# Patient Record
Sex: MALE | Race: WHITE | Employment: OTHER | URBAN - METROPOLITAN AREA
[De-identification: names, ages, dates, MRNs, and addresses within clinical notes are randomized per-mention and may not be internally consistent; named-entity substitution may affect disease eponyms.]

---

## 2022-06-25 ENCOUNTER — HOSPITAL ENCOUNTER (EMERGENCY)
Age: 77
Discharge: HOME | End: 2022-06-25
Attending: SPECIALIST
Payer: COMMERCIAL

## 2022-06-25 VITALS
RESPIRATION RATE: 18 BRPM | DIASTOLIC BLOOD PRESSURE: 66 MMHG | WEIGHT: 160 LBS | BODY MASS INDEX: 23.7 KG/M2 | OXYGEN SATURATION: 97 % | SYSTOLIC BLOOD PRESSURE: 151 MMHG | HEIGHT: 69 IN | HEART RATE: 61 BPM | TEMPERATURE: 98.1 F

## 2022-06-25 DIAGNOSIS — H92.01 RIGHT EAR PAIN: ICD-10-CM

## 2022-06-25 DIAGNOSIS — K08.89 DENTALGIA: Primary | ICD-10-CM

## 2022-06-25 PROCEDURE — 99283 EMERGENCY DEPT VISIT LOW MDM: CPT

## 2022-06-25 RX ORDER — IBUPROFEN 600 MG/1
600 TABLET ORAL EVERY 6 HOURS PRN
Qty: 20 TABLET | Refills: 0 | Status: ON HOLD | OUTPATIENT
Start: 2022-06-25 | End: 2022-07-30 | Stop reason: HOSPADM

## 2022-06-25 RX ORDER — OMEPRAZOLE 20 MG/1
40 CAPSULE, DELAYED RELEASE ORAL DAILY
COMMUNITY

## 2022-06-25 RX ORDER — CHLORHEXIDINE GLUCONATE 4 G/100ML
SOLUTION TOPICAL DAILY PRN
Status: ON HOLD | COMMUNITY
End: 2022-08-19 | Stop reason: HOSPADM

## 2022-06-25 RX ORDER — METOPROLOL SUCCINATE 25 MG/1
25 TABLET, EXTENDED RELEASE ORAL DAILY
Status: ON HOLD | COMMUNITY
End: 2022-07-22 | Stop reason: HOSPADM

## 2022-06-25 RX ORDER — FUROSEMIDE 40 MG/1
40 TABLET ORAL 2 TIMES DAILY
Status: ON HOLD | COMMUNITY
End: 2022-07-22 | Stop reason: HOSPADM

## 2022-06-25 RX ORDER — AMOXICILLIN 500 MG/1
500 CAPSULE ORAL 3 TIMES DAILY
Qty: 30 CAPSULE | Refills: 0 | Status: SHIPPED | OUTPATIENT
Start: 2022-06-25 | End: 2022-07-05

## 2022-06-25 RX ORDER — ALBUTEROL SULFATE 90 UG/1
2 AEROSOL, METERED RESPIRATORY (INHALATION) EVERY 6 HOURS PRN
COMMUNITY

## 2022-06-25 RX ORDER — ACETAMINOPHEN 325 MG/1
650 TABLET ORAL EVERY 6 HOURS PRN
COMMUNITY
End: 2022-09-22

## 2022-06-25 RX ORDER — NITROGLYCERIN 0.4 MG/1
0.4 TABLET SUBLINGUAL EVERY 5 MIN PRN
COMMUNITY

## 2022-06-25 ASSESSMENT — PAIN - FUNCTIONAL ASSESSMENT: PAIN_FUNCTIONAL_ASSESSMENT: 0-10

## 2022-06-25 ASSESSMENT — PAIN DESCRIPTION - LOCATION
LOCATION: FACE
LOCATION: FACE

## 2022-06-25 ASSESSMENT — PAIN DESCRIPTION - ORIENTATION
ORIENTATION: RIGHT
ORIENTATION: RIGHT

## 2022-06-25 ASSESSMENT — PAIN SCALES - GENERAL: PAINLEVEL_OUTOF10: 6

## 2022-06-25 NOTE — ED PROVIDER NOTES
Giuseppe Irizarry 1778 ENCOUNTER      Pt Name: David Phillip  MRN: [id number]  [place] 1945  Date of evaluation: 6/25/22      CHIEF COMPLAINT       Chief Complaint   Patient presents with    Facial Pain     started two days ago, right side          HISTORY OF PRESENT ILLNESS    David Phillip is a 68 y.o. male who presents to the emergency department brought in via EMS for evaluation of right facial pain off and on for last 2 days. The pain is localized mostly around the right ear and radiates towards the right side of the neck. Patient has been taking Tylenol only twice a day 650 mg each time with some relief. The last dose of Tylenol was yesterday morning. Patient has had right upper jaw root canal performed more than 2 weeks ago and was prescribed Keflex which she took for 4 to 5 days. He had leftover amoxicillin and took 1 dose 1 hour prior to arrival.  Pain is worse with chewing. He denies any fever or chills and denies noticing any swelling or discharge from the right upper jaw or gum area. He denies any headache, visual disturbances, neck pain, neck stiffness, chest pain, shortness of breath, palpitations or diaphoresis. He also denies any tingling, numbness or weakness in any of the extremities. REVIEW OF SYSTEMS       Review of Systems    All systems reviewed and negative unless noted in HPI. The patient denies fever or constitutional symptoms. Denies vision change. Denies any sore throat or rhinorrhea. Complains of right sided facial pain radiating from right ear towards the right side of the neck  Denies chest pain or shortness of breath. No nausea,  vomiting or diarrhea. Denies any dysuria. Denies urinary frequency or hematuria. Denies musculoskeletal injury or pain. Denies any weakness, numbness or focal neurologic deficit. Denies any skin rash or edema. No easy bruising or bleeding.    Denies any polyuria, polydypsia       PAST MEDICAL HISTORY    has a past medical history of Hyperlipidemia, Hypertension, MI (myocardial infarction) ([facility] [address] and MS (multiple sclerosis) ([facility] [address] SURGICAL HISTORY      has a past surgical history that includes pacemaker placement and Coronary angioplasty with stent. CURRENT MEDICATIONS       Discharge Medication List as of 6/25/2022  3:23 AM      CONTINUE these medications which have NOT CHANGED    Details   metoprolol succinate (TOPROL XL) 25 MG extended release tablet Take 25 mg by mouth daily 1/2 tabHistorical Med      furosemide (LASIX) 40 MG tablet Take 40 mg by mouth 2 times dailyHistorical Med      apixaban (ELIQUIS) 5 MG TABS tablet Take by mouth 2 times dailyHistorical Med      albuterol sulfate HFA (VENTOLIN HFA) 108 (90 Base) MCG/ACT inhaler Inhale 2 puffs into the lungs every 6 hours as needed for WheezingHistorical Med      Rosuvastatin Calcium 20 MG CPSP Take by mouth DailyHistorical Med      nitroGLYCERIN (NITROSTAT) 0.4 MG SL tablet Place 0.4 mg under the tongue every 5 minutes as needed for Chest pain up to max of 3 total doses. If no relief after 1 dose, call 911. Historical Med      chlorhexidine (HIBICLENS) 4 % external liquid Apply topically daily as needed Apply topically daily as needed., Topical, DAILY PRN, Historical Med      acetaminophen (TYLENOL) 325 MG tablet Take 650 mg by mouth every 6 hours as needed for PainHistorical Med      omeprazole (PRILOSEC) 20 MG delayed release capsule Take 20 mg by mouth DailyHistorical Med      LISINOPRIL PO Take 5 mg by mouth Daily Historical Med             ALLERGIES     is allergic to codeine, doxycycline, erythromycin, gammagard [immune globulin], and sulfa antibiotics. FAMILY HISTORY     has no family status information on file. family history is not on file. SOCIAL HISTORY      reports that he has quit smoking. He has never used smokeless tobacco. He reports that he does not drink alcohol and does not use drugs.     PHYSICAL EXAM INITIAL VITALS:  height is 5' 9\" (1.753 m) and weight is 72.6 kg (160 lb). His oral temperature is 98.1 °F (36.7 °C). His blood pressure is 151/66 (abnormal) and his pulse is 61. His respiration is 18 and oxygen saturation is 97%. Physical Exam  Vitals and nursing note reviewed. Constitutional:       Appearance: He is well-developed. HENT:      Head: Normocephalic and atraumatic. Jaw: No trismus. Right Ear: Tympanic membrane and ear canal normal.      Left Ear: Tympanic membrane and ear canal normal.      Nose: Nose normal.      Mouth/Throat:      Dentition: Dental tenderness present. No gingival swelling or dental abscesses. Eyes:      Extraocular Movements: Extraocular movements intact. Pupils: Pupils are equal, round, and reactive to light. Cardiovascular:      Rate and Rhythm: Normal rate and regular rhythm. Heart sounds: Normal heart sounds. No murmur heard. Pulmonary:      Effort: Pulmonary effort is normal. No respiratory distress. Breath sounds: Normal breath sounds. Abdominal:      General: Bowel sounds are normal. There is no distension. Palpations: Abdomen is soft. Tenderness: There is no abdominal tenderness. Musculoskeletal:      Cervical back: Normal range of motion and neck supple. Skin:     General: Skin is warm and dry. Neurological:      General: No focal deficit present. Mental Status: He is alert and oriented to person, place, and time. DIFFERENTIAL DIAGNOSIS/ MDM:     Right otalgia, tooth ache with no evidence of tooth abscess. DIAGNOSTIC RESULTS     EKG: All EKG's are interpreted by the Emergency Department Physician who either signs or Co-signs this chart in the absence of a cardiologist.    None obtained    RADIOLOGY:   Interpretation per the Radiologist below, if available at the time of this note:    No results found. LABS:  No results found for this visit on 06/25/22.       EMERGENCY DEPARTMENT COURSE: Vitals:    Vitals:    06/25/22 0251   BP: (!) 151/66   Pulse: 61   Resp: 18   Temp: 98.1 °F (36.7 °C)   TempSrc: Oral   SpO2: 97%   Weight: 72.6 kg (160 lb)   Height: 5' 9\" (1.753 m)     -------------------------  BP: (!) 151/66, Temp: 98.1 °F (36.7 °C), Heart Rate: 61, Resp: 18    Orders Placed This Encounter   Medications    amoxicillin (AMOXIL) 500 MG capsule     Sig: Take 1 capsule by mouth 3 times daily for 10 days     Dispense:  30 capsule     Refill:  0    ibuprofen (IBU) 600 MG tablet     Sig: Take 1 tablet by mouth every 6 hours as needed for Pain     Dispense:  20 tablet     Refill:  0    neomycin-polymyxin-hydrocortisone (CORTISPORIN) 3.5-34191-0 otic solution     Sig: Place 4 drops into the right ear 3 times daily for 10 days Instill into Right Ear     Dispense:  10 mL     Refill:  0         During the emergency department course, patient is resting comfortably and has remained pain-free. He is concerned about infection at the site of the root canal.  He was prescribed amoxicillin for 10 days course and advised to continue Tylenol as needed. He was also prescribed ibuprofen to take in addition to Tylenol if needed and prescribed Cortisporin otic solution for the right ear pain. He is advised to drink plenty of oral fluids, follow-up with his PCP and dentist, return if worse. The patient  understands that at this time there is no evidence for a more malignant underlying process, but also understands that early in the process of an illness or injury, an emergency department workup can be falsely reassuring. Routine discharge counseling was given, and it is understood that worsening, changing or persistent symptoms should prompt an immediate call or follow up with their primary physician or return to the emergency department. The importance of appropriate follow up was also discussed. I have reviewed the disposition diagnosis. I have answered the questions and given discharge instructions. There was voiced understanding of these instructions and no further questions or complaints. CONSULTS:  None    PROCEDURES:  None    FINAL IMPRESSION      1. Dentalgia    2. Right ear pain          DISPOSITION/PLAN       PATIENT REFERRED TO:  Lalo Chan MD  [address] [address] [facility] [address]  [address]  [phone]    Call in 3 days  For reevaluation of current symptoms    [facility] ED  [address] [facility] [address] [phone]    If symptoms worsen      DISCHARGE MEDICATIONS:  Discharge Medication List as of 6/25/2022  3:23 AM      START taking these medications    Details   amoxicillin (AMOXIL) 500 MG capsule Take 1 capsule by mouth 3 times daily for 10 days, Disp-30 capsule, R-0Normal      ibuprofen (IBU) 600 MG tablet Take 1 tablet by mouth every 6 hours as needed for Pain, Disp-20 tablet, R-0Normal      neomycin-polymyxin-hydrocortisone (CORTISPORIN) 3.5-11456-7 otic solution Place 4 drops into the right ear 3 times daily for 10 days Instill into Right Ear, Disp-10 mL, R-0Normal             (Please note that portions of this note were completed with a voice recognition program.  Efforts were made to edit the dictations but occasionally words are mis-transcribed.)    Sarah Bryant MD,, MD, F.A.C.E.P.   Attending Emergency Medicine Physician     Sarah Bryant MD  06/25/22 7397

## 2022-06-25 NOTE — ED NOTES
Pt to ER per EMS, transferred to bed, full assist. Pt reports right side face pain, that starts in ear and radiates to jaw area. Pt reports pain started two days ago, reports pain currently 6/10, denies analgesics PTA, but does report taking an extra amoxicillin dose. Pt also reports root canal about two weeks ago, unsure if related.  Pt calm, cooperative, respers even non labored, skin warm pink, no distress, here for eval.      Julio Huynh RN  06/25/22 0453

## 2022-06-25 NOTE — ED NOTES
Pt reports son is on his way to provide transport for pt to home.       Silvestre Sin RN  06/25/22 4933

## 2022-07-21 ENCOUNTER — APPOINTMENT (OUTPATIENT)
Dept: GENERAL RADIOLOGY | Age: 77
End: 2022-07-21
Payer: COMMERCIAL

## 2022-07-21 ENCOUNTER — APPOINTMENT (OUTPATIENT)
Dept: CT IMAGING | Age: 77
End: 2022-07-21
Payer: COMMERCIAL

## 2022-07-21 ENCOUNTER — HOSPITAL ENCOUNTER (OUTPATIENT)
Age: 77
Setting detail: OBSERVATION
Discharge: HOME | End: 2022-07-22
Attending: EMERGENCY MEDICINE | Admitting: HOSPITALIST
Payer: COMMERCIAL

## 2022-07-21 DIAGNOSIS — R07.9 CHEST PAIN, UNSPECIFIED TYPE: Primary | ICD-10-CM

## 2022-07-21 PROBLEM — N18.31 STAGE 3A CHRONIC KIDNEY DISEASE (HCC): Status: ACTIVE | Noted: 2022-07-21

## 2022-07-21 PROBLEM — I25.5 ISCHEMIC CARDIOMYOPATHY: Status: ACTIVE | Noted: 2022-07-21

## 2022-07-21 PROBLEM — I95.9 HYPOTENSION: Status: ACTIVE | Noted: 2022-07-21

## 2022-07-21 PROBLEM — I25.10 CORONARY ARTERY DISEASE INVOLVING NATIVE CORONARY ARTERY OF NATIVE HEART: Status: ACTIVE | Noted: 2022-07-21

## 2022-07-21 PROBLEM — I50.40 COMBINED CONGESTIVE SYSTOLIC AND DIASTOLIC HEART FAILURE (HCC): Status: ACTIVE | Noted: 2022-07-21

## 2022-07-21 PROBLEM — I48.20 CHRONIC ATRIAL FIBRILLATION (HCC): Status: ACTIVE | Noted: 2022-07-21

## 2022-07-21 LAB
ABSOLUTE EOS #: 0.1 K/UL (ref 0–0.4)
ABSOLUTE LYMPH #: 1.8 K/UL (ref 1–4.8)
ABSOLUTE MONO #: 1.4 K/UL (ref 0.1–1.2)
ALBUMIN SERPL-MCNC: 3.8 G/DL (ref 3.5–5.2)
ALBUMIN/GLOBULIN RATIO: 1.4 (ref 1–2.5)
ALP BLD-CCNC: 67 U/L (ref 40–129)
ALT SERPL-CCNC: 12 U/L (ref 5–41)
ANION GAP SERPL CALCULATED.3IONS-SCNC: 12 MMOL/L (ref 9–17)
AST SERPL-CCNC: 16 U/L
BASOPHILS # BLD: 0 % (ref 0–2)
BASOPHILS ABSOLUTE: 0 K/UL (ref 0–0.2)
BILIRUB SERPL-MCNC: 0.38 MG/DL (ref 0.3–1.2)
BUN BLDV-MCNC: 34 MG/DL (ref 8–23)
CALCIUM SERPL-MCNC: 8.8 MG/DL (ref 8.6–10.4)
CHLORIDE BLD-SCNC: 105 MMOL/L (ref 98–107)
CO2: 22 MMOL/L (ref 20–31)
CREAT SERPL-MCNC: 1.35 MG/DL (ref 0.7–1.2)
EOSINOPHILS RELATIVE PERCENT: 1 % (ref 1–4)
GFR AFRICAN AMERICAN: >60 ML/MIN
GFR NON-AFRICAN AMERICAN: 51 ML/MIN
GFR SERPL CREATININE-BSD FRML MDRD: ABNORMAL ML/MIN/{1.73_M2}
GLUCOSE BLD-MCNC: 131 MG/DL (ref 70–99)
HCT VFR BLD CALC: 38.1 % (ref 41–53)
HEMOGLOBIN: 12.3 G/DL (ref 13.5–17.5)
INR BLD: 1.2
LYMPHOCYTES # BLD: 16 % (ref 24–44)
MCH RBC QN AUTO: 28.3 PG (ref 26–34)
MCHC RBC AUTO-ENTMCNC: 32.4 G/DL (ref 31–37)
MCV RBC AUTO: 87.4 FL (ref 80–100)
MONOCYTES # BLD: 12 % (ref 2–11)
PARTIAL THROMBOPLASTIN TIME: 24.8 SEC (ref 21.3–31.3)
PDW BLD-RTO: 14.1 % (ref 12.5–15.4)
PLATELET # BLD: 243 K/UL (ref 140–450)
PMV BLD AUTO: 7.7 FL (ref 6–12)
POTASSIUM SERPL-SCNC: 4.3 MMOL/L (ref 3.7–5.3)
PRO-BNP: 1330 PG/ML
PROTHROMBIN TIME: 12.7 SEC (ref 9.4–12.6)
RBC # BLD: 4.36 M/UL (ref 4.5–5.9)
REASON FOR REJECTION: NORMAL
SEG NEUTROPHILS: 71 % (ref 36–66)
SEGMENTED NEUTROPHILS ABSOLUTE COUNT: 7.9 K/UL (ref 1.8–7.7)
SODIUM BLD-SCNC: 139 MMOL/L (ref 135–144)
TOTAL PROTEIN: 6.5 G/DL (ref 6.4–8.3)
TROPONIN, HIGH SENSITIVITY: 34 NG/L (ref 0–22)
TROPONIN, HIGH SENSITIVITY: 37 NG/L (ref 0–22)
WBC # BLD: 11.1 K/UL (ref 3.5–11)
ZZ NTE CLEAN UP: ORDERED TEST: NORMAL
ZZ NTE WITH NAME CLEAN UP: SPECIMEN SOURCE: NORMAL

## 2022-07-21 PROCEDURE — 80053 COMPREHEN METABOLIC PANEL: CPT

## 2022-07-21 PROCEDURE — 83880 ASSAY OF NATRIURETIC PEPTIDE: CPT

## 2022-07-21 PROCEDURE — 74175 CTA ABDOMEN W/CONTRAST: CPT

## 2022-07-21 PROCEDURE — 85610 PROTHROMBIN TIME: CPT

## 2022-07-21 PROCEDURE — G0378 HOSPITAL OBSERVATION PER HR: HCPCS

## 2022-07-21 PROCEDURE — 99285 EMERGENCY DEPT VISIT HI MDM: CPT

## 2022-07-21 PROCEDURE — 74174 CTA ABD&PLVS W/CONTRAST: CPT

## 2022-07-21 PROCEDURE — 99220 PR INITIAL OBSERVATION CARE/DAY 70 MINUTES: CPT | Performed by: HOSPITALIST

## 2022-07-21 PROCEDURE — 6360000002 HC RX W HCPCS: Performed by: EMERGENCY MEDICINE

## 2022-07-21 PROCEDURE — 2580000003 HC RX 258: Performed by: HOSPITALIST

## 2022-07-21 PROCEDURE — 96374 THER/PROPH/DIAG INJ IV PUSH: CPT

## 2022-07-21 PROCEDURE — 96361 HYDRATE IV INFUSION ADD-ON: CPT

## 2022-07-21 PROCEDURE — 96376 TX/PRO/DX INJ SAME DRUG ADON: CPT

## 2022-07-21 PROCEDURE — 71045 X-RAY EXAM CHEST 1 VIEW: CPT

## 2022-07-21 PROCEDURE — 84484 ASSAY OF TROPONIN QUANT: CPT

## 2022-07-21 PROCEDURE — 85025 COMPLETE CBC W/AUTO DIFF WBC: CPT

## 2022-07-21 PROCEDURE — 96375 TX/PRO/DX INJ NEW DRUG ADDON: CPT

## 2022-07-21 PROCEDURE — 93005 ELECTROCARDIOGRAM TRACING: CPT | Performed by: HOSPITALIST

## 2022-07-21 PROCEDURE — 85730 THROMBOPLASTIN TIME PARTIAL: CPT

## 2022-07-21 PROCEDURE — 36415 COLL VENOUS BLD VENIPUNCTURE: CPT

## 2022-07-21 PROCEDURE — 6370000000 HC RX 637 (ALT 250 FOR IP): Performed by: HOSPITALIST

## 2022-07-21 PROCEDURE — 2580000003 HC RX 258: Performed by: EMERGENCY MEDICINE

## 2022-07-21 PROCEDURE — 6360000004 HC RX CONTRAST MEDICATION: Performed by: EMERGENCY MEDICINE

## 2022-07-21 RX ORDER — ACETAMINOPHEN 325 MG/1
650 TABLET ORAL EVERY 6 HOURS PRN
Status: DISCONTINUED | OUTPATIENT
Start: 2022-07-21 | End: 2022-07-21 | Stop reason: SDUPTHER

## 2022-07-21 RX ORDER — SODIUM CHLORIDE 0.9 % (FLUSH) 0.9 %
5-40 SYRINGE (ML) INJECTION EVERY 12 HOURS SCHEDULED
Status: DISCONTINUED | OUTPATIENT
Start: 2022-07-21 | End: 2022-07-22 | Stop reason: HOSPADM

## 2022-07-21 RX ORDER — POLYETHYLENE GLYCOL 3350 17 G/17G
17 POWDER, FOR SOLUTION ORAL DAILY PRN
Status: DISCONTINUED | OUTPATIENT
Start: 2022-07-21 | End: 2022-07-22 | Stop reason: HOSPADM

## 2022-07-21 RX ORDER — ONDANSETRON 2 MG/ML
4 INJECTION INTRAMUSCULAR; INTRAVENOUS ONCE
Status: COMPLETED | OUTPATIENT
Start: 2022-07-21 | End: 2022-07-21

## 2022-07-21 RX ORDER — MORPHINE SULFATE 2 MG/ML
2 INJECTION, SOLUTION INTRAMUSCULAR; INTRAVENOUS ONCE
Status: COMPLETED | OUTPATIENT
Start: 2022-07-21 | End: 2022-07-21

## 2022-07-21 RX ORDER — 0.9 % SODIUM CHLORIDE 0.9 %
1000 INTRAVENOUS SOLUTION INTRAVENOUS ONCE
Status: COMPLETED | OUTPATIENT
Start: 2022-07-21 | End: 2022-07-21

## 2022-07-21 RX ORDER — NITROGLYCERIN 0.4 MG/1
0.4 TABLET SUBLINGUAL EVERY 5 MIN PRN
Status: DISCONTINUED | OUTPATIENT
Start: 2022-07-21 | End: 2022-07-22 | Stop reason: HOSPADM

## 2022-07-21 RX ORDER — ACETAMINOPHEN 650 MG/1
650 SUPPOSITORY RECTAL EVERY 6 HOURS PRN
Status: DISCONTINUED | OUTPATIENT
Start: 2022-07-21 | End: 2022-07-22 | Stop reason: HOSPADM

## 2022-07-21 RX ORDER — SODIUM CHLORIDE 9 MG/ML
INJECTION, SOLUTION INTRAVENOUS PRN
Status: DISCONTINUED | OUTPATIENT
Start: 2022-07-21 | End: 2022-07-22 | Stop reason: HOSPADM

## 2022-07-21 RX ORDER — ACETAMINOPHEN 325 MG/1
650 TABLET ORAL EVERY 6 HOURS PRN
Status: DISCONTINUED | OUTPATIENT
Start: 2022-07-21 | End: 2022-07-22 | Stop reason: HOSPADM

## 2022-07-21 RX ORDER — 0.9 % SODIUM CHLORIDE 0.9 %
80 INTRAVENOUS SOLUTION INTRAVENOUS ONCE
Status: DISCONTINUED | OUTPATIENT
Start: 2022-07-21 | End: 2022-07-22

## 2022-07-21 RX ORDER — POTASSIUM CHLORIDE 20 MEQ/1
40 TABLET, EXTENDED RELEASE ORAL PRN
Status: DISCONTINUED | OUTPATIENT
Start: 2022-07-21 | End: 2022-07-22 | Stop reason: HOSPADM

## 2022-07-21 RX ORDER — ALBUTEROL SULFATE 90 UG/1
2 AEROSOL, METERED RESPIRATORY (INHALATION) EVERY 6 HOURS PRN
Status: DISCONTINUED | OUTPATIENT
Start: 2022-07-21 | End: 2022-07-22 | Stop reason: HOSPADM

## 2022-07-21 RX ORDER — SODIUM CHLORIDE 0.9 % (FLUSH) 0.9 %
10 SYRINGE (ML) INJECTION PRN
Status: DISCONTINUED | OUTPATIENT
Start: 2022-07-21 | End: 2022-07-22 | Stop reason: HOSPADM

## 2022-07-21 RX ORDER — PANTOPRAZOLE SODIUM 40 MG/1
40 TABLET, DELAYED RELEASE ORAL
Status: DISCONTINUED | OUTPATIENT
Start: 2022-07-22 | End: 2022-07-22 | Stop reason: HOSPADM

## 2022-07-21 RX ORDER — MAGNESIUM SULFATE 1 G/100ML
1000 INJECTION INTRAVENOUS PRN
Status: DISCONTINUED | OUTPATIENT
Start: 2022-07-21 | End: 2022-07-22 | Stop reason: HOSPADM

## 2022-07-21 RX ORDER — ROSUVASTATIN CALCIUM 20 MG/1
20 TABLET, COATED ORAL DAILY
Status: DISCONTINUED | OUTPATIENT
Start: 2022-07-21 | End: 2022-07-22 | Stop reason: HOSPADM

## 2022-07-21 RX ORDER — POTASSIUM CHLORIDE 7.45 MG/ML
10 INJECTION INTRAVENOUS PRN
Status: DISCONTINUED | OUTPATIENT
Start: 2022-07-21 | End: 2022-07-22 | Stop reason: HOSPADM

## 2022-07-21 RX ORDER — ONDANSETRON 4 MG/1
4 TABLET, ORALLY DISINTEGRATING ORAL EVERY 8 HOURS PRN
Status: DISCONTINUED | OUTPATIENT
Start: 2022-07-21 | End: 2022-07-22 | Stop reason: HOSPADM

## 2022-07-21 RX ORDER — SODIUM CHLORIDE 9 MG/ML
INJECTION, SOLUTION INTRAVENOUS CONTINUOUS
Status: DISCONTINUED | OUTPATIENT
Start: 2022-07-21 | End: 2022-07-22

## 2022-07-21 RX ORDER — ONDANSETRON 2 MG/ML
4 INJECTION INTRAMUSCULAR; INTRAVENOUS EVERY 6 HOURS PRN
Status: DISCONTINUED | OUTPATIENT
Start: 2022-07-21 | End: 2022-07-22 | Stop reason: HOSPADM

## 2022-07-21 RX ADMIN — MORPHINE SULFATE 2 MG: 2 INJECTION, SOLUTION INTRAMUSCULAR; INTRAVENOUS at 13:09

## 2022-07-21 RX ADMIN — ONDANSETRON 4 MG: 2 INJECTION INTRAMUSCULAR; INTRAVENOUS at 13:09

## 2022-07-21 RX ADMIN — SODIUM CHLORIDE 1000 ML: 9 INJECTION, SOLUTION INTRAVENOUS at 12:29

## 2022-07-21 RX ADMIN — SODIUM CHLORIDE, PRESERVATIVE FREE 10 ML: 5 INJECTION INTRAVENOUS at 21:18

## 2022-07-21 RX ADMIN — IOPAMIDOL 100 ML: 755 INJECTION, SOLUTION INTRAVENOUS at 13:48

## 2022-07-21 RX ADMIN — SODIUM CHLORIDE 1000 ML: 9 INJECTION, SOLUTION INTRAVENOUS at 13:17

## 2022-07-21 RX ADMIN — SODIUM CHLORIDE: 9 INJECTION, SOLUTION INTRAVENOUS at 18:50

## 2022-07-21 RX ADMIN — ACETAMINOPHEN 650 MG: 325 TABLET ORAL at 23:29

## 2022-07-21 RX ADMIN — APIXABAN 5 MG: 5 TABLET, FILM COATED ORAL at 21:12

## 2022-07-21 RX ADMIN — MORPHINE SULFATE 2 MG: 2 INJECTION, SOLUTION INTRAMUSCULAR; INTRAVENOUS at 12:50

## 2022-07-21 RX ADMIN — ONDANSETRON 4 MG: 2 INJECTION INTRAMUSCULAR; INTRAVENOUS at 12:50

## 2022-07-21 RX ADMIN — Medication 80 ML: at 13:49

## 2022-07-21 RX ADMIN — ROSUVASTATIN 20 MG: 20 TABLET, FILM COATED ORAL at 19:39

## 2022-07-21 RX ADMIN — SODIUM CHLORIDE, PRESERVATIVE FREE 10 ML: 5 INJECTION INTRAVENOUS at 13:49

## 2022-07-21 ASSESSMENT — PAIN SCALES - GENERAL
PAINLEVEL_OUTOF10: 4
PAINLEVEL_OUTOF10: 10
PAINLEVEL_OUTOF10: 10
PAINLEVEL_OUTOF10: 3
PAINLEVEL_OUTOF10: 10

## 2022-07-21 ASSESSMENT — PAIN - FUNCTIONAL ASSESSMENT: PAIN_FUNCTIONAL_ASSESSMENT: 0-10

## 2022-07-21 ASSESSMENT — PAIN DESCRIPTION - LOCATION
LOCATION: CHEST
LOCATION: CHEST

## 2022-07-21 ASSESSMENT — PAIN DESCRIPTION - PAIN TYPE: TYPE: ACUTE PAIN

## 2022-07-21 ASSESSMENT — PAIN DESCRIPTION - DESCRIPTORS: DESCRIPTORS: SORE

## 2022-07-21 ASSESSMENT — HEART SCORE: ECG: 1

## 2022-07-21 NOTE — ED NOTES
EMS call stating they were in route to [place] with a 68year old male having chest pain. One spray of Nitro and four baby aspirin were given. EMS stated after the nitro spray, patient went unresponsive and they had to begin bagging patient. EMS stated they need help outside to transport patient into the hospital. Upon arrival to hospital, RN went out to help, patient started to become more responsive. Patient was hypotensive and diaphoretic upon arrival into ED room.      Abdi Smith RN  07/21/22 0348

## 2022-07-21 NOTE — PLAN OF CARE
Problem: Discharge Planning  Goal: Discharge to home or other facility with appropriate resources  Outcome: Progressing  Flowsheets (Taken 7/21/2022 1831)  Discharge to home or other facility with appropriate resources:   Identify barriers to discharge with patient and caregiver   Arrange for needed discharge resources and transportation as appropriate   Identify discharge learning needs (meds, wound care, etc)     Problem: Pain  Goal: Verbalizes/displays adequate comfort level or baseline comfort level  Outcome: Progressing  Flowsheets (Taken 7/21/2022 1833)  Verbalizes/displays adequate comfort level or baseline comfort level:   Encourage patient to monitor pain and request assistance   Assess pain using appropriate pain scale   Administer analgesics based on type and severity of pain and evaluate response   Implement non-pharmacological measures as appropriate and evaluate response   Consider cultural and social influences on pain and pain management     Problem: ABCDS Injury Assessment  Goal: Absence of physical injury  Outcome: Progressing     Problem: Safety - Adult  Goal: Free from fall injury  Outcome: Progressing

## 2022-07-21 NOTE — ED NOTES
Dr. Memo Andrews gave verbal order for blood pressure in both arms. Right arm: 74/45  Left arm: 81/41  RN gave readings to Dr. Memo Andrews, no orders received.      Thalia Bills RN  07/21/22 7433

## 2022-07-21 NOTE — ED PROVIDER NOTES
[facility] ED  EMERGENCY DEPARTMENT ENCOUNTER      Pt Name: Rosa Deleon  MRN: [id number]  [place] 1945  Date of evaluation: 7/21/2022  Provider: Chuck Najera MD    CHIEF COMPLAINT     Chief Complaint   Patient presents with    Chest Pain         HISTORY OF PRESENT ILLNESS   (Location/Symptom, Timing/Onset, Context/Setting,Quality, Duration, Modifying Factors, Severity)  Note limiting factors. Rosa Deleon is a 68 y.o. male who presents to the emergency department by EMS with a history of chest pain that he has been experiencing since last night. Patient does have a significant heart history of previous myocardial infarction with PCI and stent placement done at the [facility] in [place]. When paramedics showed up he was in his garage. They started an IV and give him chewable aspirin as well as 1 sublingual nitroglycerin tablet. His blood-pressure started dropping and he became briefly unresponsive. He did not require CPR and did not require to be bagged and on the way here he started waking up and becoming more responsive. Patient continues to complain of chest pain. He is profoundly hypotensive upon arrival but is responding to IV fluids. The history is provided by the patient, medical records and the EMS personnel. Nursing Notes werereviewed. REVIEW OF SYSTEMS    (2-9 systems for level 4, 10 or more for level 5)     Review of Systems   Unable to perform ROS: Acuity of condition     Except as noted above the remainder of the review of systems was reviewed and negative.        PAST MEDICAL HISTORY     Past Medical History:   Diagnosis Date    Hyperlipidemia     Hypertension     MI (myocardial infarction) ([facility] [address]     MS (multiple sclerosis) ([facility] [address]          SURGICALHISTORY       Past Surgical History:   Procedure Laterality Date    CORONARY ANGIOPLASTY WITH STENT PLACEMENT      PACEMAKER PLACEMENT           CURRENT MEDICATIONS       Previous Medications    ACETAMINOPHEN (TYLENOL) 325 MG TABLET    Take 650 mg by mouth every 6 hours as needed for Pain    ALBUTEROL SULFATE HFA (VENTOLIN HFA) 108 (90 BASE) MCG/ACT INHALER    Inhale 2 puffs into the lungs every 6 hours as needed for Wheezing    APIXABAN (ELIQUIS) 5 MG TABS TABLET    Take by mouth 2 times daily    CHLORHEXIDINE (HIBICLENS) 4 % EXTERNAL LIQUID    Apply topically daily as needed Apply topically daily as needed. FUROSEMIDE (LASIX) 40 MG TABLET    Take 40 mg by mouth 2 times daily    IBUPROFEN (IBU) 600 MG TABLET    Take 1 tablet by mouth every 6 hours as needed for Pain    LISINOPRIL PO    Take 5 mg by mouth Daily     METOPROLOL SUCCINATE (TOPROL XL) 25 MG EXTENDED RELEASE TABLET    Take 25 mg by mouth daily 1/2 tab    NITROGLYCERIN (NITROSTAT) 0.4 MG SL TABLET    Place 0.4 mg under the tongue every 5 minutes as needed for Chest pain up to max of 3 total doses. If no relief after 1 dose, call 911. OMEPRAZOLE (PRILOSEC) 20 MG DELAYED RELEASE CAPSULE    Take 20 mg by mouth Daily    ROSUVASTATIN CALCIUM 20 MG CPSP    Take by mouth Daily       ALLERGIES     Codeine, Doxycycline, Erythromycin, Gammagard [immune globulin], and Sulfa antibiotics    FAMILY HISTORY     History reviewed. No pertinent family history. SOCIAL HISTORY       Social History     Socioeconomic History    Marital status:      Spouse name: None    Number of children: None    Years of education: None    Highest education level: None   Tobacco Use    Smoking status: Former    Smokeless tobacco: Never   Substance and Sexual Activity    Alcohol use: No    Drug use: No       SCREENINGS    [place] Coma Scale  Eye Opening: To speech  Best Verbal Response: Oriented  Best Motor Response: Obeys commands  [place] Coma Scale Score: 14 Heart Score for chest pain patients  History:  Moderately Suspicious  ECG: Non-Specifc repolarization disturbance/LBTB/PM  Patient Age: > 65 years  *Risk factors for Atherosclerotic disease: Obesity, Coronary Artery Disease  Risk Factors: > 3 Risk factors or history of atherosclerotic disease*  Troponin: > 1 and < 3X normal limit  Heart Score Total: 7      PHYSICAL EXAM    (up to 7 for level 4, 8 or more for level 5)     ED Triage Vitals   BP Temp Temp src Heart Rate Resp SpO2 Height Weight   07/21/22 1226 -- -- 07/21/22 1230 07/21/22 1230 07/21/22 1230 07/21/22 1230 07/21/22 1230   (!) 72/50   67 18 95 % 5' 9\" (1.753 m) 160 lb (72.6 kg)       Physical Exam  Vitals reviewed. Constitutional:       General: He is in acute distress. Appearance: He is ill-appearing and diaphoretic. HENT:      Head: Normocephalic. Right Ear: External ear normal.      Left Ear: External ear normal.      Nose: Nose normal.      Mouth/Throat:      Mouth: Mucous membranes are moist.   Eyes:      Extraocular Movements: Extraocular movements intact. Pupils: Pupils are equal, round, and reactive to light. Cardiovascular:      Rate and Rhythm: Normal rate and regular rhythm. Heart sounds: Normal heart sounds. Pulmonary:      Effort: Pulmonary effort is normal. No respiratory distress. Breath sounds: Normal breath sounds. Abdominal:      Palpations: Abdomen is soft. Tenderness: There is no abdominal tenderness. Musculoskeletal:         General: No tenderness. Cervical back: Neck supple. Right lower leg: Edema present. Left lower leg: Edema present. Skin:     General: Skin is warm. Coloration: Skin is pale. Skin is not jaundiced. Findings: No rash. Neurological:      Comments: Poorly responsible but responds to voice and tactile stimuli. Opens eyes on command. Speaks in short sentences. Respirations are spontaneous. DIAGNOSTIC RESULTS     EKG: All EKG's are interpreted by the Emergency Department Physician who either signs orCo-signs this chart in the absence of a cardiologist.    Atrial sensed ventricular paced rhythm with rate of 60 beats a minute.     An old EKG from January 2016 shows sinus rhythm, old inferior wall infarct and right bundle branch block pattern. RADIOLOGY:     Interpretation per the Radiologist below, ifavailable at the time of this note:    CTA CHEST ABDOMEN W CONTRAST   Final Result   No evidence of aortic dissection or thoracic aortic aneurysm. Infrarenal   abdominal aortic aneurysm, measuring 3-cm. No acute aortic abnormality. No   central pulmonary embolism. Chronically occluded right external iliac artery. The visualized bilateral   superficial arteries are occluded as well, likely on a chronic basis. Minimal bibasilar atelectasis. Otherwise, clear lungs. Cardiomegaly. 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 The upper left renal pole is viable, perfused separately VA smaller   accessory branch. Sigmoid diverticulosis. Enlarged prostate gland. No acute findings within   the abdomen or pelvis. RECOMMENDATIONS:   3 cm infrarenal abdominal aortic aneurysm. Recommend follow-up every 3 years. Reference: J Am John Radiol 7568;91:148-975. XR CHEST PORTABLE   Final Result   1. Patchy left lower lobe airspace opacities. This could represent   pneumonia in the appropriate clinical setting.       2.  Mild cardiomegaly with left-sided ICD               ED BEDSIDE ULTRASOUND:   Performed by ED Physician - none    LABS:  Labs Reviewed   CBC WITH AUTO DIFFERENTIAL - Abnormal; Notable for the following components:       Result Value    WBC 11.1 (*)     RBC 4.36 (*)     Hemoglobin 12.3 (*)     Hematocrit 38.1 (*)     Seg Neutrophils 71 (*)     Lymphocytes 16 (*)     Monocytes 12 (*)     Segs Absolute 7.90 (*)     Absolute Mono # 1.40 (*)     All other components within normal limits   COMPREHENSIVE METABOLIC PANEL - Abnormal; Notable for the following components:    Glucose 131 (*)     BUN 34 (*)     Creatinine 1.35 (*)     GFR Non- 51 (*)     All other components within normal limits   TROPONIN - Abnormal; Notable for the following components:    Troponin, High Sensitivity 37 (*)     All other components within normal limits   BRAIN NATRIURETIC PEPTIDE - Abnormal; Notable for the following components:    Pro-BNP 1,330 (*)     All other components within normal limits   PROTIME-INR - Abnormal; Notable for the following components:    Protime 12.7 (*)     All other components within normal limits   TROPONIN - Abnormal; Notable for the following components:    Troponin, High Sensitivity 34 (*)     All other components within normal limits   SPECIMEN REJECTION   APTT   PREVIOUS SPECIMEN       All other labs were within normal range ornot returned as of this dictation. EMERGENCY DEPARTMENT COURSE and DIFFERENTIAL DIAGNOSIS/MDM:   Vitals:    Vitals:    07/21/22 1502 07/21/22 1517 07/21/22 1532 07/21/22 1547   BP: (!) 102/53 104/60 (!) 98/51 (!) 103/53   Pulse: 75 66 71 67   Resp: 15 17 23 21   SpO2: 94% 96% 94% 94%   Weight:       Height:           ED Course as of 07/21/22 1554   Thu Jul 21, 2022   1552 Troponin levels are 37 with a second 1 being 34. CT angiogram of the chest did not show infiltrate and there was no evidence of aortic dissection. Patient has had some relief with IV morphine. His blood-pressure has also started to come up with a administration of 2 L of saline. I feel his hypotension was related to nitroglycerin use. Troponin levels are elevated but not trending upward and the elevated levels could also be secondary to his chronic kidney disease. Findings are discussed with Dr. Alexandra Doyle who is admitting him for further management. [SH]      ED Course User Index  [SH] Yong Hilton MD     [facility]      CRITICAL CARE TIME   Total Critical Caretime was 45 minutes, excluding separately reportable procedures. There was a high probability of clinically significant/life threatening deterioration in the patient's condition which required my urgent intervention. CONSULTS:  None    PROCEDURES:  Unlessotherwise noted below, none     Procedures    FINAL IMPRESSION      1. Chest pain, unspecified type          DISPOSITION/PLAN   DISPOSITION Admitted 07/21/2022 03:53:53 PM      PATIENT REFERRED TO:  No follow-up provider specified. DISCHARGE MEDICATIONS:         Problem List:  Patient Active Problem List   Diagnosis Code    Hypotension I95.9           Summation      Patient Course: Admitted    ED Medicationsadministered this visit:    Medications   0.9 % sodium chloride bolus (80 mLs IntraVENous Bolus from Bag 7/21/22 1349)   sodium chloride flush 0.9 % injection 10 mL (10 mLs IntraVENous Given 7/21/22 1349)   0.9 % sodium chloride bolus (0 mLs IntraVENous Stopped 7/21/22 1318)   ondansetron (ZOFRAN) injection 4 mg (4 mg IntraVENous Given 7/21/22 1250)   morphine (PF) injection 2 mg (2 mg IntraVENous Given 7/21/22 1250)   morphine (PF) injection 2 mg (2 mg IntraVENous Given 7/21/22 1309)   ondansetron (ZOFRAN) injection 4 mg (4 mg IntraVENous Given 7/21/22 1309)   0.9 % sodium chloride bolus (0 mLs IntraVENous Stopped 7/21/22 1403)   iopamidol (ISOVUE-370) 76 % injection 100 mL (100 mLs IntraVENous Given 7/21/22 1348)       New Prescriptions from this visit:    New Prescriptions    No medications on file       Follow-up:  No follow-up provider specified. Final Impression:   1.  Chest pain, unspecified type               (Please note that portions of this note were completed with a voice recognitionprogram.  Efforts were made to edit the dictations but occasionally words are mis-transcribed.)    Maris Merrill MD (electronically signed)  Attending Emergency Physician            Maris Merrill MD  07/21/22 4422

## 2022-07-21 NOTE — H&P
[facility]  Office: [address], DO, Lornader Din, DO, Dhiraj Acron, DO, Ant Hudson Blood, DO, Mikayla Vo MD, Rafael Harrington MD, Mylene Rivers MD, Shekhar Jiang MD,  David Rivas MD, Hortensia Davila MD, Gavino Blanco, DO, Cammy Le MD,  Alla Cuba MD, Hill Perales MD, Stacey Epps, DO, Nenita Buckner MD, Yun Madrid MD, Guerline Santana MD, Thierry Hassan, DO, Nithin Hurley MD, Skyler Proctor MD, Jesusita Ruffin, CNP,  Adelia Alpers, CNP, Viky Boyce, CNP, Megan Posadas, CNP, Master Grant PA-C, Vandana Gee, DNP, Marshal Diaz, CNP, Ave Hall, CNP, Maria D Handy, CNP, Miranda Herring, CNP, Nicole Thomas, CNS, Red Younger, [facility], Omar Howard, CNP, Lexi Deleon, CNP, Price Franz, CNP, Farideh Garcia, CNP           [facility]    HISTORY AND PHYSICAL EXAMINATION            Date:   7/21/2022  Patient name:  Samantha Melo  Date of admission:  7/21/2022 12:23 PM  MRN:   [id number]  Account:  [de-identified]  YOB: 1945  PCP:    No primary care provider on file. Room:   2TRAUMA/2TRAUMA  Code Status:    No Order    Chief Complaint:     Chief Complaint   Patient presents with    Chest Pain     Patient presents to the hospital with chest pain and hypotension, patient states \"I feel better after fluids\"    History Obtained From:     patient, electronic medical record    History of Present Illness:     Samantha Melo is a 68 y.o. Non- / non  male who presents with Chest Pain   and is admitted to the hospital for the management of Hypotension. This very pleasant 80-year-old male with a relatively extensive cardiac history called EMS with chest pain. Started developing chest pain yesterday evening. He was given a dose of nitroglycerin by EMS and subsequently developed acute hypotension and unresponsiveness.   The patient did not require CPR and did ultimately become responsive on his own. The patient was quite hypotensive on arrival to the emergency room and he is responded well to IV fluids. He does have a known history of systolic dysfunction. In the past he is followed with [facility] cardiology however receives all of his care through the [address] at this point in time. He also has a known history of atrial fibrillation. In the past he has been on amiodarone but is presently maintained with Toprol and Eliquis. He does have an AICD in place and is electronically paced. At the time of my examination he is still having some chest pain that is worse with inspiration. He has been taking his Eliquis diligently and the risk for PE is quite low. He may have an element of pericarditis and I am going to check a echocardiogram.  Troponins are relatively unremarkable. Regarding his hypotension further history taking reveals that he has not been drinking fluids as he normally does. He does state that in hindsight he has been drinking less fluids than he typically does. He has been taking his Lasix diligently and I suspect between decreased oral intake and continued diuretic use this is what led to his hypotensive state. I do have a suspicion that the patient's chest pain is secondary to his hypotension. We will continue with gentle fluids with has known systolic dysfunction and reassess accordingly. Providing his echocardiogram does not show any significant abnormalities beyond his known systolic dysfunction anticipate he may be able to be discharged home tomorrow if he feels better. Lasix dosing may need to be titrated if his decreased oral intake continues to be a trend. The patient voices understanding and denies any questions or concerns at this point in time.     Past Medical History:     Past Medical History:   Diagnosis Date    Hyperlipidemia     Hypertension     MI (myocardial infarction) ([facility] [address]     MS (multiple sclerosis) ([facility] [address] Past Surgical History:     Past Surgical History:   Procedure Laterality Date    CORONARY ANGIOPLASTY WITH STENT PLACEMENT      PACEMAKER PLACEMENT          Medications Prior to Admission:     Prior to Admission medications    Medication Sig Start Date End Date Taking? Authorizing Provider   metoprolol succinate (TOPROL XL) 25 MG extended release tablet Take 25 mg by mouth daily 1/2 tab    Historical Provider, MD   furosemide (LASIX) 40 MG tablet Take 40 mg by mouth 2 times daily    Historical Provider, MD   apixaban (ELIQUIS) 5 MG TABS tablet Take by mouth 2 times daily    Historical Provider, MD   albuterol sulfate HFA (VENTOLIN HFA) 108 (90 Base) MCG/ACT inhaler Inhale 2 puffs into the lungs every 6 hours as needed for Wheezing    Historical Provider, MD   Rosuvastatin Calcium 20 MG CPSP Take by mouth Daily    Historical Provider, MD   nitroGLYCERIN (NITROSTAT) 0.4 MG SL tablet Place 0.4 mg under the tongue every 5 minutes as needed for Chest pain up to max of 3 total doses. If no relief after 1 dose, call 911. Historical Provider, MD   chlorhexidine (HIBICLENS) 4 % external liquid Apply topically daily as needed Apply topically daily as needed. Historical Provider, MD   acetaminophen (TYLENOL) 325 MG tablet Take 650 mg by mouth every 6 hours as needed for Pain    Historical Provider, MD   omeprazole (PRILOSEC) 20 MG delayed release capsule Take 20 mg by mouth Daily    Historical Provider, MD   ibuprofen (IBU) 600 MG tablet Take 1 tablet by mouth every 6 hours as needed for Pain 6/25/22 7/2/22  Shannan Ritchie MD   LISINOPRIL PO Take 5 mg by mouth Daily     Historical Provider, MD        Allergies:     Codeine, Doxycycline, Erythromycin, Gammagard [immune globulin], and Sulfa antibiotics    Social History:     Tobacco:    reports that he has quit smoking. He has never used smokeless tobacco.  Alcohol:      reports no history of alcohol use. Drug Use:  reports no history of drug use.     Family History: History reviewed. No pertinent family history. Review of Systems:     Positive and Negative as described in HPI. CONSTITUTIONAL: Positive for generalized weakness  HEENT:  negative for vision, hearing changes, runny nose, throat pain  RESPIRATORY:  negative for shortness of breath, cough, congestion, wheezing  CARDIOVASCULAR:   Positive for chest pain worse with deep inspiration  GASTROINfor chest painTESTINAL:  negative for nausea, vomiting, diarrhea, constipation, change in bowel habits, abdominal pain   GENITOURINARY:  negative for difficulty of urination, burning with urination, frequency   INTEGUMENT:  negative for rash, skin lesions, easy bruising   HEMATOLOGIC/LYMPHATIC:  negative for swelling/edema   ALLERGIC/IMMUNOLOGIC:  negative for urticaria , itching  ENDOCRINE:  negative increase in drinking, increase in urination, hot or cold intolerance  MUSCULOSKELETAL:  negative joint pains, muscle aches, swelling of joints  NEUROLOGICAL:  negative for headaches, dizziness, lightheadedness, numbness, pain, tingling extremities  BEHAVIOR/PSYCH:  negative for depression, anxiety    Physical Exam:   BP (!) 103/53   Pulse 67   Resp 21   Ht 5' 9\" (1.753 m)   Wt 160 lb (72.6 kg)   SpO2 94%   BMI 23.63 kg/m²   No data recorded. No results for input(s): POCGLU in the last 72 hours.   No intake or output data in the 24 hours ending 07/21/22 1607    General Appearance:  alert, well appearing, and in no acute distress  Mental status: oriented to person, place, and time  Head:  normocephalic, atraumatic  Eye: no icterus, redness, pupils equal and reactive, extraocular eye movements intact, conjunctiva clear  Ear: normal external ear, no discharge, hearing intact  Nose:  no drainage noted  Mouth: mucous membranes moist  Neck: supple, no carotid bruits, thyroid not palpable  Lungs: Bilateral equal air entry, clear to ausculation, no wheezing, rales or rhonchi, normal effort  Cardiovascular: normal rate, regular rhythm, no murmur, gallop, rub  Abdomen: Soft, nontender, nondistended, normal bowel sounds, no hepatomegaly or splenomegaly  Neurologic: There are no new focal motor or sensory deficits, normal muscle tone and bulk, no abnormal sensation, normal speech, cranial nerves II through XII grossly intact  Skin: No gross lesions, rashes, bruising or bleeding on exposed skin area  Extremities:  peripheral pulses palpable, no pedal edema or calf pain with palpation  Psych: normal affect     Investigations:      Laboratory Testing:  Recent Results (from the past 24 hour(s))   CBC with Auto Differential    Collection Time: 07/21/22 12:30 PM   Result Value Ref Range    WBC 11.1 (H) 3.5 - 11.0 k/uL    RBC 4.36 (L) 4.5 - 5.9 m/uL    Hemoglobin 12.3 (L) 13.5 - 17.5 g/dL    Hematocrit 38.1 (L) 41 - 53 %    MCV 87.4 80 - 100 fL    MCH 28.3 26 - 34 pg    MCHC 32.4 31 - 37 g/dL    RDW 14.1 12.5 - 15.4 %    Platelets 644 764 - 129 k/uL    MPV 7.7 6.0 - 12.0 fL    Seg Neutrophils 71 (H) 36 - 66 %    Lymphocytes 16 (L) 24 - 44 %    Monocytes 12 (H) 2 - 11 %    Eosinophils % 1 1 - 4 %    Basophils 0 0 - 2 %    Segs Absolute 7.90 (H) 1.8 - 7.7 k/uL    Absolute Lymph # 1.80 1.0 - 4.8 k/uL    Absolute Mono # 1.40 (H) 0.1 - 1.2 k/uL    Absolute Eos # 0.10 0.0 - 0.4 k/uL    Basophils Absolute 0.00 0.0 - 0.2 k/uL   CMP    Collection Time: 07/21/22 12:30 PM   Result Value Ref Range    Glucose 131 (H) 70 - 99 mg/dL    BUN 34 (H) 8 - 23 mg/dL    Creatinine 1.35 (H) 0.70 - 1.20 mg/dL    Calcium 8.8 8.6 - 10.4 mg/dL    Sodium 139 135 - 144 mmol/L    Potassium 4.3 3.7 - 5.3 mmol/L    Chloride 105 98 - 107 mmol/L    CO2 22 20 - 31 mmol/L    Anion Gap 12 9 - 17 mmol/L    Alkaline Phosphatase 67 40 - 129 U/L    ALT 12 5 - 41 U/L    AST 16 <40 U/L    Total Bilirubin 0.38 0.3 - 1.2 mg/dL    Total Protein 6.5 6.4 - 8.3 g/dL    Albumin 3.8 3.5 - 5.2 g/dL    Albumin/Globulin Ratio 1.4 1.0 - 2.5    GFR Non- 51 (L) >60 mL/min    GFR  American >60 >60 mL/min    GFR Comment         Troponin    Collection Time: 07/21/22 12:30 PM   Result Value Ref Range    Troponin, High Sensitivity 37 (H) 0 - 22 ng/L   Brain Natriuretic Peptide    Collection Time: 07/21/22 12:30 PM   Result Value Ref Range    Pro-BNP 1,330 (H) <300 pg/mL   SPECIMEN REJECTION    Collection Time: 07/21/22 12:30 PM   Result Value Ref Range    Specimen Source . BLOOD     Ordered Test PT,PTT     Reason for Rejection Unable to perform testing: No specimen received. Protime-INR    Collection Time: 07/21/22  1:30 PM   Result Value Ref Range    Protime 12.7 (H) 9.4 - 12.6 sec    INR 1.2    APTT    Collection Time: 07/21/22  1:30 PM   Result Value Ref Range    PTT 24.8 21.3 - 31.3 sec   Troponin    Collection Time: 07/21/22  3:10 PM   Result Value Ref Range    Troponin, High Sensitivity 34 (H) 0 - 22 ng/L       Imaging/Diagnostics:  XR CHEST PORTABLE    Result Date: 7/21/2022  1. Patchy left lower lobe airspace opacities. This could represent pneumonia in the appropriate clinical setting. 2.  Mild cardiomegaly with left-sided ICD     CTA CHEST ABDOMEN W CONTRAST    Result Date: 7/21/2022  No evidence of aortic dissection or thoracic aortic aneurysm. Infrarenal abdominal aortic aneurysm, measuring 3-cm. No acute aortic abnormality. No central pulmonary embolism. Chronically occluded right external iliac artery. The visualized bilateral superficial arteries are occluded as well, likely on a chronic basis. Minimal bibasilar atelectasis. Otherwise, clear lungs. Cardiomegaly. 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 The upper left renal pole is viable, perfused separately VA smaller accessory branch. Sigmoid diverticulosis. Enlarged prostate gland. No acute findings within the abdomen or pelvis. RECOMMENDATIONS: 3 cm infrarenal abdominal aortic aneurysm. Recommend follow-up every 3 years. Reference: J Am John Radiol 7312;30:[id number]. 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 ([facility] [address] 7/21/2022 Yes    Coronary artery disease involving native coronary artery of native heart 7/21/2022 Yes    Ischemic cardiomyopathy 7/21/2022 Yes    Stage 3a chronic kidney disease ([facility] [address] 7/21/2022 Yes    Chronic atrial fibrillation ([facility] [address] 7/21/2022 Yes       Plan:     Patient status observation in the Med/Surge    Chest pain/hypotension  Suspect patient is intravascularly depleted due to decreased oral intake  Continue gentle fluids and hold diuretics  Hold Toprol for now  Check echocardiogram  Chronic combined systolic/diastolic CHF  Patient does not have any evidence of fluid overload at this point in time  Anticipate we can resume Lasix tomorrow  Coronary artery disease  Continue home medications  Ischemic cardiomyopathy  AICD in place  Outpatient follow-up with primary cardiologist  Elevated troponin  Patient's troponin elevation is not significant and not consistent with acute coronary syndrome  Suspect secondary to renal dysfunction complicated by hypotension  Check echocardiogram  Chronic atrial fibrillation  Electronically paced  Continue Eliquis  Stage IIIa chronic kidney disease  Creatinine essentially near baseline, BUN slightly elevated from baseline  Suspect slight dehydration, continue with gentle fluids    Consultations:   None    Patient is admitted as observation status    Benjie Perez DO  7/21/2022  4:07 PM    Copy sent to Dr. Jesusita Staton primary care provider on file.

## 2022-07-22 VITALS
WEIGHT: 172.18 LBS | HEART RATE: 69 BPM | BODY MASS INDEX: 25.5 KG/M2 | SYSTOLIC BLOOD PRESSURE: 135 MMHG | TEMPERATURE: 98.4 F | OXYGEN SATURATION: 97 % | DIASTOLIC BLOOD PRESSURE: 52 MMHG | RESPIRATION RATE: 18 BRPM | HEIGHT: 69 IN

## 2022-07-22 LAB
ANION GAP SERPL CALCULATED.3IONS-SCNC: 11 MMOL/L (ref 9–17)
BUN BLDV-MCNC: 27 MG/DL (ref 8–23)
CALCIUM SERPL-MCNC: 8.5 MG/DL (ref 8.6–10.4)
CHLORIDE BLD-SCNC: 105 MMOL/L (ref 98–107)
CO2: 20 MMOL/L (ref 20–31)
CREAT SERPL-MCNC: 1.32 MG/DL (ref 0.7–1.2)
EKG ATRIAL RATE: 60 BPM
EKG P AXIS: 74 DEGREES
EKG P-R INTERVAL: 142 MS
EKG Q-T INTERVAL: 492 MS
EKG QRS DURATION: 168 MS
EKG QTC CALCULATION (BAZETT): 492 MS
EKG R AXIS: -114 DEGREES
EKG T AXIS: -7 DEGREES
EKG VENTRICULAR RATE: 60 BPM
GFR AFRICAN AMERICAN: >60 ML/MIN
GFR NON-AFRICAN AMERICAN: 53 ML/MIN
GFR SERPL CREATININE-BSD FRML MDRD: ABNORMAL ML/MIN/{1.73_M2}
GLUCOSE BLD-MCNC: 94 MG/DL (ref 70–99)
LV EF: 28 %
LVEF MODALITY: NORMAL
POTASSIUM SERPL-SCNC: 4.5 MMOL/L (ref 3.7–5.3)
SODIUM BLD-SCNC: 136 MMOL/L (ref 135–144)

## 2022-07-22 PROCEDURE — 93306 TTE W/DOPPLER COMPLETE: CPT

## 2022-07-22 PROCEDURE — 96361 HYDRATE IV INFUSION ADD-ON: CPT

## 2022-07-22 PROCEDURE — 94760 N-INVAS EAR/PLS OXIMETRY 1: CPT

## 2022-07-22 PROCEDURE — 97166 OT EVAL MOD COMPLEX 45 MIN: CPT

## 2022-07-22 PROCEDURE — G0378 HOSPITAL OBSERVATION PER HR: HCPCS

## 2022-07-22 PROCEDURE — 6370000000 HC RX 637 (ALT 250 FOR IP): Performed by: HOSPITALIST

## 2022-07-22 PROCEDURE — 97535 SELF CARE MNGMENT TRAINING: CPT

## 2022-07-22 PROCEDURE — 99225 PR SBSQ OBSERVATION CARE/DAY 25 MINUTES: CPT | Performed by: HOSPITALIST

## 2022-07-22 PROCEDURE — 97162 PT EVAL MOD COMPLEX 30 MIN: CPT

## 2022-07-22 PROCEDURE — 94664 DEMO&/EVAL PT USE INHALER: CPT

## 2022-07-22 PROCEDURE — 94640 AIRWAY INHALATION TREATMENT: CPT

## 2022-07-22 PROCEDURE — 80048 BASIC METABOLIC PNL TOTAL CA: CPT

## 2022-07-22 PROCEDURE — 97530 THERAPEUTIC ACTIVITIES: CPT

## 2022-07-22 PROCEDURE — 36415 COLL VENOUS BLD VENIPUNCTURE: CPT

## 2022-07-22 PROCEDURE — 2580000003 HC RX 258: Performed by: HOSPITALIST

## 2022-07-22 RX ORDER — METOPROLOL SUCCINATE 25 MG/1
12.5 TABLET, EXTENDED RELEASE ORAL DAILY
Status: DISCONTINUED | OUTPATIENT
Start: 2022-07-22 | End: 2022-07-22 | Stop reason: HOSPADM

## 2022-07-22 RX ORDER — ALBUTEROL SULFATE 2.5 MG/3ML
2.5 SOLUTION RESPIRATORY (INHALATION) EVERY 4 HOURS PRN
Status: DISCONTINUED | OUTPATIENT
Start: 2022-07-22 | End: 2022-07-22

## 2022-07-22 RX ORDER — FUROSEMIDE 40 MG/1
40 TABLET ORAL DAILY
Qty: 30 TABLET | Refills: 0 | Status: ON HOLD
Start: 2022-07-23 | End: 2022-08-19 | Stop reason: SDUPTHER

## 2022-07-22 RX ORDER — METOPROLOL SUCCINATE 25 MG/1
12.5 TABLET, EXTENDED RELEASE ORAL DAILY
Qty: 30 TABLET | Refills: 3 | Status: ON HOLD | OUTPATIENT
Start: 2022-07-23 | End: 2022-08-19 | Stop reason: HOSPADM

## 2022-07-22 RX ORDER — LISINOPRIL 2.5 MG/1
2.5 TABLET ORAL DAILY
Status: DISCONTINUED | OUTPATIENT
Start: 2022-07-22 | End: 2022-07-22 | Stop reason: HOSPADM

## 2022-07-22 RX ORDER — FUROSEMIDE 20 MG/1
40 TABLET ORAL DAILY
Status: DISCONTINUED | OUTPATIENT
Start: 2022-07-22 | End: 2022-07-22 | Stop reason: HOSPADM

## 2022-07-22 RX ORDER — LISINOPRIL 2.5 MG/1
2.5 TABLET ORAL DAILY
Qty: 30 TABLET | Refills: 3 | Status: ON HOLD | OUTPATIENT
Start: 2022-07-23 | End: 2022-09-20 | Stop reason: DRUGHIGH

## 2022-07-22 RX ADMIN — ROSUVASTATIN 20 MG: 20 TABLET, FILM COATED ORAL at 06:46

## 2022-07-22 RX ADMIN — ALBUTEROL SULFATE 2 PUFF: 90 AEROSOL, METERED RESPIRATORY (INHALATION) at 08:10

## 2022-07-22 RX ADMIN — PANTOPRAZOLE SODIUM 40 MG: 40 TABLET, DELAYED RELEASE ORAL at 06:46

## 2022-07-22 RX ADMIN — APIXABAN 5 MG: 5 TABLET, FILM COATED ORAL at 09:22

## 2022-07-22 RX ADMIN — SODIUM CHLORIDE, PRESERVATIVE FREE 10 ML: 5 INJECTION INTRAVENOUS at 09:26

## 2022-07-22 RX ADMIN — METOPROLOL SUCCINATE 12.5 MG: 25 TABLET, EXTENDED RELEASE ORAL at 09:22

## 2022-07-22 NOTE — DISCHARGE SUMMARY
[facility]  Office: [address], DO, Fiona Marty, DO, [place] Mail, DO, Huong Garrido Blood, DO, Remy Montoya MD, Rowan Mina MD, Jonathan Arredondo MD, Rea Mcmullen MD,  Kesha Kimble MD, Nelia Huffman MD, Orlin Hernandez, DO, Leonel Samayoa MD,  Rajesh Machado MD, Juan Stewart MD, Concha Garcia, DO, Amber Cain MD, Cesia Mayo MD, Feliberto Sanches MD, Wisam Benitez, DO, Conor Henry MD, Douglas Leija MD, Gely Mom, CNP,  Soto Raw, CNP, rTacy Guerrero, CNP, Nyla Booty, CNP, Elaine Glover PA-C, Ewelina Guzman, DNP, Wero Tubbs, CNP, Janee Beat, CNP, Mode Class, CNP, Ameena Simental, CNP, Lisandro Fischer, CNS, Silvina Mendoza, [facility], Kaila Roy, CNP, Akil Croft, CNP, Astrid Parr, CNP, Sin Malone, CNP           [facility]    Discharge Summary     Patient ID: Kely Barron  :     MRN: [id number]     ACCOUNT:  [de-identified]   Patient's PCP: No primary care provider on file. 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 *      Admission Condition:  fair     Discharged Condition: good    Hospital Stay:     Hospital Course:   Kely Barron is a 68 y.o. male who was admitted for the management of  Hypotension , presented to ER with Chest Pain    This very pleasant 42-year-old male who originally called EMS due to chest pain. He was given nitroglycerin and subsequently developed a unresponsive episode. He was markedly hypotensive. He was brought to the hospital and did regain responsiveness without any direct intervention. He was treated with fluid resuscitation. He received 2 L of fluid in the emergency room and subsequently received gentle fluids overnight. He has a known history of systolic dysfunction has been AICD in place. Troponins were only minimally elevated consistent with demand ischemia due to hypotension. He underwent echocardiogram which showed an ejection fraction of 25 to 30%. His chest pain resolved with fluid resuscitation. With history taking it became apparent that the patient had not been drinking much fluid as of late and subsequently clinically became dehydrated. His chest pain was felt to be secondary to dehydration and intravascular depletion. His chest pain resolved with fluids. Patient has been encouraged to maintain adequate oral intake. That Lasix has been decreased to once daily contrary to reported twice daily. That said the patient did report that he is only been taking it once daily. As the patient's symptoms resolved and he returned to baseline he is discharged home in stable condition.     Significant therapeutic interventions: As above    Significant Diagnostic Studies:   Labs / Micro:  CBC:   Lab Results   Component Value Date/Time    WBC 11.1 07/21/2022 12:30 PM    RBC 4.36 07/21/2022 12:30 PM    HGB 12.3 07/21/2022 12:30 PM    HCT 38.1 07/21/2022 12:30 PM    MCV 87.4 07/21/2022 12:30 PM    MCH 28.3 07/21/2022 12:30 PM    MCHC 32.4 07/21/2022 12:30 PM    RDW 14.1 07/21/2022 12:30 PM     07/21/2022 12:30 PM     BMP:    Lab Results   Component Value Date/Time    GLUCOSE 94 07/22/2022 06:39 AM     07/22/2022 06:39 AM    K 4.5 07/22/2022 06:39 AM     07/22/2022 06:39 AM    CO2 20 07/22/2022 06:39 AM    ANIONGAP 11 07/22/2022 06:39 AM    BUN 27 07/22/2022 06:39 AM    CREATININE 1.32 07/22/2022 06:39 AM    BUNCRER NOT REPORTED 01/29/2016 06:38 PM    CALCIUM 8.5 07/22/2022 06:39 AM    LABGLOM 53 07/22/2022 06:39 AM    GFRAA >60 07/22/2022 06:39 AM    GFR      07/22/2022 06:39 AM        Radiology:  XR CHEST PORTABLE    Result Date: 7/21/2022  1. Patchy left lower lobe airspace opacities. This could represent pneumonia in the appropriate clinical setting. 2.  Mild cardiomegaly with left-sided ICD     CTA CHEST ABDOMEN PELVIS W CONTRAST    Result Date: 7/21/2022  No evidence of aortic dissection or thoracic aortic aneurysm. Infrarenal abdominal aortic aneurysm, measuring 3-cm. No acute aortic abnormality. No central pulmonary embolism. Chronically occluded right external iliac artery. The visualized bilateral superficial arteries are occluded as well, likely on a chronic basis. Minimal bibasilar atelectasis. Otherwise, clear lungs. Cardiomegaly. 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 The upper left renal pole is viable, perfused separately VA smaller accessory branch. Sigmoid diverticulosis. Enlarged prostate gland. No acute findings within the abdomen or pelvis. RECOMMENDATIONS: 3 cm infrarenal abdominal aortic aneurysm. Recommend follow-up every 3 years. Reference: J Am John Radiol 1256;13:680-999. CTA CHEST ABDOMEN W CONTRAST    Result Date: 7/21/2022  No evidence of aortic dissection or thoracic aortic aneurysm. Infrarenal abdominal aortic aneurysm, measuring 3-cm. No acute aortic abnormality. No central pulmonary embolism. Chronically occluded right external iliac artery. The visualized bilateral superficial arteries are occluded as well, likely on a chronic basis. Minimal bibasilar atelectasis. Otherwise, clear lungs. Cardiomegaly. 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 The upper left renal pole is viable, perfused separately VA smaller accessory branch.  Sigmoid diverticulosis. Enlarged prostate gland. No acute findings within the abdomen or pelvis. RECOMMENDATIONS: 3 cm infrarenal abdominal aortic aneurysm. Recommend follow-up every 3 years. Reference: J Am John Radiol 3017;51:696-792. Consultations:    Consults:     Final Specialist Recommendations/Findings:   None      The patient was seen and examined on day of discharge and this discharge summary is in conjunction with any daily progress note from day of discharge. Discharge plan:     Disposition: Home    Physician Follow Up: Follow-up with [facility] for care    Requiring Further Evaluation/Follow Up POST HOSPITALIZATION/Incidental Findings: None    Diet: regular diet    Activity: As tolerated    Instructions to Patient: Patient instructed to drink at least 3 bottles of water daily    Discharge Medications:      Medication List        CHANGE how you take these medications      furosemide 40 MG tablet  Commonly known as: LASIX  Take 1 tablet by mouth in the morning. Start taking on: July 23, 2022  What changed: when to take this     lisinopril 2.5 MG tablet  Commonly known as: PRINIVIL;ZESTRIL  Take 1 tablet by mouth in the morning. Start taking on: July 23, 2022  What changed:   medication strength  how much to take     metoprolol succinate 25 MG extended release tablet  Commonly known as: TOPROL XL  Take 0.5 tablets by mouth in the morning.   Start taking on: July 23, 2022  What changed:   how much to take  additional instructions            CONTINUE taking these medications      acetaminophen 325 MG tablet  Commonly known as: TYLENOL     apixaban 5 MG Tabs tablet  Commonly known as: ELIQUIS     chlorhexidine 4 % external liquid  Commonly known as: HIBICLENS     ibuprofen 600 MG tablet  Commonly known as: IBU  Take 1 tablet by mouth every 6 hours as needed for Pain     nitroGLYCERIN 0.4 MG SL tablet  Commonly known as: NITROSTAT     omeprazole 20 MG delayed release capsule  Commonly known as: PRILOSEC     Rosuvastatin Calcium 20 MG Cpsp     Ventolin  (90 Base) MCG/ACT inhaler  Generic drug: albuterol sulfate HFA               Where to Get Your Medications        These medications were sent to [address] [place] [address] Drive, [address] [place], [place] [address]      Phone: [phone]   lisinopril 2.5 MG tablet  metoprolol succinate 25 MG extended release tablet       Information about where to get these medications is not yet available    Ask your nurse or doctor about these medications  furosemide 40 MG tablet         No discharge procedures on file. Time Spent on discharge is  20 mins in patient examination, evaluation, counseling as well as medication reconciliation, prescriptions for required medications, discharge plan and follow up. Electronically signed by   Lavelle Robb DO  7/22/2022  2:54 PM      Thank you Dr. Ochoa Situ primary care provider on file. for the opportunity to be involved in this patient's care.

## 2022-07-22 NOTE — PROGRESS NOTES
Occupational Therapy  Facility/Department: [facility]  Occupational Therapy Initial Assessment    Name: Chanel Mcduffie  : 1945  MRN: [id number]  Date of Service: 2022    Discharge Recommendations:  Patient would benefit from continued therapy after discharge       Patient Diagnosis(es): The encounter diagnosis was Chest pain, unspecified type. Past Medical History:  has a past medical history of Hyperlipidemia, Hypertension, MI (myocardial infarction) ([facility] and [facility] Past Surgical History:  has a past surgical history that includes pacemaker placement and Coronary angioplasty with stent. Assessment   Performance deficits / Impairments: Decreased functional mobility ; Decreased ADL status; Decreased balance;Decreased safe awareness;Decreased endurance;Decreased strength;Decreased fine motor control;Decreased coordination;Decreased high-level IADLs  Assessment: Pt with baseline deficits to BUE/BLE ROM/strength/coordination, balance, and activity tolerance secondary to MS diagnosis. Pt at this time is further limited due to decreased activity tolerance as pt with significant work of breathing following minimal exertion. Additionally, pt is limited due to an unfamiliar environment without pt's home equipment as pt reports utilizing equipment/adaptive techniques in his home environment to allow for increased independence with ADL performance. Based on functional ability this date, pt would be unsafe to return to prior living arrangements without 24hr assistance and continued skilled therapy intervention.    Prognosis: Good  Decision Making: Medium Complexity  REQUIRES OT FOLLOW-UP: Yes  Activity Tolerance  Activity Tolerance: Patient limited by fatigue      Education Given To: Patient  Education Provided: Role of Therapy;Plan of Care (Activity Promotion, Bed Mobility Techniques, Safety with Transfers, Pursed Lip Breathing Techniques; fair return)  Education Method: Demonstration;Verbal  Education Outcome: Verbalized understanding;Continued education needed  Safety Devices  Type of Devices: Call light within reach; Left in chair;Chair alarm in place;Nurse notified;Gait belt  Restraints  Restraints Initially in Place: No    Plan   Plan  Times per Week: 5-6x/wk  Times per Day: Daily  Current Treatment Recommendations: Strengthening, Balance training, Functional mobility training, Endurance training, Patient/Caregiver education & training, Equipment evaluation, education, & procurement, Self-Care / ADL, Home management training, Safety education & training     Restrictions  Restrictions/Precautions  Restrictions/Precautions: Fall Risk  Required Braces or Orthoses?: No  Position Activity Restriction  Other position/activity restrictions: Up with assistance    Subjective   General  Patient assessed for rehabilitation services?: Yes  Family / Caregiver Present: No  General Comment  Comments: RN ok'd for therapy this AM. Pt agreeable to participate in session and pleasant/cooperative throughout. Pt reports improvement to SOB and chest pain since admission stating 2/10 chest pain; SpO2 monitored and maintained above 91% however pt noted with significant work of breathing during activity.     Social/Functional History  Social/Functional History  Lives With: Alone  Type of Home: House  Home Layout: One level  Home Access: Ramped entrance  Bathroom Shower/Tub: Tub/Shower unit, Walk-in shower (Pt reports use of tub/shower)  Bathroom Toilet: Handicap height  Bathroom Equipment: Tub transfer bench, Grab bars in shower, Grab bars around toilet  Bathroom Accessibility: Wheelchair accessible  Home Equipment: Electric scooter  Has the patient had two or more falls in the past year or any fall with injury in the past year?: No  Receives Help From: Friend(s) Yari Viera assists pt with IADLs and driving, etc as needed)  ADL Assistance: [address] [place] [address]: Needs assistance  Homemaking Responsibilities: Yes  Meal Prep Responsibility: Secondary (Mostly does door dash or other delivery of meals)  Laundry Responsibility: Primary  Cleaning Responsibility: No (Hired)  Shopping Responsibility: Secondary  Ambulation Assistance:  (Pt reports he doesn't typically ambulate- use of electric scooter. He states occasionally he gets up and \"furniture\" walks- but hasn't done this in the past month. States with his MS he hasn't been as ambulatory.)  Transfer Assistance: Independent  Active : No  Patient's  Info: Antoni ArmentaMiguel  Mode of Transportation: Friends, Pixie Lav  Occupation: Retired  Type of Occupation:  and [address]: Enjoys bidding on horse racing    Objective   Vision  Vision: Impaired  Vision Exceptions: Wears glasses for reading  Hearing  Hearing: Exceptions to [facility]  Hearing Exceptions: Hard of hearing/hearing concerns      Balance  Sitting: Stand by assistance (unsupported seated EOB and seated at edge of recliner chair ~6 minutes total)  Standing: Moderate assistance (min A to mod A required throughout short bouts of standing ~10 seconds 2x; significant forward trunk flexion with high reliance of BUE on bed rails/arm rests)       AROM:  (full AROM to bilateral shoulders and elbows in all planes; full AROM to L hand at all joints in all planes, slightly decreased AROM to R hand during extension at all joints of hand/digits; no AROM bilateral wrists)  Strength:  (BUE grossly 4+/5 at shoulder/elbow, 3+/5 R hand grasp, 4-/5 L hand grasp; wrist not functional)  Coordination: Grossly decreased, non-functional (BUE FMC/BUE GMC; decreased speed, decreased accurcay)  Sensation: Impaired (Pt reports numbness/tingling to bilateral hands/feet at baseline secondary to MS)    ADL  Feeding: Increased time to complete  Grooming: Increased time to complete;Minimal assistance  UE Bathing: Increased time to complete;Minimal assistance  LE Bathing: Increased time to complete; Moderate assistance  UE Dressing: Increased time to complete;Minimal assistance  LE Dressing: Increased time to complete;Minimal assistance (supine in bed bringing knees to chest to thread BLE into pants, seated EOB with lateral weight shifting to pull up pants over bottoms/hips)  Toileting: Increased time to complete; Moderate assistance       Bed mobility  Supine to Sit: Minimal assistance (significant use of hand rail)  Sit to Supine:  (retired to chair at end of session)  Scooting: Contact guard assistance    Transfers  Stand Pivot Transfers: Moderate assistance (squat pivot transfer from bed > chair; significant reliance of BUE on bed rail/arm rests throughout)  Sit to stand: Moderate assistance (performed 2x- 1x from EOB, 1x from chair)  Stand to sit: Minimal assistance  Transfer Comments: Min VCs for sequencing/safety awareness as pt with noted impulsivity and quick pace. Cognition  Overall Cognitive Status: Exceptions  Following Commands:  Follows all commands without difficulty  Safety Judgement: Decreased awareness of need for assistance;Decreased awareness of need for safety  Problem Solving: Assistance required to identify errors made;Assistance required to generate solutions  Insights: Decreased awareness of deficits  Initiation: Does not require cues  Sequencing: Requires cues for some  Orientation  Overall Orientation Status: Within Functional Limits         AM-PAC Score   AM-[facility] Inpatient Daily Activity Raw Score: 16 (07/22/22 1339)  AM-PAC Inpatient ADL T-Scale Score : 35.96 (07/22/22 1339)  ADL Inpatient CMS 0-100% Score: 53.32 (07/22/22 1339)  ADL Inpatient CMS G-Code Modifier : CK (07/22/22 1339)    Goals  Short Term Goals  Time Frame for Short term goals: 14 visits  Short Term Goal 1: Pt will perform ADL tasks with mod IND using AE/DME PRN  Short Term Goal 2: Pt will perform functional transfers with mod IND using least restrictive AD  Short Term Goal 3: Pt will independently demo good safety awareness during engagement in all ADLs and functional transfers       Therapy Time   Individual Concurrent Group Co-treatment   Time In 0908         Time Out 0936         Minutes 28         Timed Code Treatment Minutes: 280[facility]       ESTEVAN Huerta/ANSHUL

## 2022-07-22 NOTE — PROGRESS NOTES
Patient states he has home cpap unit at home but he doesn't wear it. Patient declined use of hospital cpap unit. No resp distress noted.

## 2022-07-22 NOTE — PROGRESS NOTES
[facility]  Office: [address], DO, Rivergabriel Garcia, DO, Marilu Duane, DO, Herbert Kahn Blood, DO, Nicolasa Maloney MD, Holger Chavarria MD, Miguel Tran MD, Kaylie Danielle MD,  Emanuel Todd MD, Billy Dougherty MD, Bill Cunningham, DO, Bianca Bentley MD,  João Ruiz MD, Kam King MD, Diana Will, DO, Shantal Reyes MD, Dana Tyler MD, Eugenia Lima MD, Pam Holloway DO, Akiko Santana MD, Boby Voss MD, Usha Albert, CNP,  Rayne Zuñiga, CNP, Yoli Malik, CNP, Delbert Aase, CNP, Kyleigh Boss PA-C, Maci Huntley, [facility], Aubree Wyatt, CNP, Aruna Macias, CNP, Nir Burns, [facility], [facility] Reinaldo, CNP, Tanda Lesches, CNS, Srinivas Shah, [facility], Carlos Knight, CNP, Veronica Rincon, CNP, Gorman Oppenheim, CNP, Tabitha Villatoro, [facility]           [facility]    Progress Note    7/22/2022    1:29 PM    Name:   Arsen Almanza  MRN:     [id number]     Acct:      [de-identified]   Room:   [address] [place] [address]   Day:  0  Admit Date:  7/21/2022 12:23 PM    PCP:   No primary care provider on file. Code Status:  Full Code    Subjective:     C/C:   Chief Complaint   Patient presents with    Chest Pain     Patient seen in follow-up for chest pain secondary to suspected intravascular depletion/hypotension. Patient states \"I am sore but I feel better\"    Interval History Status: improved. Patient is doing reasonably well. He still has some mild chest pain but it appears to be noncardiac in nature. It is slightly worse with breathing but overall has improved dramatically since admission. Echocardiogram has been completed with results pending. IV fluids have been discontinued at this point in time and hemodynamically he is stable. I am going to resume his home medications and monitor accordingly.   Ultimately if his echocardiogram does not show any significant abnormalities beyond the known systolic dysfunction he already has he could be discharged home with outpatient follow-up. Brief History: This very pleasant 70-year-old male presented to the hospital with chest pain secondary to profound hypotension. He acknowledges he has not been drinking fluids as he normally does and has been maintaining his diuretics. Clinically he appears to be intravascularly depleted leading to his hypotension and chest pain. He responded well to IV fluids. He is presently at his baseline function. Review of Systems:     Constitutional:  negative for chills, fevers, sweats  Respiratory:  negative for cough, dyspnea on exertion, shortness of breath, wheezing  Cardiovascular:  negative for chest pain, chest pressure/discomfort, lower extremity edema, palpitations  Gastrointestinal:  negative for abdominal pain, constipation, diarrhea, nausea, vomiting  Neurological:  negative for dizziness, headache    Medications: Allergies: Allergies   Allergen Reactions    Codeine     Doxycycline     Erythromycin     Gammagard [Immune Globulin]     Sulfa Antibiotics        Current Meds:   Scheduled Meds:    furosemide  40 mg Oral Daily    lisinopril  2.5 mg Oral Daily    metoprolol succinate  12.5 mg Oral Daily    apixaban  5 mg Oral BID    pantoprazole  40 mg Oral QAM AC    rosuvastatin  20 mg Oral Daily    sodium chloride flush  5-40 mL IntraVENous 2 times per day     Continuous Infusions:    sodium chloride       PRN Meds: sodium chloride flush, albuterol sulfate HFA, nitroGLYCERIN, sodium chloride flush, sodium chloride, potassium chloride **OR** potassium alternative oral replacement **OR** potassium chloride, magnesium sulfate, ondansetron **OR** ondansetron, polyethylene glycol, acetaminophen **OR** acetaminophen    Data:     Past Medical History:   has a past medical history of Hyperlipidemia, Hypertension, MI (myocardial infarction) ([facility] and MS (multiple sclerosis) ([facility] Social History:   reports that he has quit smoking.  He has never used smokeless tobacco. He reports that he does not drink alcohol and does not use drugs. Family History: History reviewed. No pertinent family history. Vitals:  BP (!) 135/52   Pulse 69   Temp 98.4 °F (36.9 °C) (Oral)   Resp 18   Ht 5' 9\" (1.753 m)   Wt 172 lb 2.9 oz (78.1 kg)   SpO2 97%   BMI 25.43 kg/m²   Temp (24hrs), Av.1 °F (36.7 °C), Min:97.9 °F (36.6 °C), Max:98.4 °F (36.9 °C)    No results for input(s): POCGLU in the last 72 hours. I/O (24Hr): No intake or output data in the 24 hours ending 22 1329    Labs:  Hematology:  Recent Labs     22  1230 22  1330   WBC 11.1*  --    RBC 4.36*  --    HGB 12.3*  --    HCT 38.1*  --    MCV 87.4  --    MCH 28.3  --    MCHC 32.4  --    RDW 14.1  --      --    MPV 7.7  --    INR  --  1.2     Chemistry:  Recent Labs     22  1230 22  1510 22  0639     --  136   K 4.3  --  4.5     --  105   CO2 22  --  20   GLUCOSE 131*  --  94   BUN 34*  --  27*   CREATININE 1.35*  --  1.32*   ANIONGAP 12  --  11   LABGLOM 51*  --  53*   GFRAA >60  --  >60   CALCIUM 8.8  --  8.5*   PROBNP 1,330*  --   --    TROPHS 37* 34*  --      Recent Labs     22  1230   PROT 6.5   LABALBU 3.8   AST 16   ALT 12   ALKPHOS 67   BILITOT 0.38     ABG:No results found for: POCPH, PHART, PH, POCPCO2, DYD0NMN, PCO2, POCPO2, PO2ART, PO2, POCHCO3, PGD8SMS, HCO3, NBEA, PBEA, BEART, BE, THGBART, THB, KOQ4VHH, GLTF1CKS, N0CDYNJE, O2SAT, FIO2  No results found for: SPECIAL  No results found for: CULTURE    Radiology:  XR CHEST PORTABLE    Result Date: 2022  1. Patchy left lower lobe airspace opacities. This could represent pneumonia in the appropriate clinical setting. 2.  Mild cardiomegaly with left-sided ICD     CTA CHEST ABDOMEN PELVIS W CONTRAST    Result Date: 2022  No evidence of aortic dissection or thoracic aortic aneurysm. Infrarenal abdominal aortic aneurysm, measuring 3-cm. No acute aortic abnormality. 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 ([facility] 7/21/2022 Yes    Coronary artery disease involving native coronary artery of native heart 7/21/2022 Yes    Ischemic cardiomyopathy 7/21/2022 Yes    Stage 3a chronic kidney disease ([facility] 7/21/2022 Yes    Chronic atrial fibrillation ([facility] 7/21/2022 Yes       Plan:     Chest pain  Await echocardiogram  Discharge home if no significant abnormalities beyond baseline function  Hypotension  Resolved with fluid resuscitation  Suspect secondary to decreased oral intake  Stage IIIa chronic kidney disease  At baseline  Chronic atrial fibrillation  Electronically paced  Continue anticoagulation    Awaiting echocardiogram, anticipate discharge home later today    Abner Connell DO  7/22/2022  1:29 PM Xolair Counseling:  Patient informed of potential adverse effects including but not limited to fever, muscle aches, rash and allergic reactions.  The patient verbalized understanding of the proper use and possible adverse effects of Xolair.  All of the patient's questions and concerns were addressed.

## 2022-07-22 NOTE — PROGRESS NOTES
Physical Therapy  Facility/Department: [facility] SURG ICU  Physical Therapy Initial Assessment    Name: Rajiv Bryson  : 1945  MRN: [id number]  Date of Service: 2022  Chief Complaint   Patient presents with    Chest Pain       Discharge Recommendations:  Patient would benefit from continued therapy after discharge   PT Equipment Recommendations  Equipment Needed: No  Other: Pt owns an electric scooter with a w/c accessible home- writer recommends continued pivot to scooter for mobility. Patient Diagnosis(es): The encounter diagnosis was Chest pain, unspecified type. Past Medical History:  has a past medical history of Hyperlipidemia, Hypertension, MI (myocardial infarction) ([facility] and MS (multiple sclerosis) ([facility] Past Surgical History:  has a past surgical history that includes pacemaker placement and Coronary angioplasty with stent. Assessment   Body Structures, Functions, Activity Limitations Requiring Skilled Therapeutic Intervention: Decreased functional mobility ; Decreased endurance;Decreased strength;Decreased safe awareness;Decreased sensation;Decreased balance  Assessment: Pt with noted mobility deficits requiring min to mod A for every aspect of mobility today. Pt with noted diagnosis of MS in [address] [place] [address] with pt report of baseline mobility deficits. Based on today's mobility, pt would be unsafe to return to prior living arrangements without / assistance secondary to physical assistance required with each aspect of mobility and will also benefit from further therapy at discharge.   Therapy Prognosis: Good  Decision Making: Medium Complexity  Requires PT Follow-Up: Yes  Activity Tolerance  Activity Tolerance: Patient limited by fatigue;Patient limited by endurance  Activity Tolerance Comments: Increased work of breathing but oxygen saturation remains 91% or above with all mobility     Plan   Plan  Plan:  (5-6x)  Current Treatment Recommendations: Strengthening, Balance training, Functional mobility training, Transfer training, Endurance training, Gait training, Wheelchair mobility training, Neuromuscular re-education, Safety education & training, Home exercise program, Patient/Caregiver education & training, Positioning, Therapeutic activities  Safety Devices  Type of Devices: Call light within reach, Left in chair, Chair alarm in place, Nurse notified, Gait belt  Restraints  Restraints Initially in Place: No     Restrictions  Restrictions/Precautions  Restrictions/Precautions: Fall Risk  Required Braces or Orthoses?: No  Position Activity Restriction  Other position/activity restrictions: Up with assistance     Subjective   General  Patient assessed for rehabilitation services?: Yes  Response To Previous Treatment: Not applicable  Family / Caregiver Present: No  Follows Commands: Within Functional Limits  Subjective  Subjective: Pt supine in bed and agreeable to therapy. Pt reports he was experiencing chest pains upon arrival to hospital but they have since improved.          Social/Functional History  Social/Functional History  Lives With: Alone  Type of Home: House  Home Layout: One level  Home Access: Ramped entrance  Bathroom Shower/Tub: Tub/Shower unit, Walk-in shower (Pt reports use of tub/shower)  Bathroom Toilet: Handicap height  Bathroom Equipment: Tub transfer bench, Grab bars in shower, Grab bars around toilet  Bathroom Accessibility: Wheelchair accessible  Home Equipment: Electric scooter  Has the patient had two or more falls in the past year or any fall with injury in the past year?: No  Receives Help From: Friend(s) Angelo Aurora assists pt with IADLs and driving, etc as needed)  ADL Assistance: Independent  Homemaking Assistance: Needs assistance  Homemaking Responsibilities: Yes  Meal Prep Responsibility: Secondary (Mostly does door dash or other delivery of meals)  Laundry Responsibility: Primary  Cleaning Responsibility: No (Hired)  Shopping Responsibility: Secondary  Ambulation Assistance:  (Pt reports he doesn't typically ambulate- use of electric scooter. He states occasionally he gets up and \"furniture\" walks- but hasn't done this in the past month. States with his MS he hasn't been as ambulatory.)  Transfer Assistance: Independent  Active : No  Patient's  Info: Kimmie Mathur  Mode of Transportation: Friends, Fidencio Hockey  Occupation: Retired  Type of Occupation:  and [address]: Enjoys bidding on horse racing  Vision/Hearing  Vision  Vision: Impaired  Vision Exceptions: Wears glasses for reading  Hearing  Hearing: Exceptions to [facility]  Hearing Exceptions: Hard of hearing/hearing concerns    Cognition   Orientation  Overall Orientation Status: Within Functional Limits  Cognition  Overall Cognitive Status: Exceptions  Following Commands:  Follows all commands without difficulty  Safety Judgement: Decreased awareness of need for assistance;Decreased awareness of need for safety  Problem Solving: Assistance required to identify errors made;Assistance required to generate solutions  Insights: Decreased awareness of deficits  Initiation: Does not require cues  Sequencing: Requires cues for some     Objective     Gross Assessment  Sensation: Impaired (Pt reports chronic numbness and tingling to bilateral hands secondary to MS)     AROM RLE (degrees)  RLE AROM: WFL  RLE General AROM: AAROM  AROM LLE (degrees)  LLE AROM : WFL  LLE General AROM: AAROM  Strength RLE  Strength RLE: Exception  R Hip Flexion: 4/5  R Knee Flexion: 4-/5  R Knee Extension: 3/5  R Ankle Dorsiflexion: 0/5  R Ankle Plantar flexion: 0/5  Strength LLE  Strength LLE: Exception  L Hip Flexion: 4/5  L Knee Flexion: 4-/5  L Knee Extension: 3+/5  L Ankle Dorsiflexion: 0/5  L Ankle Plantar Flexion: 0/5  Strength RUE  Comment: Co evaluation with OT-see OT evaluation for full UE assessment  Strength LUE  Comment: Co evaluation with OT-see OT evaluation for full UE assessment Bed mobility  Supine to Sit: Minimal assistance (significant use of hand rail)  Sit to Supine:  (retired to chair at end of session)  Scooting: Contact guard assistance  Transfers  Sit to Stand:  Moderate Assistance  Stand to sit: Minimal Assistance  Squat Pivot Transfers: Minimal Assistance (verbal cues for sequencing)  Ambulation  More Ambulation?: No  Stairs/Curb  Stairs?: No     Balance  Posture: Fair  Sitting - Static: Fair  Sitting - Dynamic: Fair;-  Standing - Static: Fair;-  Standing - Dynamic: Poor;+  Comments: standing balance assessed without device           AM-PAC Score     AM-PAC Inpatient Mobility without Stair Climbing Raw Score : 10 (07/22/22 1333)  AM-PAC Inpatient without Stair Climbing T-Scale Score : 34.07 (07/22/22 1333)  Mobility Inpatient CMS 0-100% Score: 71.66 (07/22/22 1333)  Mobility Inpatient without Stair CMS G-Code Modifier : CL (07/22/22 1333)           Goals  Short Term Goals  Time Frame for Short term goals: 14 visits  Short term goal 1: Pt to demonstrate independent bed mobility  Short term goal 2: Pt to sit<>stand transfer modified independently with UE support  Short term goal 3: Pt to stand pivot bed<>w/c modified independently with UE support  Short term goal 4: Pt to demonstrate good seated and fair + standing balance to decrease risk of falls       Education  Patient Education  Education Given To: Patient  Education Provided: Role of Therapy;Plan of Care;Transfer Training  Education Method: Verbal  Barriers to Learning: None  Education Outcome: Verbalized understanding      Therapy Time   Individual Concurrent Group Co-treatment   Time In 0908         Time Out 0936         Minutes 28         Timed Code Treatment Minutes: 0636 [facility] Radha Corona PT

## 2022-07-22 NOTE — PLAN OF CARE
Problem: Safety - Adult  Goal: Free from fall injury  7/22/2022 0614 by Ronn Null RN  Outcome: Progressing     Problem: Skin/Tissue Integrity  Goal: Absence of new skin breakdown  Description: 1. Monitor for areas of redness and/or skin breakdown  2. Assess vascular access sites hourly  3. Every 4-6 hours minimum:  Change oxygen saturation probe site  4. Every 4-6 hours:  If on nasal continuous positive airway pressure, respiratory therapy assess nares and determine need for appliance change or resting period.   Outcome: Progressing     Problem: ABCDS Injury Assessment  Goal: Absence of physical injury  7/22/2022 5686 by Ronn Null RN  Outcome: Progressing     Problem: Pain  Goal: Verbalizes/displays adequate comfort level or baseline comfort level  7/22/2022 0614 by Ronn Null RN  Outcome: Progressing     Problem: Discharge Planning  Goal: Discharge to home or other facility with appropriate resources  7/22/2022 0614 by Ronn Null RN  Outcome: Progressing  Flowsheets (Taken 7/21/2022 2130)  Discharge to home or other facility with appropriate resources: Identify barriers to discharge with patient and caregiver  7/21/2022 1909 by Maria C Marte RN  Outcome: Progressing  Flowsheets (Taken 7/21/2022 1831)  Discharge to home or other facility with appropriate resources:   Identify barriers to discharge with patient and caregiver   Arrange for needed discharge resources and transportation as appropriate   Identify discharge learning needs (meds, wound care, etc)

## 2022-07-22 NOTE — PLAN OF CARE
Respiratory in to assess patient. He is sitting in bed and denies sob currently. Room air SpO2 96%. States he did get sob while walking to bathroom. States he quit smoking 30 years ago. States he did have seasonal asthma allergies years ago. States he has a home CPAP unit but does not wear it. He was offered a hospital unit last night but stated it was \" too late\" to put in on. He continues to have an  upper airway expiratory \"grunting\", unchanged post albuterol MDI administration. Will continue plan of care.

## 2022-07-22 NOTE — PLAN OF CARE
Problem: Discharge Planning  Goal: Discharge to home or other facility with appropriate resources  7/22/2022 1541 by Lino Haley RN  Outcome: Completed  7/22/2022 0734 by Sylvia River RN  Outcome: Progressing  7/22/2022 0614 by Sylvia River RN  Outcome: Progressing  Flowsheets (Taken 7/21/2022 2130)  Discharge to home or other facility with appropriate resources: Identify barriers to discharge with patient and caregiver   Pt discharged to home

## 2022-07-22 NOTE — CARE COORDINATION
07/22/22 0839   Service Assessment   Patient Orientation Alert and Oriented   Cognition Alert   History Provided By Patient   Primary [address]   PCP Verified by CM Yes  ([facility])   Prior Functional Level Independent in ADLs/IADLs   Current Functional Level Independent in ADLs/IADLs   Can patient return to prior living arrangement Yes   Ability to make needs known: Good   Family able to assist with home care needs: Yes   Social/Functional History   Lives With Alone   Type of [address] One [address]   Home Access Ramped entrance   Bathroom Shower/Tub Tub/Shower unit   [facility] Independent   Transfer Assistance Independent   Active  No   Discharge Planning   Type of [address] [place] [address] Prior To Admission None   Potential Assistance Needed N/A   Type of [address] None   Patient expects to be discharged to: [address] One story       D/c home, trying to secure transportation home, [address] [facility] patient

## 2022-07-27 ENCOUNTER — APPOINTMENT (OUTPATIENT)
Dept: GENERAL RADIOLOGY | Age: 77
DRG: 280 | End: 2022-07-27
Payer: COMMERCIAL

## 2022-07-27 ENCOUNTER — HOSPITAL ENCOUNTER (INPATIENT)
Age: 77
LOS: 3 days | Discharge: NURSING, REHAB OR LONG-TERM CARE | DRG: 280 | End: 2022-07-30
Attending: EMERGENCY MEDICINE | Admitting: STUDENT IN AN ORGANIZED HEALTH CARE EDUCATION/TRAINING PROGRAM
Payer: COMMERCIAL

## 2022-07-27 DIAGNOSIS — I21.4 NSTEMI (NON-ST ELEVATED MYOCARDIAL INFARCTION) (HCC): Primary | ICD-10-CM

## 2022-07-27 LAB
-: ABNORMAL
ABSOLUTE EOS #: 0.1 K/UL (ref 0–0.4)
ABSOLUTE LYMPH #: 1 K/UL (ref 1–4.8)
ABSOLUTE MONO #: 1.3 K/UL (ref 0.1–1.2)
AMORPHOUS: ABNORMAL
ANION GAP SERPL CALCULATED.3IONS-SCNC: 12 MMOL/L (ref 9–17)
BACTERIA: ABNORMAL
BASOPHILS # BLD: 1 % (ref 0–2)
BASOPHILS ABSOLUTE: 0.1 K/UL (ref 0–0.2)
BILIRUBIN URINE: NEGATIVE
BUN BLDV-MCNC: 22 MG/DL (ref 8–23)
CALCIUM SERPL-MCNC: 9.1 MG/DL (ref 8.6–10.4)
CASTS UA: ABNORMAL /LPF
CHLORIDE BLD-SCNC: 99 MMOL/L (ref 98–107)
CO2: 25 MMOL/L (ref 20–31)
COLOR: YELLOW
CREAT SERPL-MCNC: 1.4 MG/DL (ref 0.7–1.2)
EOSINOPHILS RELATIVE PERCENT: 1 % (ref 1–4)
EPITHELIAL CELLS UA: ABNORMAL /HPF (ref 0–5)
GFR AFRICAN AMERICAN: 60 ML/MIN
GFR NON-AFRICAN AMERICAN: 49 ML/MIN
GFR SERPL CREATININE-BSD FRML MDRD: ABNORMAL ML/MIN/{1.73_M2}
GLUCOSE BLD-MCNC: 117 MG/DL (ref 70–99)
GLUCOSE URINE: NEGATIVE
HCT VFR BLD CALC: 34.6 % (ref 41–53)
HCT VFR BLD CALC: 36.6 % (ref 41–53)
HEMOGLOBIN: 11.5 G/DL (ref 13.5–17.5)
HEMOGLOBIN: 12 G/DL (ref 13.5–17.5)
KETONES, URINE: NEGATIVE
LACTIC ACID, SEPSIS: 1.4 MMOL/L (ref 0.5–1.9)
LEUKOCYTE ESTERASE, URINE: NEGATIVE
LYMPHOCYTES # BLD: 12 % (ref 24–44)
MCH RBC QN AUTO: 28.3 PG (ref 26–34)
MCH RBC QN AUTO: 28.7 PG (ref 26–34)
MCHC RBC AUTO-ENTMCNC: 32.8 G/DL (ref 31–37)
MCHC RBC AUTO-ENTMCNC: 33.2 G/DL (ref 31–37)
MCV RBC AUTO: 86.2 FL (ref 80–100)
MCV RBC AUTO: 86.7 FL (ref 80–100)
MONOCYTES # BLD: 16 % (ref 2–11)
MUCUS: ABNORMAL
NITRITE, URINE: NEGATIVE
OTHER OBSERVATIONS UA: ABNORMAL
PARTIAL THROMBOPLASTIN TIME: 26.8 SEC (ref 21.3–31.3)
PDW BLD-RTO: 14.4 % (ref 12.5–15.4)
PDW BLD-RTO: 14.6 % (ref 12.5–15.4)
PH UA: 5.5 (ref 5–8)
PLATELET # BLD: 276 K/UL (ref 140–450)
PLATELET # BLD: 281 K/UL (ref 140–450)
PMV BLD AUTO: 8 FL (ref 6–12)
PMV BLD AUTO: 8.2 FL (ref 6–12)
POTASSIUM SERPL-SCNC: 3.4 MMOL/L (ref 3.7–5.3)
PRO-BNP: 5024 PG/ML
PROTEIN UA: ABNORMAL
RBC # BLD: 3.99 M/UL (ref 4.5–5.9)
RBC # BLD: 4.25 M/UL (ref 4.5–5.9)
RBC UA: ABNORMAL /HPF (ref 0–2)
SARS-COV-2, RAPID: NOT DETECTED
SEG NEUTROPHILS: 70 % (ref 36–66)
SEGMENTED NEUTROPHILS ABSOLUTE COUNT: 5.7 K/UL (ref 1.8–7.7)
SODIUM BLD-SCNC: 136 MMOL/L (ref 135–144)
SPECIFIC GRAVITY UA: 1.02 (ref 1–1.03)
SPECIMEN DESCRIPTION: NORMAL
TROPONIN, HIGH SENSITIVITY: 108 NG/L (ref 0–22)
TROPONIN, HIGH SENSITIVITY: 127 NG/L (ref 0–22)
TROPONIN, HIGH SENSITIVITY: 86 NG/L (ref 0–22)
TURBIDITY: CLEAR
URINE HGB: ABNORMAL
UROBILINOGEN, URINE: NORMAL
WBC # BLD: 7.8 K/UL (ref 3.5–11)
WBC # BLD: 8.1 K/UL (ref 3.5–11)
WBC UA: ABNORMAL /HPF (ref 0–5)

## 2022-07-27 PROCEDURE — 85730 THROMBOPLASTIN TIME PARTIAL: CPT

## 2022-07-27 PROCEDURE — 83605 ASSAY OF LACTIC ACID: CPT

## 2022-07-27 PROCEDURE — 83880 ASSAY OF NATRIURETIC PEPTIDE: CPT

## 2022-07-27 PROCEDURE — 71045 X-RAY EXAM CHEST 1 VIEW: CPT

## 2022-07-27 PROCEDURE — 93005 ELECTROCARDIOGRAM TRACING: CPT | Performed by: EMERGENCY MEDICINE

## 2022-07-27 PROCEDURE — 6360000002 HC RX W HCPCS: Performed by: NURSE PRACTITIONER

## 2022-07-27 PROCEDURE — 80048 BASIC METABOLIC PNL TOTAL CA: CPT

## 2022-07-27 PROCEDURE — 84484 ASSAY OF TROPONIN QUANT: CPT

## 2022-07-27 PROCEDURE — 87635 SARS-COV-2 COVID-19 AMP PRB: CPT

## 2022-07-27 PROCEDURE — 2580000003 HC RX 258: Performed by: NURSE PRACTITIONER

## 2022-07-27 PROCEDURE — 36415 COLL VENOUS BLD VENIPUNCTURE: CPT

## 2022-07-27 PROCEDURE — 6370000000 HC RX 637 (ALT 250 FOR IP): Performed by: EMERGENCY MEDICINE

## 2022-07-27 PROCEDURE — 85025 COMPLETE CBC W/AUTO DIFF WBC: CPT

## 2022-07-27 PROCEDURE — 81001 URINALYSIS AUTO W/SCOPE: CPT

## 2022-07-27 PROCEDURE — 85027 COMPLETE CBC AUTOMATED: CPT

## 2022-07-27 PROCEDURE — 6370000000 HC RX 637 (ALT 250 FOR IP): Performed by: NURSE PRACTITIONER

## 2022-07-27 PROCEDURE — 99285 EMERGENCY DEPT VISIT HI MDM: CPT

## 2022-07-27 PROCEDURE — 2060000000 HC ICU INTERMEDIATE R&B

## 2022-07-27 RX ORDER — HEPARIN SODIUM 10000 [USP'U]/100ML
5-30 INJECTION, SOLUTION INTRAVENOUS CONTINUOUS
Status: DISCONTINUED | OUTPATIENT
Start: 2022-07-27 | End: 2022-07-28

## 2022-07-27 RX ORDER — HEPARIN SODIUM 1000 [USP'U]/ML
2000 INJECTION, SOLUTION INTRAVENOUS; SUBCUTANEOUS PRN
Status: DISCONTINUED | OUTPATIENT
Start: 2022-07-27 | End: 2022-07-28 | Stop reason: ALTCHOICE

## 2022-07-27 RX ORDER — SODIUM CHLORIDE 0.9 % (FLUSH) 0.9 %
5-40 SYRINGE (ML) INJECTION EVERY 12 HOURS SCHEDULED
Status: DISCONTINUED | OUTPATIENT
Start: 2022-07-27 | End: 2022-07-30 | Stop reason: HOSPADM

## 2022-07-27 RX ORDER — LISINOPRIL 5 MG/1
5 TABLET ORAL DAILY
Status: DISCONTINUED | OUTPATIENT
Start: 2022-07-28 | End: 2022-07-30 | Stop reason: HOSPADM

## 2022-07-27 RX ORDER — HEPARIN SODIUM 1000 [USP'U]/ML
4000 INJECTION, SOLUTION INTRAVENOUS; SUBCUTANEOUS ONCE
Status: COMPLETED | OUTPATIENT
Start: 2022-07-27 | End: 2022-07-27

## 2022-07-27 RX ORDER — ONDANSETRON 4 MG/1
4 TABLET, ORALLY DISINTEGRATING ORAL EVERY 8 HOURS PRN
Status: DISCONTINUED | OUTPATIENT
Start: 2022-07-27 | End: 2022-07-30 | Stop reason: HOSPADM

## 2022-07-27 RX ORDER — POTASSIUM CHLORIDE 20 MEQ/1
40 TABLET, EXTENDED RELEASE ORAL PRN
Status: DISCONTINUED | OUTPATIENT
Start: 2022-07-27 | End: 2022-07-30 | Stop reason: HOSPADM

## 2022-07-27 RX ORDER — ACETAMINOPHEN 325 MG/1
650 TABLET ORAL EVERY 6 HOURS PRN
Status: DISCONTINUED | OUTPATIENT
Start: 2022-07-27 | End: 2022-07-30 | Stop reason: HOSPADM

## 2022-07-27 RX ORDER — ASPIRIN 81 MG/1
81 TABLET, CHEWABLE ORAL DAILY
Status: DISCONTINUED | OUTPATIENT
Start: 2022-07-28 | End: 2022-07-30 | Stop reason: HOSPADM

## 2022-07-27 RX ORDER — SODIUM CHLORIDE 9 MG/ML
INJECTION, SOLUTION INTRAVENOUS PRN
Status: DISCONTINUED | OUTPATIENT
Start: 2022-07-27 | End: 2022-07-30 | Stop reason: HOSPADM

## 2022-07-27 RX ORDER — POTASSIUM CHLORIDE 7.45 MG/ML
10 INJECTION INTRAVENOUS PRN
Status: DISCONTINUED | OUTPATIENT
Start: 2022-07-27 | End: 2022-07-30 | Stop reason: HOSPADM

## 2022-07-27 RX ORDER — METOPROLOL SUCCINATE 25 MG/1
25 TABLET, EXTENDED RELEASE ORAL DAILY
Status: DISCONTINUED | OUTPATIENT
Start: 2022-07-28 | End: 2022-07-30 | Stop reason: HOSPADM

## 2022-07-27 RX ORDER — ATORVASTATIN CALCIUM 40 MG/1
80 TABLET, FILM COATED ORAL NIGHTLY
Status: DISCONTINUED | OUTPATIENT
Start: 2022-07-27 | End: 2022-07-30 | Stop reason: HOSPADM

## 2022-07-27 RX ORDER — SODIUM CHLORIDE 0.9 % (FLUSH) 0.9 %
10 SYRINGE (ML) INJECTION PRN
Status: DISCONTINUED | OUTPATIENT
Start: 2022-07-27 | End: 2022-07-30 | Stop reason: HOSPADM

## 2022-07-27 RX ORDER — ONDANSETRON 2 MG/ML
4 INJECTION INTRAMUSCULAR; INTRAVENOUS EVERY 6 HOURS PRN
Status: DISCONTINUED | OUTPATIENT
Start: 2022-07-27 | End: 2022-07-30 | Stop reason: HOSPADM

## 2022-07-27 RX ORDER — MAGNESIUM SULFATE 1 G/100ML
1000 INJECTION INTRAVENOUS PRN
Status: DISCONTINUED | OUTPATIENT
Start: 2022-07-27 | End: 2022-07-30 | Stop reason: HOSPADM

## 2022-07-27 RX ORDER — ASPIRIN 81 MG/1
324 TABLET, CHEWABLE ORAL ONCE
Status: COMPLETED | OUTPATIENT
Start: 2022-07-27 | End: 2022-07-27

## 2022-07-27 RX ORDER — ACETAMINOPHEN 650 MG/1
650 SUPPOSITORY RECTAL EVERY 6 HOURS PRN
Status: DISCONTINUED | OUTPATIENT
Start: 2022-07-27 | End: 2022-07-30 | Stop reason: HOSPADM

## 2022-07-27 RX ORDER — HEPARIN SODIUM 1000 [USP'U]/ML
4000 INJECTION, SOLUTION INTRAVENOUS; SUBCUTANEOUS PRN
Status: DISCONTINUED | OUTPATIENT
Start: 2022-07-27 | End: 2022-07-28 | Stop reason: ALTCHOICE

## 2022-07-27 RX ORDER — NITROGLYCERIN 0.4 MG/1
0.4 TABLET SUBLINGUAL EVERY 5 MIN PRN
Status: DISCONTINUED | OUTPATIENT
Start: 2022-07-27 | End: 2022-07-30 | Stop reason: HOSPADM

## 2022-07-27 RX ADMIN — HEPARIN SODIUM AND DEXTROSE 12 UNITS/KG/HR: 10000; 5 INJECTION INTRAVENOUS at 20:56

## 2022-07-27 RX ADMIN — ATORVASTATIN CALCIUM 80 MG: 40 TABLET, FILM COATED ORAL at 22:58

## 2022-07-27 RX ADMIN — ASPIRIN 81 MG CHEWABLE TABLET 324 MG: 81 TABLET CHEWABLE at 12:18

## 2022-07-27 RX ADMIN — HEPARIN SODIUM 4000 UNITS: 1000 INJECTION INTRAVENOUS; SUBCUTANEOUS at 20:54

## 2022-07-27 RX ADMIN — SODIUM CHLORIDE, PRESERVATIVE FREE 10 ML: 5 INJECTION INTRAVENOUS at 22:58

## 2022-07-27 ASSESSMENT — PAIN DESCRIPTION - PAIN TYPE: TYPE: ACUTE PAIN

## 2022-07-27 ASSESSMENT — PAIN SCALES - GENERAL: PAINLEVEL_OUTOF10: 1

## 2022-07-27 ASSESSMENT — PAIN DESCRIPTION - LOCATION: LOCATION: CHEST

## 2022-07-27 ASSESSMENT — PAIN DESCRIPTION - DESCRIPTORS: DESCRIPTORS: ACHING;DISCOMFORT

## 2022-07-27 NOTE — ED NOTES
Patients son, Morelia Rhodes, at bedside.  Asked if we could update him with status of his father at his phone 3 listed     Marie Ness RN  07/27/22 9989

## 2022-07-27 NOTE — ED NOTES
[facility] called for change of admission to [facility].  Need hospitalist.     Shagufta Johnson RN  07/27/22 4235

## 2022-07-27 NOTE — ED PROVIDER NOTES
[id number] [facility] ED  [address] [facility] [address]  Phone: [phone]  Fax: Jaylene Browne 1908      Pt Name: You Hernandez  MRN: [id number]  [place] 1945  Date of evaluation: 7/27/2022    CHIEF COMPLAINT       Chief Complaint   Patient presents with    Fatigue     Discharged from here last week. Has been weak since. Shortness of Breath       HISTORY OF PRESENT ILLNESS    You Hernandez is a 68 y.o. male who presents to the emergency department planing of generalized fatigue/weakness. Patient admitted to the hospital last week for dehydration. He presents by ambulance lives alone. Today he said he did not have the strength/energy to get out of bed. He has not been eating as much as he should be. He denies any chest pain he does admit to chronic shortness of breath. No fevers or chills. Chronic leg swelling. No other symptoms. REVIEW OF SYSTEMS       Constitutional: No fevers or chills positive generalized fatigue  HEENT: No sore throat, rhinorrhea, or earache   Eyes: No blurry vision or double vision no drainage   Cardiovascular: No chest pain or tachycardia   Respiratory: No wheezing chronic shortness of breath no cough  Gastrointestinal: No nausea, vomiting, diarrhea, constipation, or abdominal pain   : No hematuria or dysuria   Musculoskeletal: Chronic lower extremity edema no pain  Skin: No rash   Neurological: No focal neurologic complaints, paresthesias, weakness, or headache     PAST MEDICAL HISTORY    has a past medical history of Hyperlipidemia, Hypertension, MI (myocardial infarction) ([address] MS (multiple sclerosis) ([facility] and Pacemaker, artificial.    SURGICAL HISTORY      has a past surgical history that includes pacemaker placement and Coronary angioplasty with stent.     CURRENT MEDICATIONS       Previous Medications    ACETAMINOPHEN (TYLENOL) 325 MG TABLET    Take 650 mg by mouth every 6 hours as needed for Pain    ALBUTEROL SULFATE HFA (VENTOLIN HFA) 108 (90 BASE) MCG/ACT INHALER    Inhale 2 puffs into the lungs every 6 hours as needed for Wheezing    APIXABAN (ELIQUIS) 5 MG TABS TABLET    Take by mouth 2 times daily    CHLORHEXIDINE (HIBICLENS) 4 % EXTERNAL LIQUID    Apply topically daily as needed Apply topically daily as needed. FUROSEMIDE (LASIX) 40 MG TABLET    Take 1 tablet by mouth in the morning. IBUPROFEN (IBU) 600 MG TABLET    Take 1 tablet by mouth every 6 hours as needed for Pain    LISINOPRIL (PRINIVIL;ZESTRIL) 2.5 MG TABLET    Take 1 tablet by mouth in the morning. METOPROLOL SUCCINATE (TOPROL XL) 25 MG EXTENDED RELEASE TABLET    Take 0.5 tablets by mouth in the morning. NITROGLYCERIN (NITROSTAT) 0.4 MG SL TABLET    Place 0.4 mg under the tongue every 5 minutes as needed for Chest pain up to max of 3 total doses. If no relief after 1 dose, call 911. OMEPRAZOLE (PRILOSEC) 20 MG DELAYED RELEASE CAPSULE    Take 20 mg by mouth Daily    ROSUVASTATIN CALCIUM 20 MG CPSP    Take by mouth Daily       ALLERGIES     is allergic to codeine, doxycycline, erythromycin, gammagard [immune globulin], and sulfa antibiotics. FAMILY HISTORY     has no family status information on file. family history is not on file. SOCIAL HISTORY      reports that he has quit smoking. He has never used smokeless tobacco. He reports that he does not drink alcohol and does not use drugs.     PHYSICAL EXAM       ED Triage Vitals [07/27/22 1052]   BP Temp Temp Source Heart Rate Resp SpO2 Height Weight   (!) 151/72 98.6 °F (37 °C) Oral 72 18 95 % 5' 9\" (1.753 m) 172 lb (78 kg)       Constitutional: Alert, oriented x3, nontoxic, answering questions appropriately, acting properly for age, in no acute distress   HEENT: Extraocular muscles intact  Neck: Trachea midline   Cardiovascular: Regular rhythm and rate no murmurs   Respiratory: Diminished to auscultation bilaterally no wheezes, rhonchi, rales, no respiratory distress no tachypnea no retractions no hypoxia  Gastrointestinal: Soft, nontender, nondistended, positive bowel sounds. No rebound, rigidity, or guarding. No abdominal bruit no pulsatile mass  Musculoskeletal: Bilateral lower extremity edema no calf tenderness no asymmetry  Neurologic: Moving all 4 extremities  Skin: Warm and dry       DIFFERENTIAL DIAGNOSIS/ MDM:     IV labs. EKG. May require nursing home placement temporarily. DIAGNOSTIC RESULTS     EKG: All EKG's are interpreted by the Emergency Department Physician who either signs or Co-signs this chart in the absence of a cardiologist.    1110 paced rhythm rate 81 ID undetectable     No change compared to 7/21/2022    1259 paced rhythm rate 78 ID undetectable  QT 46 no ST elevations. Not indicated unless otherwise documented above    LABS:  Results for orders placed or performed during the hospital encounter of 07/27/22   COVID-19, Rapid    Specimen: Nasopharyngeal Swab   Result Value Ref Range    Specimen Description . NASOPHARYNGEAL SWAB     SARS-CoV-2, Rapid Not Detected Not Detected   CBC with Auto Differential   Result Value Ref Range    WBC 8.1 3.5 - 11.0 k/uL    RBC 4.25 (L) 4.5 - 5.9 m/uL    Hemoglobin 12.0 (L) 13.5 - 17.5 g/dL    Hematocrit 36.6 (L) 41 - 53 %    MCV 86.2 80 - 100 fL    MCH 28.3 26 - 34 pg    MCHC 32.8 31 - 37 g/dL    RDW 14.6 12.5 - 15.4 %    Platelets 258 419 - 237 k/uL    MPV 8.0 6.0 - 12.0 fL    Seg Neutrophils 70 (H) 36 - 66 %    Lymphocytes 12 (L) 24 - 44 %    Monocytes 16 (H) 2 - 11 %    Eosinophils % 1 1 - 4 %    Basophils 1 0 - 2 %    Segs Absolute 5.70 1.8 - 7.7 k/uL    Absolute Lymph # 1.00 1.0 - 4.8 k/uL    Absolute Mono # 1.30 (H) 0.1 - 1.2 k/uL    Absolute Eos # 0.10 0.0 - 0.4 k/uL    Basophils Absolute 0.10 0.0 - 0.2 k/uL   Basic Metabolic Panel   Result Value Ref Range    Glucose 117 (H) 70 - 99 mg/dL    BUN 22 8 - 23 mg/dL    Creatinine 1.40 (H) 0.70 - 1.20 mg/dL    Calcium 9.1 8.6 - 10.4 mg/dL    Sodium 136 135 - 144 mmol/L    Potassium 3.4 (L) 3.7 - 5.3 mmol/L    Chloride 99 98 - 107 mmol/L    CO2 25 20 - 31 mmol/L    Anion Gap 12 9 - 17 mmol/L    GFR Non-African American 49 (L) >60 mL/min    GFR African American 60 (L) >60 mL/min    GFR Comment         Troponin   Result Value Ref Range    Troponin, High Sensitivity 127 (HH) 0 - 22 ng/L   Brain Natriuretic Peptide   Result Value Ref Range    Pro-BNP 5,024 (H) <300 pg/mL   Lactate, Sepsis   Result Value Ref Range    Lactic Acid, Sepsis 1.4 0.5 - 1.9 mmol/L   Troponin   Result Value Ref Range    Troponin, High Sensitivity 108 (HH) 0 - 22 ng/L   EKG 12 Lead   Result Value Ref Range    Ventricular Rate 81 BPM    Atrial Rate 300 BPM    QRS Duration 174 ms    Q-T Interval 468 ms    QTc Calculation (Bazett) 543 ms    P Axis 73 degrees    R Axis -99 degrees    T Axis 30 degrees   EKG 12 Lead   Result Value Ref Range    Ventricular Rate 78 BPM    Atrial Rate 312 BPM    QRS Duration 180 ms    Q-T Interval 486 ms    QTc Calculation (Bazett) 554 ms    R Axis -99 degrees    T Axis 14 degrees       Not indicated unless otherwise documented above    RADIOLOGY:   I reviewed the radiologist interpretations:    XR CHEST PORTABLE   Final Result   No acute process. Stable cardiomegaly             Not indicated unless otherwise documented above    EMERGENCY DEPARTMENT COURSE:     The patient was given the following medications:  Orders Placed This Encounter   Medications    aspirin chewable tablet 324 mg        Vitals:   -------------------------  /73   Pulse 82   Temp 98.6 °F (37 °C) (Oral)   Resp 18   Ht 5' 9\" (1.753 m)   Wt 78 kg (172 lb)   SpO2 96%   BMI 25.40 kg/m²       11:55 AM patient resting comfortably no acute distress denies chest pain. Elevated troponin of 127 and BNP also elevated at 5000 both higher than what they were when he was seen here last week. Chest x-ray no infiltrate.   Patient will require admission/transfer would like you to the [facility].  Currently anticoagulated on Eliquis. 2:50 PM no beds available at the [place]. Patient agreeable to [facility] transfer. Discussed with Avelina Cameron agreeable to admit under the hospitalist.  Repeat troponin improved. Awaiting bed assignment and transport. CRITICAL CARE:    None    CONSULTS:    None    PROCEDURES:    None      OARRS Report if indicated             FINAL IMPRESSION      1. NSTEMI (non-ST elevated myocardial infarction) [facility])          DISPOSITION/PLAN   DISPOSITION Decision To Transfer 07/27/2022 11:59:08 AM        CONDITION ON DISPOSITION: STABLE       PATIENT REFERRED TO:  No follow-up provider specified.     DISCHARGE MEDICATIONS:  New Prescriptions    No medications on file       (Please note that portions of this note were completed with a voice recognition program.  Efforts were made to edit the dictations but occasionally words are mis-transcribed.)    Coleman Marshall DO   Attending Emergency Physician       Coleman Marshall DO  07/27/22 5244

## 2022-07-27 NOTE — ED PROVIDER NOTES
[facility]  [address] [address]  Phone: [phone]        ADDENDUM:      Care of this patient was assumed from Dr. Wilfrido Vaughn. The patient was seen for Fatigue (Discharged from here last week. Has been weak since.) and Shortness of Breath  . The patient's initial evaluation and plan have been discussed with the prior provider who initially evaluated the patient. Nursing Notes, Past Medical Hx, Past Surgical Hx, Allergies, were all reviewed. PAST MEDICAL HISTORY    has a past medical history of [facility] fl[facility] Hyperlipidemia, Hypertension, MI (myocardial infarction) ([facility] MS (multiple sclerosis) ([facility] and Pacemaker, artificial.    SURGICAL HISTORY      has a past surgical history that includes pacemaker placement and Coronary angioplasty with stent. CURRENT MEDICATIONS       Current Discharge Medication List        CONTINUE these medications which have NOT CHANGED    Details   furosemide (LASIX) 40 MG tablet Take 1 tablet by mouth in the morning. Qty: 30 tablet, Refills: 0      lisinopril (PRINIVIL;ZESTRIL) 2.5 MG tablet Take 1 tablet by mouth in the morning. Qty: 30 tablet, Refills: 3      metoprolol succinate (TOPROL XL) 25 MG extended release tablet Take 0.5 tablets by mouth in the morning. Qty: 30 tablet, Refills: 3      apixaban (ELIQUIS) 5 MG TABS tablet Take by mouth 2 times daily      albuterol sulfate HFA (PROVENTIL;VENTOLIN;PROAIR) 108 (90 Base) MCG/ACT inhaler Inhale 2 puffs into the lungs every 6 hours as needed for Wheezing      Rosuvastatin Calcium 20 MG CPSP Take by mouth Daily      nitroGLYCERIN (NITROSTAT) 0.4 MG SL tablet Place 0.4 mg under the tongue every 5 minutes as needed for Chest pain up to max of 3 total doses. If no relief after 1 dose, call 911. chlorhexidine (HIBICLENS) 4 % external liquid Apply topically daily as needed Apply topically daily as needed.       acetaminophen (TYLENOL) 325 MG tablet Take 650 mg by mouth every 6 hours as needed for Pain      omeprazole (PRILOSEC) 20 MG delayed release capsule Take 20 mg by mouth Daily      ibuprofen (IBU) 600 MG tablet Take 1 tablet by mouth every 6 hours as needed for Pain  Qty: 20 tablet, Refills: 0             ALLERGIES     is allergic to codeine, doxycycline, erythromycin, gammagard [immune globulin], and sulfa antibiotics. Diagnostic Results     LABS:   Results for orders placed or performed during the hospital encounter of 07/27/22   COVID-19, Rapid    Specimen: Nasopharyngeal Swab   Result Value Ref Range    Specimen Description . NASOPHARYNGEAL SWAB     SARS-CoV-2, Rapid Not Detected Not Detected   CBC with Auto Differential   Result Value Ref Range    WBC 8.1 3.5 - 11.0 k/uL    RBC 4.25 (L) 4.5 - 5.9 m/uL    Hemoglobin 12.0 (L) 13.5 - 17.5 g/dL    Hematocrit 36.6 (L) 41 - 53 %    MCV 86.2 80 - 100 fL    MCH 28.3 26 - 34 pg    MCHC 32.8 31 - 37 g/dL    RDW 14.6 12.5 - 15.4 %    Platelets 063 901 - 269 k/uL    MPV 8.0 6.0 - 12.0 fL    Seg Neutrophils 70 (H) 36 - 66 %    Lymphocytes 12 (L) 24 - 44 %    Monocytes 16 (H) 2 - 11 %    Eosinophils % 1 1 - 4 %    Basophils 1 0 - 2 %    Segs Absolute 5.70 1.8 - 7.7 k/uL    Absolute Lymph # 1.00 1.0 - 4.8 k/uL    Absolute Mono # 1.30 (H) 0.1 - 1.2 k/uL    Absolute Eos # 0.10 0.0 - 0.4 k/uL    Basophils Absolute 0.10 0.0 - 0.2 k/uL   Basic Metabolic Panel   Result Value Ref Range    Glucose 117 (H) 70 - 99 mg/dL    BUN 22 8 - 23 mg/dL    Creatinine 1.40 (H) 0.70 - 1.20 mg/dL    Calcium 9.1 8.6 - 10.4 mg/dL    Sodium 136 135 - 144 mmol/L    Potassium 3.4 (L) 3.7 - 5.3 mmol/L    Chloride 99 98 - 107 mmol/L    CO2 25 20 - 31 mmol/L    Anion Gap 12 9 - 17 mmol/L    GFR Non-African American 49 (L) >60 mL/min    GFR African American 60 (L) >60 mL/min    GFR Comment         Troponin   Result Value Ref Range    Troponin, High Sensitivity 127 (HH) 0 - 22 ng/L   Brain Natriuretic Peptide   Result Value Ref Range    Pro-BNP 5,024 (H) <300 pg/mL   Lactate, Sepsis   Result Value Ref Range    Lactic Acid, Sepsis 1.4 0.5 - 1.9 mmol/L   Troponin   Result Value Ref Range    Troponin, High Sensitivity 108 (HH) 0 - 22 ng/L   Urinalysis with Reflex to Culture    Specimen: Urine, clean catch   Result Value Ref Range    Color, UA Yellow Yellow    Turbidity UA Clear Clear    Glucose, Ur NEGATIVE NEGATIVE    Bilirubin Urine NEGATIVE NEGATIVE    Ketones, Urine NEGATIVE NEGATIVE    Specific Gravity, UA 1.025 1.005 - 1.030    Urine Hgb TRACE (A) NEGATIVE    pH, UA 5.5 5.0 - 8.0    Protein, UA 1+ (A) NEGATIVE    Urobilinogen, Urine Normal Normal    Nitrite, Urine NEGATIVE NEGATIVE    Leukocyte Esterase, Urine NEGATIVE NEGATIVE   Microscopic Urinalysis   Result Value Ref Range    -          WBC, UA 2 TO 5 0 - 5 /HPF    RBC, UA 2 TO 5 0 - 2 /HPF    Casts UA 10 TO 20 /LPF    Casts UA FINE GRANULAR /LPF    Casts UA 2 TO 5 /LPF    Casts UA HYALINE /LPF    Epithelial Cells UA 2 TO 5 0 - 5 /HPF    Bacteria, UA FEW (A) None    Mucus, UA 1+ (A) None    Amorphous, UA 1+ (A) None    Other Observations UA (A) NOT REQ. Utilizing a urinalysis as the only screening method to exclude a potential uropathogen can be unreliable in many patient populations. Rapid screening tests are less sensitive than culture and if UTI is a clinical possibility, culture should be considered despite a negative urinalysis.      APTT   Result Value Ref Range    PTT 26.8 21.3 - 31.3 sec   Troponin   Result Value Ref Range    Troponin, High Sensitivity 86 (HH) 0 - 22 ng/L   CBC   Result Value Ref Range    WBC 7.8 3.5 - 11.0 k/uL    RBC 3.99 (L) 4.5 - 5.9 m/uL    Hemoglobin 11.5 (L) 13.5 - 17.5 g/dL    Hematocrit 34.6 (L) 41 - 53 %    MCV 86.7 80 - 100 fL    MCH 28.7 26 - 34 pg    MCHC 33.2 31 - 37 g/dL    RDW 14.4 12.5 - 15.4 %    Platelets 792 410 - 892 k/uL    MPV 8.2 6.0 - 12.0 fL   EKG 12 Lead   Result Value Ref Range    Ventricular Rate 81 BPM    Atrial Rate 300 BPM    QRS Duration 174 ms    Q-T Interval 468 ms    QTc Calculation (Bazett) 543 ms    P Axis 73 degrees    R Axis -99 degrees    T Axis 30 degrees   EKG 12 Lead   Result Value Ref Range    Ventricular Rate 78 BPM    Atrial Rate 312 BPM    QRS Duration 180 ms    Q-T Interval 486 ms    QTc Calculation (Bazett) 554 ms    R Axis -99 degrees    T Axis 14 degrees       RADIOLOGY:  XR CHEST PORTABLE   Final Result   No acute process. Stable cardiomegaly             RECENT VITALS:  BP: (!) 147/68, Temp: 98.6 °F (37 °C), Heart Rate: 69, Resp: 16     ED Course     The patient was given the following medications:  Orders Placed This Encounter   Medications    aspirin chewable tablet 324 mg    sodium chloride flush 0.9 % injection 5-40 mL    sodium chloride flush 0.9 % injection 10 mL    0.9 % sodium chloride infusion    OR Linked Order Group     potassium chloride (KLOR-CON M) extended release tablet 40 mEq     potassium bicarb-citric acid (EFFER-K) effervescent tablet 40 mEq     potassium chloride 10 mEq/100 mL IVPB (Peripheral Line)    magnesium sulfate 1000 mg in dextrose 5% 100 mL IVPB    OR Linked Order Group     ondansetron (ZOFRAN-ODT) disintegrating tablet 4 mg     ondansetron (ZOFRAN) injection 4 mg    OR Linked Order Group     acetaminophen (TYLENOL) tablet 650 mg     acetaminophen (TYLENOL) suppository 650 mg    magnesium hydroxide (MILK OF MAGNESIA) 400 MG/5ML suspension 30 mL    atorvastatin (LIPITOR) tablet 80 mg    nitroGLYCERIN (NITROSTAT) SL tablet 0.4 mg    aspirin chewable tablet 81 mg    metoprolol succinate (TOPROL XL) extended release tablet 25 mg    lisinopril (PRINIVIL;ZESTRIL) tablet 5 mg    heparin (porcine) injection 4,000 Units    heparin (porcine) injection 4,000 Units    heparin (porcine) injection 2,000 Units    heparin 25,000 units in dextrose 5% 250 mL (premix) infusion       Medical Decision Making           The patient is a 75-year-old male who presents for evaluation of generalized weakness and shortness of breath. He was recently seen in the emergency department and was admitted for chest pain with elevated troponins in the 30s. The patient underwent a echocardiogram which showed an ejection fraction of 25 to 30%. He has an AICD in place. His chest pain resolved with fluid resuscitation and troponins were thought to be minimally elevated consistent with demand ischemia due to hypotension. He was subsequently discharged and told to follow-up with the [place]. The patient returns today for generalized weakness. His troponins have significantly increased to 127 and 108. CBC is grossly unremarkable. BMP shows chronic kidney disease that is unchanged. BNP has increased from 1330 to 5024. Lactic acid is normal.  Chest x-ray shows no acute process. COVID is negative. Initial plans were to transfer the patient to the [place] but they do not have any beds. The patient was subsequently accepted for transfer at [place] [address]. At time of signout we are awaiting transport. We are updated the transfer will not be available till tomorrow. I discussed the case with the house supervisor and with the [employer]YLIE, Liliane Cabrera, at 8:05 PM.  We have beds available at [facility] and Liliane Cabrera NP is okay with the patient staying at [facility] as long as cardiology gives permission for this. I spoke with Dr. Yeni Hatfield with cardiology at 8:22 PM and he recommends having [place] [address] interrogate the patient's AICD tomorrow, holding the patient's Eliquis, and starting him on heparin. He is comfortable with the patient staying at [facility] and states that if he needs to be transferred to [place] [address] for cardiac cath they can transport him and bring him back.   I updated Liliane Cabrera NP at 8:31 PM.    Disposition     FINAL IMPRESSION      1. NSTEMI (non-ST elevated myocardial infarction) [facility])          DISPOSITION/PLAN   DISPOSITION Admitted 07/27/2022 09:43:52 PM      CONDITION ON DISPOSITION:   Stable          (Please note that portions of this note were completed with a voice recognition program.  Efforts were made to edit the dictations but occasionally words are mis-transcribed.)    Jaki Handy DO  Emergency Medicine Physician                  Jaki Handy DO  07/28/22 0008

## 2022-07-27 NOTE — Clinical Note
Discharge Plan[de-identified] Other/Uknown (Specify)   Bed request comments: [facility] albarran, INP
Pt sent in from the [facility] for cellulitis to right arm and upper right thigh. As per facility, symptoms just started this morning. Pt's arm is swollen, red and wheeping.

## 2022-07-27 NOTE — ED NOTES
[facility] called by our staff and have scheduled the first ALS pickup for 1230 tonight. [facility] is also working on earlier transport now.      Jaelyn Garica RN  07/27/22 8359

## 2022-07-27 NOTE — ED NOTES
[facility] called back with room assignment.  Room 434 with Report number of [address] [place] [address]  07/27/22 6148

## 2022-07-27 NOTE — ED NOTES
[facility] called for transfer to [facility] in [place].   Dr Shabnam Rivera wants to consult with hospitalist.     Belinda Hall RN  07/27/22 6747

## 2022-07-27 NOTE — ED NOTES
Patient to the ER with c/c of feeling weakness for last week now. Patient was admitted to hospital last Thursday and d/c'd home. Unable to care for himself due to weakness, and lives alone. Patient is a/o x 3, patent airway, speaking clearly in complete sentences. Patient denies any CP or dyspnea. Lungs are noted to be wheezes but equal bilaterally to auscultation, HT are S/R, A=R. Abdomen is soft, non-tender. No NVD in last 24 hours. Normal Bowel and Bladder habits. Patient has strong PMS x 4.  Bilateral peripheral edema is appreciated from knees down. Patient arrived via [facility] to room 6 today. No falls today, just weak.      Ariel Mar RN  07/27/22 1548

## 2022-07-27 NOTE — ED NOTES
Dr Kin Martinez from [facility] called back to discuss case with Dr. Cassie Lewis.      Lexis Gore RN  07/27/22 7214

## 2022-07-27 NOTE — ED NOTES
Attempted to call [facility] for update on bed status and cleaning. Placed on hold for over 10 minutes.       Miguel Pisano RN  07/27/22 1356

## 2022-07-28 LAB
ALBUMIN SERPL-MCNC: 3.2 G/DL (ref 3.5–5.2)
ALBUMIN/GLOBULIN RATIO: 1 (ref 1–2.5)
ALP BLD-CCNC: 76 U/L (ref 40–129)
ALT SERPL-CCNC: 19 U/L (ref 5–41)
ANION GAP SERPL CALCULATED.3IONS-SCNC: 12 MMOL/L (ref 9–17)
AST SERPL-CCNC: 15 U/L
BILIRUB SERPL-MCNC: 0.5 MG/DL (ref 0.3–1.2)
BUN BLDV-MCNC: 20 MG/DL (ref 8–23)
CALCIUM SERPL-MCNC: 8.9 MG/DL (ref 8.6–10.4)
CHLORIDE BLD-SCNC: 99 MMOL/L (ref 98–107)
CHOLESTEROL/HDL RATIO: 3.9
CHOLESTEROL: 130 MG/DL
CO2: 22 MMOL/L (ref 20–31)
CREAT SERPL-MCNC: 1.28 MG/DL (ref 0.7–1.2)
EKG ATRIAL RATE: 300 BPM
EKG ATRIAL RATE: 300 BPM
EKG ATRIAL RATE: 312 BPM
EKG P AXIS: 73 DEGREES
EKG Q-T INTERVAL: 468 MS
EKG Q-T INTERVAL: 478 MS
EKG Q-T INTERVAL: 486 MS
EKG QRS DURATION: 168 MS
EKG QRS DURATION: 174 MS
EKG QRS DURATION: 180 MS
EKG QTC CALCULATION (BAZETT): 526 MS
EKG QTC CALCULATION (BAZETT): 543 MS
EKG QTC CALCULATION (BAZETT): 554 MS
EKG R AXIS: -111 DEGREES
EKG R AXIS: -99 DEGREES
EKG R AXIS: -99 DEGREES
EKG T AXIS: 10 DEGREES
EKG T AXIS: 14 DEGREES
EKG T AXIS: 30 DEGREES
EKG VENTRICULAR RATE: 73 BPM
EKG VENTRICULAR RATE: 78 BPM
EKG VENTRICULAR RATE: 81 BPM
GFR AFRICAN AMERICAN: >60 ML/MIN
GFR NON-AFRICAN AMERICAN: 55 ML/MIN
GFR SERPL CREATININE-BSD FRML MDRD: ABNORMAL ML/MIN/{1.73_M2}
GLUCOSE BLD-MCNC: 117 MG/DL (ref 70–99)
HCT VFR BLD CALC: 36.7 % (ref 41–53)
HDLC SERPL-MCNC: 33 MG/DL
HEMOGLOBIN: 11.6 G/DL (ref 13.5–17.5)
LDL CHOLESTEROL: 78 MG/DL (ref 0–130)
MAGNESIUM: 1.9 MG/DL (ref 1.6–2.6)
MCH RBC QN AUTO: 28 PG (ref 26–34)
MCHC RBC AUTO-ENTMCNC: 31.6 G/DL (ref 31–37)
MCV RBC AUTO: 88.6 FL (ref 80–100)
PARTIAL THROMBOPLASTIN TIME: 49 SEC (ref 21.3–31.3)
PARTIAL THROMBOPLASTIN TIME: 55.5 SEC (ref 21.3–31.3)
PDW BLD-RTO: 14.2 % (ref 12.5–15.4)
PLATELET # BLD: 302 K/UL (ref 140–450)
PMV BLD AUTO: 10 FL (ref 8–14)
POTASSIUM SERPL-SCNC: 3.7 MMOL/L (ref 3.7–5.3)
PRO-BNP: 4626 PG/ML
RBC # BLD: 4.14 M/UL (ref 4.5–5.9)
REASON FOR REJECTION: NORMAL
SODIUM BLD-SCNC: 133 MMOL/L (ref 135–144)
TOTAL PROTEIN: 6.4 G/DL (ref 6.4–8.3)
TRIGL SERPL-MCNC: 94 MG/DL
TROPONIN, HIGH SENSITIVITY: 87 NG/L (ref 0–22)
WBC # BLD: 8.6 K/UL (ref 3.5–11)
ZZ NTE CLEAN UP: ORDERED TEST: NORMAL
ZZ NTE WITH NAME CLEAN UP: SPECIMEN SOURCE: NORMAL

## 2022-07-28 PROCEDURE — 97167 OT EVAL HIGH COMPLEX 60 MIN: CPT

## 2022-07-28 PROCEDURE — 80061 LIPID PANEL: CPT

## 2022-07-28 PROCEDURE — 97162 PT EVAL MOD COMPLEX 30 MIN: CPT

## 2022-07-28 PROCEDURE — 97535 SELF CARE MNGMENT TRAINING: CPT

## 2022-07-28 PROCEDURE — 36415 COLL VENOUS BLD VENIPUNCTURE: CPT

## 2022-07-28 PROCEDURE — 83735 ASSAY OF MAGNESIUM: CPT

## 2022-07-28 PROCEDURE — 6370000000 HC RX 637 (ALT 250 FOR IP): Performed by: STUDENT IN AN ORGANIZED HEALTH CARE EDUCATION/TRAINING PROGRAM

## 2022-07-28 PROCEDURE — 6360000002 HC RX W HCPCS: Performed by: NURSE PRACTITIONER

## 2022-07-28 PROCEDURE — 51702 INSERT TEMP BLADDER CATH: CPT

## 2022-07-28 PROCEDURE — 6370000000 HC RX 637 (ALT 250 FOR IP): Performed by: NURSE PRACTITIONER

## 2022-07-28 PROCEDURE — 51798 US URINE CAPACITY MEASURE: CPT

## 2022-07-28 PROCEDURE — 2060000000 HC ICU INTERMEDIATE R&B

## 2022-07-28 PROCEDURE — 6360000002 HC RX W HCPCS: Performed by: STUDENT IN AN ORGANIZED HEALTH CARE EDUCATION/TRAINING PROGRAM

## 2022-07-28 PROCEDURE — 2700000000 HC OXYGEN THERAPY PER DAY

## 2022-07-28 PROCEDURE — 85730 THROMBOPLASTIN TIME PARTIAL: CPT

## 2022-07-28 PROCEDURE — 2580000003 HC RX 258: Performed by: NURSE PRACTITIONER

## 2022-07-28 PROCEDURE — 93005 ELECTROCARDIOGRAM TRACING: CPT | Performed by: NURSE PRACTITIONER

## 2022-07-28 PROCEDURE — 84484 ASSAY OF TROPONIN QUANT: CPT

## 2022-07-28 PROCEDURE — 99222 1ST HOSP IP/OBS MODERATE 55: CPT | Performed by: STUDENT IN AN ORGANIZED HEALTH CARE EDUCATION/TRAINING PROGRAM

## 2022-07-28 PROCEDURE — 6370000000 HC RX 637 (ALT 250 FOR IP): Performed by: UROLOGY

## 2022-07-28 PROCEDURE — 83880 ASSAY OF NATRIURETIC PEPTIDE: CPT

## 2022-07-28 PROCEDURE — 97530 THERAPEUTIC ACTIVITIES: CPT

## 2022-07-28 PROCEDURE — 85027 COMPLETE CBC AUTOMATED: CPT

## 2022-07-28 PROCEDURE — 80053 COMPREHEN METABOLIC PANEL: CPT

## 2022-07-28 RX ORDER — TAMSULOSIN HYDROCHLORIDE 0.4 MG/1
0.4 CAPSULE ORAL DAILY
Status: DISCONTINUED | OUTPATIENT
Start: 2022-07-28 | End: 2022-07-30 | Stop reason: HOSPADM

## 2022-07-28 RX ORDER — FUROSEMIDE 10 MG/ML
20 INJECTION INTRAMUSCULAR; INTRAVENOUS 2 TIMES DAILY
Status: DISCONTINUED | OUTPATIENT
Start: 2022-07-28 | End: 2022-07-30 | Stop reason: HOSPADM

## 2022-07-28 RX ADMIN — FUROSEMIDE 20 MG: 10 INJECTION, SOLUTION INTRAMUSCULAR; INTRAVENOUS at 17:28

## 2022-07-28 RX ADMIN — ACETAMINOPHEN 650 MG: 325 TABLET ORAL at 00:33

## 2022-07-28 RX ADMIN — FUROSEMIDE 20 MG: 10 INJECTION, SOLUTION INTRAMUSCULAR; INTRAVENOUS at 08:28

## 2022-07-28 RX ADMIN — ATORVASTATIN CALCIUM 80 MG: 40 TABLET, FILM COATED ORAL at 20:35

## 2022-07-28 RX ADMIN — APIXABAN 5 MG: 5 TABLET, FILM COATED ORAL at 16:49

## 2022-07-28 RX ADMIN — TAMSULOSIN HYDROCHLORIDE 0.4 MG: 0.4 CAPSULE ORAL at 15:55

## 2022-07-28 RX ADMIN — METOPROLOL SUCCINATE 25 MG: 25 TABLET, EXTENDED RELEASE ORAL at 08:27

## 2022-07-28 RX ADMIN — LISINOPRIL 5 MG: 5 TABLET ORAL at 08:27

## 2022-07-28 RX ADMIN — HEPARIN SODIUM AND DEXTROSE 14 UNITS/KG/HR: 10000; 5 INJECTION INTRAVENOUS at 08:27

## 2022-07-28 RX ADMIN — HEPARIN SODIUM 2000 UNITS: 1000 INJECTION INTRAVENOUS; SUBCUTANEOUS at 08:24

## 2022-07-28 RX ADMIN — SODIUM CHLORIDE, PRESERVATIVE FREE 10 ML: 5 INJECTION INTRAVENOUS at 20:35

## 2022-07-28 ASSESSMENT — PAIN DESCRIPTION - LOCATION
LOCATION: HAND
LOCATION: ABDOMEN

## 2022-07-28 ASSESSMENT — PAIN DESCRIPTION - DESCRIPTORS
DESCRIPTORS: ACHING;DISCOMFORT
DESCRIPTORS: PRESSURE

## 2022-07-28 ASSESSMENT — PAIN DESCRIPTION - ORIENTATION
ORIENTATION: RIGHT;LEFT
ORIENTATION: LOWER

## 2022-07-28 ASSESSMENT — PAIN SCALES - GENERAL
PAINLEVEL_OUTOF10: 3
PAINLEVEL_OUTOF10: 1

## 2022-07-28 ASSESSMENT — PAIN DESCRIPTION - PAIN TYPE: TYPE: ACUTE PAIN

## 2022-07-28 NOTE — PROGRESS NOTES
6796- NP notified of bladder scan results. Orders received to reassess at 0700. Will continue to monitor.

## 2022-07-28 NOTE — ACP (ADVANCE CARE PLANNING)
Discussed goals of care and code status with the patient at bedside. He stated that he is tired of being poked and is currently refusing aPTT draws. The patient stated that he would like to change his code status to [facility] and would like his heparin drip to stopped. The patient states that he would like to continue diuresis for his heart failure exacerbation but is not interested in pursuing any additional aggressive cardiac interventions. Will stop the heparin drip and resume the patient's home Eliquis at this time.      Mala Bishop MD   Hospitalist

## 2022-07-28 NOTE — PROGRESS NOTES
Pt relayed to nurse that he has a CPAP at home. His CPAP is not with him and is currently declining use of hospital machine. Pt states he wears his machine \"sometimes\". Pt requesting oxygen to sleep with so placed on 2lpm overnight for comfort. Continuous pulse ox in place.

## 2022-07-28 NOTE — ED NOTES
[employer] rep called back, updated on patient situation, states he will be in tomorrow to interrogate unless needed sooner.       Victoriano Jhaveri RN  07/27/22 8516

## 2022-07-28 NOTE — PLAN OF CARE
Problem: Discharge Planning  Goal: Discharge to home or other facility with appropriate resources  7/28/2022 1854 by Ying Cardona RN  Outcome: Progressing  7/28/2022 0515 by Miles King RN  Outcome: Progressing  Flowsheets (Taken 7/27/2022 2237)  Discharge to home or other facility with appropriate resources:   Identify barriers to discharge with patient and caregiver   Identify discharge learning needs (meds, wound care, etc)   Refer to discharge planning if patient needs post-hospital services based on physician order or complex needs related to functional status, cognitive ability or social support system   Arrange for needed discharge resources and transportation as appropriate   Arrange for interpreters to assist at discharge as needed     Problem: Skin/Tissue Integrity  Goal: Absence of new skin breakdown  Description: 1. Monitor for areas of redness and/or skin breakdown  2. Assess vascular access sites hourly  3. Every 4-6 hours minimum:  Change oxygen saturation probe site  4. Every 4-6 hours:  If on nasal continuous positive airway pressure, respiratory therapy assess nares and determine need for appliance change or resting period.   7/28/2022 1854 by Ying Cardona RN  Outcome: Progressing  7/28/2022 0515 by Miles King RN  Outcome: Progressing     Problem: ABCDS Injury Assessment  Goal: Absence of physical injury  7/28/2022 1854 by Ying Cardona RN  Outcome: Progressing  Flowsheets  Taken 7/28/2022 8788 by Ying Cardona RN  Absence of Physical Injury: Implement safety measures based on patient assessment  Taken 7/28/2022 0516 by Miles King RN  Absence of Physical Injury: Implement safety measures based on patient assessment  7/28/2022 0515 by Miles King RN  Outcome: Progressing     Problem: Safety - Adult  Goal: Free from fall injury  7/28/2022 1854 by Ying Cardona RN  Outcome: Progressing  Flowsheets  Taken 7/28/2022 0601 by Ying Cardona RN  Free From Fall Injury: Instruct family/caregiver on patient safety  Taken 7/28/2022 0516 by Erica Sommer RN  Free From Fall Injury: Instruct family/caregiver on patient safety  7/28/2022 0515 by Erica Sommer RN  Outcome: Progressing     Problem: Pain  Goal: Verbalizes/displays adequate comfort level or baseline comfort level  7/28/2022 1854 by Ashlie Roger RN  Outcome: Progressing  7/28/2022 0515 by Erica Sommer RN  Outcome: Progressing     Problem: Chronic Conditions and Co-morbidities  Goal: Patient's chronic conditions and co-morbidity symptoms are monitored and maintained or improved  Outcome: Progressing

## 2022-07-28 NOTE — PROGRESS NOTES
Physical Therapy  Facility/Department: [facility] SURG ICU  Physical Therapy Initial Assessment    Name: Peter Rudolph  : 1945  MRN: [id number]  Date of Service: 2022  Chief Complaint   Patient presents with    Fatigue     Discharged from here last week. Has been weak since. Shortness of Breath        Discharge Recommendations:  Patient would benefit from continued therapy after discharge   PT Equipment Recommendations  Equipment Needed: No  Other: Pt has all needed equipment at this time. Patient Diagnosis(es): The encounter diagnosis was NSTEMI (non-ST elevated myocardial infarction) ([facility] [address] Past Medical History:  has a past medical history of Atrial flutter ([facility] [address] Hyperlipidemia, Hypertension, MI (myocardial infarction) ([facility] [address] MS (multiple sclerosis) ([facility] [address] and Pacemaker, artificial.  Past Surgical History:  has a past surgical history that includes pacemaker placement and Coronary angioplasty with stent. Assessment   Body Structures, Functions, Activity Limitations Requiring Skilled Therapeutic Intervention: Decreased functional mobility ; Decreased safe awareness;Decreased balance  Assessment: Pt presents with generalized weakness with impaired mobility as compared to previous abilities. Pt requires Max assist x 2 and/or Arvin Corti for transfers at this time. Pt lives alone and is nonambulatory but was able to previously transfer Independently from his electric scooter. Pt will not be able to return to previous living arrangement at current status due to need for two assist or mechanical device. Pt will benefit from further therapy at discharge.   Treatment Diagnosis: generalized weakness and decline in mobility  Therapy Prognosis: Good  Decision Making: High Complexity  Requires PT Follow-Up: Yes  Activity Tolerance  Activity Tolerance: Patient limited by endurance;Treatment limited secondary to medical complications     Plan   Plan  Plan:  (5-6x/week)  Current Treatment Recommendations: Balance training, Functional mobility training, Transfer training, Patient/Caregiver education & training, Safety education & training, Therapeutic activities  Safety Devices  Type of Devices: Gait belt, Chair alarm in place, Left in chair, Nurse notified, Call light within reach  Restraints  Restraints Initially in Place: No     Restrictions  Restrictions/Precautions  Restrictions/Precautions: General Precautions  Required Braces or Orthoses?: No  Position Activity Restriction  Other position/activity restrictions: Pt uses an electric scooter at home and does not ambulate due to MS. Subjective   General  Patient assessed for rehabilitation services?: Yes  Family / Caregiver Present: No  Referring Practitioner: Mica Pruett  Referral Date : 07/28/22  Diagnosis: NSTEMI  Follows Commands: Within Functional Limits  Subjective  Subjective: Pt sitting upright in bed requesting to use the toilet. Pt agreeable to therapy. Pt denies pain at this time. Social/Functional History  Social/Functional History  Lives With: Alone  Type of Home: House  Home Layout: One level  Home Access: Ramped entrance  Bathroom Shower/Tub: Tub/Shower unit  Bathroom Toilet: Handicap height  Bathroom Equipment: Tub transfer bench, Grab bars around toilet  Bathroom Accessibility: [facility]: Electric scooter  Has the patient had two or more falls in the past year or any fall with injury in the past year?: No  Receives Help From: Family  ADL Assistance: [address]: Needs assistance (,door dash)  Homemaking Responsibilities: Yes  Transfer Assistance: Independent  Active : No  Patient's  Info: Romy Gomez [address]  Mode of Transportation: Friends, Bettye Beards  Occupation: Retired  Type of Occupation:  and   Additional Comments: Has a  that comes once a month and also occasionally transports to appointments.   Vision/Hearing  Vision  Vision: Assistance  Comment: Transfers: bed <-> WC, WC > toilet using squat/scoot pivot method; toilet transfer to recliner with Jovani Salas. Pt was Max assist x 2 for all transfers. Ambulation  Surface:  (Pt nonambulatory.)     Balance  Posture: Fair  Sitting - Static: Fair  Sitting - Dynamic: Fair;-  Standing - Static: Poor  Standing - Dynamic: Poor  Comments: Standing balance assessed without device but use of WC and handrails. OutComes Score                                                  AM-PAC Score  AM-PAC Inpatient Mobility Raw Score : 11 (07/28/22 1542)  AM-PAC Inpatient T-Scale Score : 33.86 (07/28/22 1542)  Mobility Inpatient CMS 0-100% Score: 72.57 (07/28/22 1542)  Mobility Inpatient CMS G-Code Modifier : CL (07/28/22 1542)          Tinneti Score       Goals  Short Term Goals  Time Frame for Short term goals: 14 visits  Short term goal 1: Pt to be Modified (I) with bed mobility. Short term goal 2: Pt to transfer with Modified (I) from [facility] to various surfaces with modifications as necessary. Short term goal 3: Pt to improve dynamic balance during transfers to [facility] to reduce risk for falls.   Patient Goals   Patient goals : Return home       Education  Patient Education  Education Given To: Patient  Education Provided: Role of Therapy;Plan of Care;Transfer Training  Education Provided Comments: transfer sequencing and safety  Education Method: Demonstration;Verbal  Barriers to Learning: None  Education Outcome: Continued education needed      Therapy Time   Individual Concurrent Group Co-treatment   Time In 1007         Time Out 1053         Minutes 46         Timed Code Treatment Minutes: [address], PT

## 2022-07-28 NOTE — CONSULTS
History    Not on file   Tobacco Use    Smoking status: Former    Smokeless tobacco: Never   Substance and Sexual Activity    Alcohol use: No    Drug use: No    Sexual activity: Not on file   Other Topics Concern    Not on file   Social History Narrative    Not on file     Social Determinants of Health     Financial Resource Strain: Not on file   Food Insecurity: Not on file   Transportation Needs: Not on file   Physical Activity: Not on file   Stress: Not on file   Social Connections: Not on file   Intimate Partner Violence: Not on file   Housing Stability: Not on file       Family History:  History reviewed. No pertinent family history.     REVIEW OF SYSTEMS:  Constitutional: negative  Eyes: negative  Respiratory: negative  Cardiovascular: negative  Gastrointestinal: negative  Genitourinary: see HPI  Musculoskeletal: negative  Skin: negative   Neurological: negative  Hematological/Lymphatic: negative  Psychological: negative        Physical Exam:      This a 68 y.o. female   Patient Vitals for the past 24 hrs:   BP Temp Temp src Pulse Resp SpO2 Weight   07/28/22 1315 -- -- -- -- -- -- 165 lb 12.6 oz (75.2 kg)   07/28/22 1308 115/64 99.3 °F (37.4 °C) Temporal 69 16 97 % --   07/28/22 0803 136/68 98.2 °F (36.8 °C) Temporal 70 16 95 % --   07/28/22 0540 -- -- -- -- -- -- 171 lb 15.3 oz (78 kg)   07/28/22 0354 136/76 97.9 °F (36.6 °C) Oral (!) 119 15 90 % --   07/28/22 0057 -- -- -- -- -- -- 171 lb 15.3 oz (78 kg)   07/27/22 2237 -- -- -- 72 -- -- --   07/27/22 2223 (!) 147/68 98.6 °F (37 °C) Axillary 69 16 98 % --   07/27/22 2101 (!) 145/72 -- -- 69 -- 93 % --   07/27/22 1930 128/84 -- -- 76 -- 96 % --   07/27/22 1900 133/85 -- -- 75 -- 95 % --   07/27/22 1830 (!) 141/79 -- -- 71 -- 94 % --   07/27/22 1815 (!) 147/73 -- -- 76 -- 96 % --   07/27/22 1800 136/80 -- -- 70 -- 95 % --   07/27/22 1745 (!) 153/72 -- -- 73 -- 96 % --   07/27/22 1700 137/77 -- -- 73 -- 95 % --   07/27/22 1600 (!) 148/72 -- -- 80 -- 97 % -- 07/27/22 1545 134/68 -- -- 76 -- 96 % --   07/27/22 1500 118/69 -- -- 75 -- 94 % --   07/27/22 1445 136/81 -- -- 82 -- 94 % --   07/27/22 1430 132/78 -- -- 77 -- 96 % --     Constitutional: Patient in no acute distress. Neuro: Alert and oriented to person, place and time. Psych: mood and affect normal  HEENT negative  Lungs: Respiratory effort is normal  Cardiovascular: Normal peripheral pulses. Regular rate  Abdomen: Soft, non-tender, non-distended with no CVA, flank pain or hepatosplenomegaly. No hernias. Kidneys normal.  Lymphatics: No palpable lymphadenopathy. Bladder non-tender and not distended. Pelvic exam: deferred  Rectal exam not indicated  Minimal/no edema in bilateral lower extremities. LABS:   Recent Labs     07/27/22  1116 07/27/22 2025 07/28/22  0128   WBC 8.1 7.8 8.6   HGB 12.0* 11.5* 11.6*   HCT 36.6* 34.6* 36.7*   MCV 86.2 86.7 88.6    276 302     Recent Labs     07/27/22  1116 07/28/22  0128    133*   K 3.4* 3.7   CL 99 99   CO2 25 22   BUN 22 20   CREATININE 1.40* 1.28*       Additional Lab/Culture results:     Urinalysis:   Recent Labs     07/27/22  1700   COLORU Yellow   PHUR 5.5   WBCUA 2 TO 5   RBCUA 2 TO 5   MUCUS 1+*   BACTERIA FEW*   SPECGRAV 1.025   LEUKOCYTESUR NEGATIVE   UROBILINOGEN Normal   BILIRUBINUR NEGATIVE        -----------------------------------------------------------------  Imaging Reviewed during this encounter:   Dana Wood MD independently reviewed the images and verified the radiology reports from:    XR CHEST PORTABLE    Result Date: 7/27/2022  EXAMINATION: ONE XRAY VIEW OF THE CHEST 7/27/2022 11:37 am COMPARISON: 07/21/2022 HISTORY: ORDERING SYSTEM PROVIDED HISTORY: shortness of breath TECHNOLOGIST PROVIDED HISTORY: shortness of breath Reason for Exam: shortness of breath Additional signs and symptoms: Pt c/o weakness and fatigue. FINDINGS: Transvenous pacer remains in place. The lungs are without acute focal process.   There is no effusion or pneumothorax. The cardiomediastinal silhouette is stable. The osseous structures are stable. No acute process.  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

## 2022-07-28 NOTE — CONSULTS
[facility]    Consult                        Today's Date: 7/28/2022  Patient Name: Rosa Deleon  Date of admission: 7/27/2022 10:56 AM  Patient's age: 68 y. o., 1945  Admission Dx: NSTEMI (non-ST elevated myocardial infarction) ([facility] [I21.4]    Reason for Consult:  Cardiac evaluation    Requesting Physician: Pamela Bliss MD    CHIEF COMPLAINT:  weakness/SOB    History Obtained From:  patient, electronic medical record, /care Everywhere    HISTORY OF PRESENT ILLNESS:      The patient is a 68 y.o.  male with a history of CKD stage 3 , CAD with RCA stenting in 2020, Afib , HDL, HTN, PAD, combined systolic/diastolic heart failure and AICD in place who is admitted to the hospital for fatigue and weakness. Patient was in the hospital a week ago was complaining of chest pain and found to be hypotensive and was discharged home. Presents to the hospital again with no chest pain but weakness , cardiology was consulted for unstable angina and elevation of trops 127/108/86/87  Patient seen and examined sitting in chair, denies chest pain. Patient stated that that he has a defibrillator since 2000 and was upgraded to a pacer 3 months ago at the [facility], proBNP up from 1330 last week to 5,024          Past Medical History:   has a past medical history of Atrial flutter ([facility] Hyperlipidemia, Hypertension, MI (myocardial infarction) ([facility] MS (multiple sclerosis) ([facility] and Pacemaker, artificial.    Past Surgical History:   has a past surgical history that includes pacemaker placement and Coronary angioplasty with stent. Home Medications:    Prior to Admission medications    Medication Sig Start Date End Date Taking?  Authorizing Provider   furosemide (LASIX) 40 MG tablet Take 1 tablet by mouth in the morning. 7/23/22 8/22/22  Clearnce Arabia, DO   lisinopril (PRINIVIL;ZESTRIL) 2.5 MG tablet Take 1 tablet by mouth in the morning. 7/23/22   Myrae Arabia, DO   metoprolol succinate (TOPROL XL) 25 MG extended release tablet Take 0.5 tablets by mouth in the morning. 7/23/22   [place] Luc,    apixaban (ELIQUIS) 5 MG TABS tablet Take by mouth 2 times daily    Historical Provider, MD   albuterol sulfate HFA (PROVENTIL;VENTOLIN;PROAIR) 108 (90 Base) MCG/ACT inhaler Inhale 2 puffs into the lungs every 6 hours as needed for Wheezing    Historical Provider, MD   Rosuvastatin Calcium 20 MG CPSP Take by mouth Daily    Historical Provider, MD   nitroGLYCERIN (NITROSTAT) 0.4 MG SL tablet Place 0.4 mg under the tongue every 5 minutes as needed for Chest pain up to max of 3 total doses. If no relief after 1 dose, call 911. Historical Provider, MD   chlorhexidine (HIBICLENS) 4 % external liquid Apply topically daily as needed Apply topically daily as needed. Historical Provider, MD   acetaminophen (TYLENOL) 325 MG tablet Take 650 mg by mouth every 6 hours as needed for Pain    Historical Provider, MD   omeprazole (PRILOSEC) 20 MG delayed release capsule Take 20 mg by mouth Daily    Historical Provider, MD   ibuprofen (IBU) 600 MG tablet Take 1 tablet by mouth every 6 hours as needed for Pain 6/25/22 7/2/22  Ave Olvera MD       Allergies:  Codeine, Doxycycline, Erythromycin, Gammagard [immune globulin], and Sulfa antibiotics    Social History:   reports that he has quit smoking. He has never used smokeless tobacco. He reports that he does not drink alcohol and does not use drugs. Family History: family history is not on file. No h/o sudden cardiac death. No for premature CAD    REVIEW OF SYSTEMS:    Constitutional: there has been no unanticipated weight loss. There's been No change in energy level, No change in activity level. Eyes: No visual changes or diplopia. No scleral icterus. ENT: No Headaches, hearing loss or vertigo. No mouth sores or sore throat. Cardiovascular: see above  Respiratory: see above  Gastrointestinal: No abdominal pain, appetite loss, blood in stools. Genitourinary: No dysuria, trouble voiding, or hematuria. Musculoskeletal:  No gait disturbance, No weakness or joint complaints. Integumentary: No rash or pruritis. Neurological: No headache or diplopia. No tingling  Psychiatric: No anxiety, or depression. Endocrine: No temperature intolerance. Hematologic/Lymphatic: No abnormal bruising or bleeding, blood clots or swollen lymph nodes. Allergic/Immunologic: No nasal congestion or hives. PHYSICAL EXAM:      /76   Pulse (!) 119   Temp 97.9 °F (36.6 °C) (Oral)   Resp 15   Ht 5' 9\" (1.753 m)   Wt 171 lb 15.3 oz (78 kg)   SpO2 90%   BMI 25.39 kg/m²    Constitutional and General Appearance: alert, cooperative, no distress and appears stated age  HEENT: PERRL, no cervical lymphadenopathy. No masses palpable. Normal oral mucosa  Respiratory:  Normal excursion and expansion without use of accessory muscles  Resp Auscultation: Good respiratory effort. No for increased work of breathing. On auscultation: clear to auscultation bilaterally  Cardiovascular:  Heart tones are crisp and normal. regular S1 and S2.  Jugular venous pulsation Normal  The carotid upstroke is normal in amplitude and contour without delay or bruit   Abdomen:   soft  Bowel sounds present  Extremities:   No edema  Neurological:  Alert and oriented. DATA:    Diagnostics:    EKG: Ventricular-paced rhythm  Abnormal ECG  When compared with ECG of 27-JUL-2022 12:59,  Vent. rate has decreased BY   5 BPM.  ECHO:     Summary  Technically difficult study. Left ventricle is normal in size and normal left ventricular wall thickness. Global left ventricular systolic function is severely reduced Estimated  ejection fraction is 25-30%. Right ventricle is normal in size with reduced function. Mild aortic insufficiency. Mild mitral regurgitation. Mild tricuspid regurgitation. Estimated right ventricular systolic pressure  is 72.85 mmHg.   Aortic root and ascending aorta are mildly dilated measuring 3.9 cm. IVC Increased diameter, but still has inspiratory variation suggesting upper  normal or mildly elevated RA filling pressure (i.e. CVP) . Signature  ----------------------------------------------------------------------------   Electronically signed by Zoe Davis(Sonographer) on 07/22/2022 08:38 AM  ----------------------------------------------------------------------------     ----------------------------------------------------------------------------   Electronically signed by Abdi Santo(Interpreting physician) on 07/22/2022   02:27 PM  ----------------------------------------------------------------------------. Stress Test: not obtained. Cardiac Angiography:   [facility]-  angioplasty of the RCA for in-stent restenosis in July 2020. Report unavailable     Labs:     CBC:   Recent Labs     07/27/22 2025 07/28/22 0128   WBC 7.8 8.6   HGB 11.5* 11.6*   HCT 34.6* 36.7*    302     BMP:   Recent Labs     07/27/22 1116 07/28/22 0128    133*   K 3.4* 3.7   CO2 25 22   BUN 22 20   CREATININE 1.40* 1.28*   LABGLOM 49* 55*   GLUCOSE 117* 117*     BNP: No results for input(s): BNP in the last 72 hours. PT/INR: No results for input(s): PROTIME, INR in the last 72 hours. APTT:  Recent Labs     07/27/22 2025 07/28/22  0128   APTT 26.8 55.5*     CARDIAC ENZYMES:No results for input(s): CKTOTAL, CKMB, CKMBINDEX, TROPONINI in the last 72 hours. FASTING LIPID PANEL:No results found for: HDL, LDLDIRECT, LDLCALC, TRIG  LIVER PROFILE:  Recent Labs     07/28/22  0128   AST 15   ALT 19   LABALBU 3.2*       IMPRESSION:    Acute/ chronic CHF   2. Elevation of trops 127/108/86/87 could be  type 1 versus type 2 secondary to CHF /CKD  3. Chronic ischemic cardiomyopathy with BIV AICD in place   4.  History of Afib - on AC at home       Patient Active Problem List   Diagnosis    Hypotension    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 ([facility] [address]    Coronary artery disease involving native coronary artery of native heart    Ischemic cardiomyopathy    Stage 3a chronic kidney disease (HCC)    Chronic atrial fibrillation (HCC)    NSTEMI (non-ST elevated myocardial infarction) ([facility]       RECOMMENDATIONS:  Cardiac stable-denies chest pain-continue statin,  beta-blocker-we will consider cardiac cath if patient develops chest pain  Echo reviewed as above -chronic ischemic cardiomyopathy with ejection fraction 25 to 30% and BIV AICD in place -clinically mild fluid overload-continue IV Lasix with close monitoring monitor renal function and strict I/0  Pacer interrogation  History of A. Fib-continue heparin drip and switch to oral AC on discharge  Keep K>4 , Mg>2      Discussed with patient and Nurse    SAVANNA Iniguez - NP    [facility]           [phone]     I am signing just for the purposes of management. Patient was discussed with me. Complex cardiac history. Appears to be in acute on chronic sys HF with worsening BNP. His Trop elevation is likely due to acute on chronic sys HF, and is downtrending. Also he denies any cp. Ligiae, then consider ischemia workup once more euvolemic     Abdi Santo DO, FACC, [address], [facility]  [email or web address]  [phone]

## 2022-07-28 NOTE — CARE COORDINATION
07/28/22 1159   Service Assessment   Patient Orientation Alert and Oriented   Cognition Alert   History Provided By Patient   Primary Caregiver Self   Support Systems Children;Family Members   Patient's Healthcare Decision Maker is: Legal Next of Kin   PCP Verified by CM Yes  ([facility])   Prior Functional Level Independent in ADLs/IADLs   Current Functional Level Independent in ADLs/IADLs   Can patient return to prior living arrangement Yes   Ability to make needs known: Good   Family able to assist with home care needs: Yes   Social/Functional History   Lives With Alone   Type of [address] One level   Home Access Ramped entrance   Bathroom Shower/Tub Tub/Shower unit   Bathroom Toilet Handicap height   Bathroom Equipment Tub transfer bench;Grab bars around toilet   [address] scooter   Harish   Homemaking Responsibilities Yes   Active  No   Patient's [address]   Mode of Transportation Friends;Van   Occupation Retired   Type of Occupation  and [address]   Discharge [address] Prior To Admission [address] Medications No   Meds-to-Beds: Does the patient want to have any new prescriptions delivered to bedside prior to discharge? No   Type of Home Care Services None   Patient expects to be discharged to: House   One/Two Story Residence One story   Services At/After Discharge   Mode of Transport at Discharge E[address] Follow Up Transport Family   Condition of Participation: Discharge Planning   The Patient and/or Patient Representative was provided with a Choice of Provider? Patient   The Patient and/Or Patient Representative agree with the Discharge Plan?  Yes   Freedom of Choice list was provided with basic dialogue that supports the patient's individualized plan of care/goals, treatment preferences, and shares the quality data associated with the providers?   Yes       Refusing SNF, wheelchair bound at baseline, may need assistance with ride home

## 2022-07-28 NOTE — PROGRESS NOTES
Patient upset regarding being \"poked for labs\" voiced interest in hospice and asked to speak with Dr Antoinette Baker informed Dr Antoinette Baker of request.

## 2022-07-28 NOTE — PROGRESS NOTES
Occupational Therapy  Facility/Department: [facility] [address] [place] [address] ICU  Occupational Therapy Initial Assessment      Name: Chanel Mcduffie  : 1945  MRN: [id number]  Date of Service: 2022    Chief Complaint   Patient presents with    Fatigue     Discharged from here last week. Has been weak since. Shortness of Breath     Discharge Recommendations:  Patient would benefit from continued therapy after discharge, 24 hour supervision or assist  OT Equipment Recommendations  Other: Will continue to assess and make recommendations for DME / AD    Patient Diagnosis(es): The encounter diagnosis was NSTEMI (non-ST elevated myocardial infarction) ([facility] Past Medical History:  has a past medical history of Atrial flutter ([facility] Hyperlipidemia, Hypertension, MI (myocardial infarction) ([facility] MS (multiple sclerosis) ([facility] and Pacemaker, artificial.  Past Surgical History:  has a past surgical history that includes pacemaker placement and Coronary angioplasty with stent. Assessment   Performance deficits / Impairments: Decreased endurance;Decreased coordination;Decreased ADL status; Decreased posture;Decreased sensation;Decreased balance;Decreased strength;Decreased ROM; Decreased safe awareness;Decreased cognition;Decreased fine motor control  Assessment: Pt currently has deficits / impairments which impact his ability to return to prior living situation / prior level of functioning. Pt will benefit from continued participation in Acute OT and Post-Acute OT services to improve those skills / functions.   Prognosis: Fair  Decision Making: High Complexity  REQUIRES OT FOLLOW-UP: Yes  Activity Tolerance  Activity Tolerance: Patient limited by fatigue;Patient limited by pain;Treatment limited secondary to decreased cognition          Plan   Plan  Times per Week: 5-6x/week  Times per Day: Daily  Current Treatment Recommendations: Strengthening, ROM, Balance training, Functional mobility training, Endurance training, Wheelchair mobility training, Neuromuscular re-education, Equipment evaluation, education, & procurement, Patient/Caregiver education & training, Safety education & training, Self-Care / ADL, Cognitive/Perceptual training, Coordination training     Restrictions  Restrictions/Precautions  Restrictions/Precautions: General Precautions  Required Braces or Orthoses?: No  Position Activity Restriction  Other position/activity restrictions: Pt uses an electric scooter at home and does not ambulate due to MS. Subjective   General  Patient assessed for rehabilitation services?: Yes  Family / Caregiver Present: No  Diagnosis: HFrEF;  NSTEMI;  Afib;  Hx MS. Subjective  Subjective: RN approved Pt to be seen for OT Evaluation. General Comment  Comments: Pt was agreeable / cooperative throughout. Social/Functional History  Social/Functional History  Lives With: Alone  Type of Home: House  Home Layout: One level  Home Access: Ramped entrance  Bathroom Shower/Tub: Tub/Shower unit  Bathroom Toilet: Handicap height  Bathroom Equipment: Tub transfer bench, Grab bars around toilet  Bathroom Accessibility: [facility]: Electric scooter  Has the patient had two or more falls in the past year or any fall with injury in the past year?: No  Receives Help From: Family  ADL Assistance: [address]: Needs assistance (,door dash)  Homemaking Responsibilities: Yes  Transfer Assistance: Independent  Active : No  Patient's  Info: Romy Gomez [address]  Mode of Transportation: Friends, Peggymariely Ratel  Occupation: Retired  Type of Occupation:  and   Additional Comments: Has a  that comes once a month and also occasionally transports to appointments. Objective   Safety Devices  Type of Devices: Gait belt; Chair alarm in place; Left in chair;Nurse notified;Call light within reach  Restraints  Restraints Initially in Place: No    Bed Mobility Training  Bed Mobility Training: Yes  Overall Level of Assistance: Minimum assistance; Additional time; Adaptive equipment  Interventions: Verbal cues; Tactile cues (Mod Cues for task initiation / sequencing / accuracy with DME)  Transfer Training  Transfer Training: Yes  Overall Level of Assistance: Maximum assistance; Adaptive equipment; Additional time;Assist X2 (Completed Transfers 2 different ways this date: Scoot Pivot from w/c to toilet (Max X2 Assist c Grab Bars - significant assist required);  progress to Nelly Richy c Max X2 (toilet to w/c then [address] - improved safety and technique c Nelly Mcpherson.)  Interventions: Verbal cues; Visual cues; Tactile cues (Mod Cues for task initiation / sequencing / accurate use of DME)  Sit to Stand: Assist X2;Adaptive equipment; Additional time;Maximum assistance  Stand to Sit: Maximum assistance; Adaptive equipment; Additional time;Assist X2  Toilet Transfer: Maximum assistance; Adaptive equipment;Assist X2;Additional time (Wheelchair to toilet with ANSHU Luna (Max x2, significant increase time / effort). Then Toilet to Nelly Mcpherson (to Recliner) with Max x2 (improved safety / balance / technique). )    ADL  UE Dressing: Increased time to complete; Adaptive equipment;Verbal cueing; Moderate assistance  UE Dressing Skilled Clinical Factors: Seated at EOB to doff / Idris Livings like a robe. LE Dressing: Maximum assistance; Increased time to complete;Verbal cueing  LE Dressing Skilled Clinical Factors: Pt initially refused all LB Dressing, was adamant not to wear shoes or socks, but was slipping on floor - then agreeable to socks, which required Max Assist seated at  EOB (demonstrating poor insight and safety). Toileting Skilled Clinical Factors: Pt was able to complete toileting tasks on elevated toilet (in bathroom) using Bilateral Grab bars. Completed toilet hygiene seated with SBA (signfiicant increase in time / effort). Refused underwear / brief mgmt.   Additional Comments: Wheelchair to toilet with Scoot Transfer (Max x2, significant increase time / effort). Then Toilet to Vania Yousif (to Recliner) with Max x2 (improved safety / balance / technique). Activity Tolerance  Activity Tolerance: Patient limited by endurance;Treatment limited secondary to medical complications     Vision  Vision: Impaired  Vision Exceptions: Wears glasses for reading  Hearing  Hearing: Exceptions to [facility]  Hearing Exceptions: Hard of hearing/hearing concerns    Cognition  Overall Cognitive Status: Exceptions  Following Commands: Follows all commands without difficulty; Follows multistep commands with increased time; Follows multistep commands with repitition; Follows one step commands consistently  Attention Span: Appears intact  Safety Judgement: Decreased awareness of need for safety;Decreased awareness of need for assistance  Problem Solving: Assistance required to identify errors made;Assistance required to implement solutions;Decreased awareness of errors  Insights: Decreased awareness of deficits  Initiation: Requires cues for some  Sequencing: Requires cues for some  Orientation  Overall Orientation Status: Within Functional Limits    LUE AROM (degrees)  LUE General AROM: Pt has grossly decreased AROM throughout BUEs. He is able to functionally move / reach with BUEs. Right UE / Hand are dominant. Able to use BUEs for reaching / grasp of grab bars, for transfers, etc.  Left Hand AROM (degrees)  Left Hand General AROM: Pt has significant atrophy of Bilateral Hands / Intrinsics. Left hand worse than Right hand. RUE AROM (degrees)  RUE General AROM: Pt has grossly decreased AROM throughout BUEs. He is able to functionally move / reach with BUEs. Right UE / Hand are dominant. Able to use BUEs for reaching / grasp of grab bars, for transfers, etc.  Right Hand AROM (degrees)  Right Hand General AROM: Pt has significant atrophy of Bilateral Hands / Intrinsics.   Right hand better than Left - is able to integrate some Tenodesis pattern for Right grasp (2 and 3-point pinch). Pt was provided max education / recomendation to f/u with OT (at SNF or OT) after DC for potential Splint (to reduce atrophy and contractures in Right hand). AM-PAC Score  AM-PAC Inpatient Daily Activity Raw Score: 9 (07/28/22 1733)  AM-PAC Inpatient ADL T-Scale Score : 25.33 (07/28/22 1733)  ADL Inpatient CMS 0-100% Score: 79.59 (07/28/22 1733)  ADL Inpatient CMS G-Code Modifier : CL (07/28/22 1733)      Goals  Short Term Goals  Time Frame for Short term goals: Within 14 treatment sessions  Short Term Goal 1: Pt will demo Good Tolerance and Participation with progressive HEP (Core / BUE and Hand Strength). Short Term Goal 2: Pt will maintain Fair Dynamic Sitting Balance (10-12 mins) while participating in Self-Care tasks. Short Term Goal 3: Pt will demo Supervision Assist and Fair+ Integration of AE during UB and LB ADLs. Short Term Goal 4: Pt will complete Bathroom Transfers and Toileting Tasks with SBA without LOB.       Therapy Time   Individual Concurrent Group Co-treatment   Time In 1007         Time Out 1053         Minutes 46         Timed Code Treatment Minutes: 23 Minutes (ADL)     ELLA Rome, OTR/L

## 2022-07-28 NOTE — CARE COORDINATION
07/28/22 1202   Readmission Assessment   Number of Days since last admission? 1-7 days   Previous Disposition Home with Family   Who is being Interviewed Patient   What was the patient's/caregiver's perception as to why they think they needed to return back to the hospital? Other (Comment)  (chest pain)   Did you visit your Primary Care Physician after you left the hospital, before you returned this time? No   Why weren't you able to visit your PCP? Did not have an appointment   Did you see a specialist, such as Cardiac, Pulmonary, Orthopedic Physician, etc. after you left the hospital? No   Who advised the patient to return to the hospital? Self-referral   Does the patient report anything that got in the way of taking their medications? No   In our efforts to provide the best possible care to you and others like you, can you think of anything that we could have done to help you after you left the hospital the first time, so that you might not have needed to return so soon?  Other (Comment)  (none)

## 2022-07-29 LAB
SARS-COV-2, RAPID: NOT DETECTED
SPECIMEN DESCRIPTION: NORMAL

## 2022-07-29 PROCEDURE — 2580000003 HC RX 258: Performed by: NURSE PRACTITIONER

## 2022-07-29 PROCEDURE — 87635 SARS-COV-2 COVID-19 AMP PRB: CPT

## 2022-07-29 PROCEDURE — 6370000000 HC RX 637 (ALT 250 FOR IP): Performed by: STUDENT IN AN ORGANIZED HEALTH CARE EDUCATION/TRAINING PROGRAM

## 2022-07-29 PROCEDURE — 2060000000 HC ICU INTERMEDIATE R&B

## 2022-07-29 PROCEDURE — 51702 INSERT TEMP BLADDER CATH: CPT

## 2022-07-29 PROCEDURE — 6360000002 HC RX W HCPCS: Performed by: STUDENT IN AN ORGANIZED HEALTH CARE EDUCATION/TRAINING PROGRAM

## 2022-07-29 PROCEDURE — 6370000000 HC RX 637 (ALT 250 FOR IP): Performed by: NURSE PRACTITIONER

## 2022-07-29 PROCEDURE — 6370000000 HC RX 637 (ALT 250 FOR IP): Performed by: UROLOGY

## 2022-07-29 PROCEDURE — 99232 SBSQ HOSP IP/OBS MODERATE 35: CPT | Performed by: STUDENT IN AN ORGANIZED HEALTH CARE EDUCATION/TRAINING PROGRAM

## 2022-07-29 RX ADMIN — ATORVASTATIN CALCIUM 80 MG: 40 TABLET, FILM COATED ORAL at 21:24

## 2022-07-29 RX ADMIN — FUROSEMIDE 20 MG: 10 INJECTION, SOLUTION INTRAMUSCULAR; INTRAVENOUS at 09:09

## 2022-07-29 RX ADMIN — SODIUM CHLORIDE, PRESERVATIVE FREE 10 ML: 5 INJECTION INTRAVENOUS at 21:24

## 2022-07-29 RX ADMIN — APIXABAN 5 MG: 5 TABLET, FILM COATED ORAL at 21:24

## 2022-07-29 RX ADMIN — ASPIRIN 81 MG CHEWABLE TABLET 81 MG: 81 TABLET CHEWABLE at 08:25

## 2022-07-29 RX ADMIN — APIXABAN 5 MG: 5 TABLET, FILM COATED ORAL at 08:25

## 2022-07-29 RX ADMIN — SODIUM CHLORIDE, PRESERVATIVE FREE 10 ML: 5 INJECTION INTRAVENOUS at 08:25

## 2022-07-29 RX ADMIN — FUROSEMIDE 20 MG: 10 INJECTION, SOLUTION INTRAMUSCULAR; INTRAVENOUS at 18:01

## 2022-07-29 RX ADMIN — METOPROLOL SUCCINATE 25 MG: 25 TABLET, EXTENDED RELEASE ORAL at 14:56

## 2022-07-29 RX ADMIN — TAMSULOSIN HYDROCHLORIDE 0.4 MG: 0.4 CAPSULE ORAL at 08:25

## 2022-07-29 ASSESSMENT — PAIN SCALES - GENERAL
PAINLEVEL_OUTOF10: 0

## 2022-07-29 NOTE — CARE COORDINATION
Transitional Planning    Spoke with patient and visitor at bedside. He states he would like a referral to [facility] for skilled therapy. Referral sent. Patient has many questions regarding long term benefits for home through his [facility] eligibility. Writer left  for [address] [employer] work department. 1129 received call from [place], [place]. They can accept. If patient is ready for discharge over the weekend contact Mina at [phone]. Will need a rapid Covid test. Will need HENS completed. Contact [facility] Nellie Sneed to complete HENS. [place] with [facility] eligibility social work. She updates writer that pt is 100% service connected so he is eligible for long term care at any SNF contracted facility. Also states that pt's assigned  is [employer] @ [phone]. Ana Luisa Malone states pt needs to contact [employer] to help set up aide services for after he is discharged from SNF. Patient and visitor at bedside, harman Enriquez's contact information and [facility] SNF contracted list for future planning. Patient is agreeable to transfer to [facility]. [address] with [facility], they can accommodate transport 7/30 at 1600    1407 Spoke with [facility]. Agreeable to 1600 7/30.    1530 Rapid covid negative. Need RN TRACEE completed. Clinicals will need faxed to [phone] with patient and updated, he is agreeable    1609 TRACEE completed. Covid results, HENS, and AVS faxed to [place]. Blue transport packet located by [employer] in middle nurse's station.     # for Report is [phone]

## 2022-07-29 NOTE — DISCHARGE INSTR - COC
Continuity of Care Form    Patient Name: Columba Mcclelland   :    MRN:  [id number]    Admit date:  2022  Discharge date:  22    Code Status Order: DNR-CCA   Advance Directives:     Admitting Physician:  Mercy Patricio MD  PCP: Osmar Mcintyre MD    Discharging Nurse: [facility] Unit/Room#: 666/504-35  Discharging Unit Phone Number: [phone]    Emergency Contact:   Extended Emergency Contact Information  Primary Emergency Contact: [address] Phone: [phone]  Mobile Phone: [phone]  Relation: Child    Past Surgical History:  Past Surgical History:   Procedure Laterality Date    CORONARY ANGIOPLASTY WITH STENT PLACEMENT      PACEMAKER PLACEMENT         Immunization History: There is no immunization history on file for this patient.     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    Coronary artery disease involving native coronary artery of native heart I25.10    Ischemic cardiomyopathy I25.5    Stage 3a chronic kidney disease (HCC) N18.31    Chronic atrial fibrillation ([facility]) I48.20    NSTEMI (non-ST elevated myocardial infarction) ([facility] [address] I21.4       Isolation/Infection:   Isolation            No Isolation          Patient Infection Status       None to display            Nurse Assessment:  Last Vital Signs: BP (!) 117/58   Pulse 70   Temp 98.2 °F (36.8 °C) (Oral)   Resp 16   Ht 5' 9\" (1.753 m)   Wt 165 lb 12.6 oz (75.2 kg)   SpO2 95%   BMI 24.48 kg/m²     Last documented pain score (0-10 scale): Pain Level: 0  Last Weight:   Wt Readings from Last 1 Encounters:   22 165 lb 12.6 oz (75.2 kg)     Mental Status:  oriented, alert, coherent, logical, thought processes intact, and able to concentrate and follow conversation    IV Access:  - None    Nursing Mobility/ADLs:  Walking   Dependent  Transfer  Dependent  Bathing  Assisted  Dressing Fair    Condition at Discharge: Stable    Rehab Potential (if transferring to Rehab): Good    Recommended Labs or Other Treatments After Discharge: PT/OT     Physician Certification: I certify the above information and transfer of Justyna Manriquez  is necessary for the continuing treatment of the diagnosis listed and that he requires [facility] for greater 30 days.      Update Admission H&P: No change in H&P    PHYSICIAN SIGNATURE:  Electronically signed by Courtney Lopez MD on 7/29/22 at 1:13 PM EDT

## 2022-07-29 NOTE — PROGRESS NOTES
[facility]  Office: [address], DO, Supriya Press, DO, Diana Nielsening, DO, Alda Campo Blood, DO, Ralph Olivarez MD, Vianey Tariq MD, Eliseo Serrano MD, Nathalia Tinoco MD,  Nissa Roy MD, Kasey Santos MD, Yarely Alaniz, DO, Branden Patricio MD,  Radha Max MD, Melony Vanessa MD, Laurel Montiel, DO, Pedro Boland MD, Suly Mcclelland MD, Cindy Singer MD, Darrion Butler, DO, Linda Contreras MD, Dereck Rodas MD, Juni Carlisle, CNP,  Cyrus Villa, CNP, Kai Donald, CNP, Keke Snowden, CNP, Roylene Goodell, PA-C, Nate Griffiths, DNP, Shantal Chase, CNP, Radha Urrutia, CNP, Gena Riley, CNP, Belgica Pereira, CNP, Manjinder Cornea, CNP, Carol Mendez, CNS, Michele Hay, [facility], Kristina Cardozo, CNP, Carmurray Pro, CNP, Godfrey Perez, [address] [place] [address]    Progress Note    7/29/2022    10:56 AM    Name:   Ti Hawkins  MRN:     [id number]     Acct:      [de-identified]   Room:   [address] [place] [address] Day:  2  Admit Date:  7/27/2022 10:56 AM    PCP:   Janak Barone MD  Code Status:  DNR-CCA    Subjective:     C/C:   Chief Complaint   Patient presents with    Fatigue     Discharged from here last week. Has been weak since. Shortness of Breath     Interval History Status: improved. Patient was seen and examined at bedside. He reports feeling \"okay\" but continues to endorse shortness of breath. Bilateral lower extremity edema is improving. Plan to continue IV diuresis for an additional day with anticipated discharge tomorrow AM. Patient states he is interested in SNF placement. Brief History:     Ti Hawkins is a 68 y.o. male with a past medical history of HFrEF (last EF 25%), atrial fibrillation, CKD Stage 3a and hyperlipidemia who presented to the emergency department on 7/28/2022 complaining of shortness of breath, fatigue and leg swelling.  The patient states that his symptoms began several days ago and have progressively worsened. He reports that he was feeling so fatigued yesterday that he was unable to get out of bed. The patient was recently discharged from this facility on 7/21 following admission for chest pain and dehydration. In the ED, the patient was afebrile and nontoxic appearing. Troponin was noted to be elevated at 127 (baseline in the 30's). Pro-BNP was also elevated at 5024 (baseline around 1000). The patient clinically appeared to be volume overloaded. The patient is admitted to internal medicine for further management of NSTEMI as well as an acute HFrEF exacerbation. Review of Systems:     Constitutional:  negative for chills, fevers, sweats  Respiratory:  Positive for shortness of breath. Cardiovascular:  Positive for bilateral lower extremity edema. Gastrointestinal:  negative for abdominal pain, constipation, diarrhea, nausea, vomiting  Neurological:  negative for dizziness, headache    Medications: Allergies:     Allergies   Allergen Reactions    Codeine     Doxycycline     Erythromycin     Gammagard [Immune Globulin]     Sulfa Antibiotics        Current Meds:   Scheduled Meds:    furosemide  20 mg IntraVENous BID    tamsulosin  0.4 mg Oral Daily    apixaban  5 mg Oral BID    sodium chloride flush  5-40 mL IntraVENous 2 times per day    atorvastatin  80 mg Oral Nightly    aspirin  81 mg Oral Daily    metoprolol succinate  25 mg Oral Daily    lisinopril  5 mg Oral Daily     Continuous Infusions:    sodium chloride       PRN Meds: sodium chloride flush, sodium chloride, potassium chloride **OR** potassium alternative oral replacement **OR** potassium chloride, magnesium sulfate, ondansetron **OR** ondansetron, acetaminophen **OR** acetaminophen, magnesium hydroxide, nitroGLYCERIN    Data:     Past Medical History:   has a past medical history of Atrial flutter ([facility] [address] Hyperlipidemia, Hypertension, MI (myocardial infarction) ([facility] [address] MS (multiple sclerosis) ([facility] [address] and Pacemaker, artificial.    Social History:   reports that he has quit smoking. He has never used smokeless tobacco. He reports that he does not drink alcohol and does not use drugs. Family History: History reviewed. No pertinent family history. Vitals:  BP (!) 98/54   Pulse 77   Temp 98.2 °F (36.8 °C) (Oral)   Resp 18   Ht 5' 9\" (1.753 m)   Wt 165 lb 12.6 oz (75.2 kg)   SpO2 92%   BMI 24.48 kg/m²   Temp (24hrs), Av.6 °F (37 °C), Min:98.2 °F (36.8 °C), Max:99.3 °F (37.4 °C)    No results for input(s): POCGLU in the last 72 hours. I/O (24Hr):     Intake/Output Summary (Last 24 hours) at 2022 1056  Last data filed at 2022 0452  Gross per 24 hour   Intake --   Output 1100 ml   Net -1100 ml       Labs:  Hematology:  Recent Labs     22  1116 22  0128   WBC 8.1 7.8 8.6   RBC 4.25* 3.99* 4.14*   HGB 12.0* 11.5* 11.6*   HCT 36.6* 34.6* 36.7*   MCV 86.2 86.7 88.6   MCH 28.3 28.7 28.0   MCHC 32.8 33.2 31.6   RDW 14.6 14.4 14.2    276 302   MPV 8.0 8.2 10.0     Chemistry:  Recent Labs     22  1116 22  1302 22  0128     --   --  133*   K 3.4*  --   --  3.7   CL 99  --   --  99   CO2 25  --   --  22   GLUCOSE 117*  --   --  117*   BUN 22  --   --  20   CREATININE 1.40*  --   --  1.28*   MG  --   --   --  1.9   ANIONGAP 12  --   --  12   LABGLOM 49*  --   --  55*   GFRAA 60*  --   --  >60   CALCIUM 9.1  --   --  8.9   PROBNP 5,024*  --   --  4,626*   TROPHS 127* 108* 86* 87*     Recent Labs     22  0128   PROT 6.4   LABALBU 3.2*   AST 15   ALT 19   ALKPHOS 76   BILITOT 0.50   CHOL 130   HDL 33*   LDLCHOLESTEROL 78   CHOLHDLRATIO 3.9   TRIG 94     ABG:No results found for: POCPH, PHART, PH, POCPCO2, NOU1YFB, PCO2, POCPO2, PO2ART, PO2, POCHCO3, JFZ8IZI, HCO3, NBEA, PBEA, BEART, BE, THGBART, THB, XXB2OJC, STRS0ZLT, T9MVEBRY, O2SAT, FIO2  No results found for: SPECIAL  No results found for: CULTURE    Radiology:  XR CHEST PORTABLE    Result Date: 7/27/2022  No acute process.  Stable cardiomegaly       Physical Examination:     General appearance:  alert, cooperative and no distress  Mental Status:  oriented to person, place and time and normal affect  Lungs:  clear to auscultation bilaterally, normal effort  Heart:  regular rate and rhythm, no murmur  Abdomen:  soft, nontender, nondistended, normal bowel sounds, no masses, hepatomegaly, splenomegaly  Extremities:  1+ pitting edema in bilateral lower extremities   Skin:  no gross lesions, rashes, induration    Assessment:     Hospital Problems             Last Modified POA    * (Principal) NSTEMI (non-ST elevated myocardial infarction) ([facility] [address] 7/27/2022 Yes    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 ([facility] [address] 7/28/2022 Yes    Coronary artery disease involving native coronary artery of native heart 7/28/2022 Yes    Stage 3a chronic kidney disease ([facility] [address] 7/28/2022 Yes    Chronic atrial fibrillation ([facility] [address] 7/28/2022 Yes       Plan:     NSTEMI  -Heparin drip has been stopped   -Troponin is trending down  -Most recent echocardiogram (7/21/2022) showing severely reduced EF of 25%  -Cardiology is following; appreciate recommendations   -Aspirin 81 mg daily  -Atorvastatin 80 mg nightly     HFrEF  -Currently in acute exacerbation  -Continue IV furosemide 20 mg bid x 1 additional day   -Strict I&O's  -Daily weights  -Diet low sodium  -Continue home lisinopril 5 mg daily  -Most recent EF 25% as above (AICD in place)  -Consult cardiology as above     CKD Stage 3a  -Creatinine is at baseline  -Daily BMP     Atrial fibrillation  -Resume home Eliquis   -Continue home metoprolol 25 mg daily     Hyperlipidemia  -Atorvastatin 80 mg nightly as above     Shefali Weeks MD  7/29/2022  10:56 AM

## 2022-07-29 NOTE — PROGRESS NOTES
[facility]  Progress Note                   Date:   7/29/2022  Patient name: Blair Grullon  Date of admission:  7/27/2022 10:56 AM  MRN:   [id number]  YOB: 1945  PCP: Demetrius Moraes MD    Reason for Admission: NSTEMI (non-ST elevated myocardial infarction) ([facility] [address] [I21.4]    Subjective:       Clinical Changes /Abnormalities: seen & examined in room. No acute CV issues/concerns overnight. Urology consulted for retention. Labs, vitals, & tele reviewed. Review of Systems    Medications:   Scheduled Meds:   furosemide  20 mg IntraVENous BID    tamsulosin  0.4 mg Oral Daily    apixaban  5 mg Oral BID    sodium chloride flush  5-40 mL IntraVENous 2 times per day    atorvastatin  80 mg Oral Nightly    aspirin  81 mg Oral Daily    metoprolol succinate  25 mg Oral Daily    lisinopril  5 mg Oral Daily     Continuous Infusions:   sodium chloride       CBC:   Recent Labs     07/27/22  1116 07/27/22 2025 07/28/22  0128   WBC 8.1 7.8 8.6   HGB 12.0* 11.5* 11.6*    276 302     BMP:    Recent Labs     07/27/22  1116 07/28/22  0128    133*   K 3.4* 3.7   CL 99 99   CO2 25 22   BUN 22 20   CREATININE 1.40* 1.28*   GLUCOSE 117* 117*     Hepatic:  Recent Labs     07/28/22  0128   AST 15   ALT 19   BILITOT 0.50   ALKPHOS 76     Troponin:   Recent Labs     07/27/22  1302 07/27/22 2025 07/28/22  0128   TROPHS 108* 86* 87*     BNP: No results for input(s): BNP in the last 72 hours. Lipids:   Recent Labs     07/28/22  0128   CHOL 130   HDL 33*     INR: No results for input(s): INR in the last 72 hours. Objective:   Vitals: BP (!) 98/54   Pulse 71   Temp 98.5 °F (36.9 °C) (Oral)   Resp 18   Ht 5' 9\" (1.753 m)   Wt 165 lb 12.6 oz (75.2 kg)   SpO2 94%   BMI 24.48 kg/m²   General appearance: alert and cooperative with exam  HEENT: Head: Normocephalic, no lesions, without obvious abnormality.   Neck:no JVD, trachea midline, no adenopathy  Lungs: Clear to auscultation  Heart: Regular rate and rhythm, s1/s2 auscultated, no murmurs  Abdomen: soft, non-tender, bowel sounds active  Extremities: no edema  Neurologic: not done    Echo 7/22/22  ECHO:     Summary  Technically difficult study. Left ventricle is normal in size and normal left ventricular wall thickness. Global left ventricular systolic function is severely reduced Estimated  ejection fraction is 25-30%. Right ventricle is normal in size with reduced function. Mild aortic insufficiency. Mild mitral regurgitation. Mild tricuspid regurgitation. Estimated right ventricular systolic pressure  is 25.80 mmHg. Aortic root and ascending aorta are mildly dilated measuring 3.9 cm. IVC Increased diameter, but still has inspiratory variation suggesting upper  normal or mildly elevated RA filling pressure (i.e. CVP) . Cardiac Angiography:   [facility]-  angioplasty of the RCA for in-stent restenosis in July 2020. Report unavailable     Assessment / Acute Cardiac Problems:   Acute/ chronic CHF  2. Elevation of trops 127/108/86/87 could be  type 1 versus type 2 secondary to CHF /CKD  3. Chronic ischemic cardiomyopathy with BIV AICD in place  4. 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 On RA lying in bed without distress. Has diuresed well, -1.6L since admission. Continue PO ACE, BB, and will switch Lasix to oral. Long discussion regarding monitoring PO fluid intake, urine ouput, and daily weight. Working on rehab placement per patient  Continue PO BB & Eliquis  OK for discharge from CV standpoint.  He follows with cardiologist at the [facility] in [place] - advised to f/u in 2-3 weeks with them OR call our office for f/u (he states he is going to check with the [facility] first.)    Electronically signed by SAVANNA Anderson CNP on 7/29/2022 at [address]  [phone]

## 2022-07-29 NOTE — PLAN OF CARE
Problem: Discharge Planning  Goal: Discharge to home or other facility with appropriate resources  Outcome: Progressing     Problem: Skin/Tissue Integrity  Goal: Absence of new skin breakdown  Description: 1. Monitor for areas of redness and/or skin breakdown  2. Assess vascular access sites hourly  3. Every 4-6 hours minimum:  Change oxygen saturation probe site  4. Every 4-6 hours:  If on nasal continuous positive airway pressure, respiratory therapy assess nares and determine need for appliance change or resting period.   Outcome: Progressing     Problem: ABCDS Injury Assessment  Goal: Absence of physical injury  Outcome: Progressing  Flowsheets (Taken 7/29/2022 1129)  Absence of Physical Injury: Implement safety measures based on patient assessment     Problem: Safety - Adult  Goal: Free from fall injury  Outcome: Progressing  Flowsheets (Taken 7/29/2022 1129)  Free From Fall Injury: Instruct family/caregiver on patient safety     Problem: Pain  Goal: Verbalizes/displays adequate comfort level or baseline comfort level  Outcome: Progressing  Flowsheets (Taken 7/29/2022 0830)  Verbalizes/displays adequate comfort level or baseline comfort level:   Encourage patient to monitor pain and request assistance   Assess pain using appropriate pain scale   Administer analgesics based on type and severity of pain and evaluate response   Implement non-pharmacological measures as appropriate and evaluate response   Consider cultural and social influences on pain and pain management     Problem: Chronic Conditions and Co-morbidities  Goal: Patient's chronic conditions and co-morbidity symptoms are monitored and maintained or improved  Outcome: Progressing

## 2022-07-29 NOTE — PLAN OF CARE
Problem: Discharge Planning  Goal: Discharge to home or other facility with appropriate resources  7/29/2022 0146 by Miles King RN  Outcome: Progressing  Flowsheets (Taken 7/28/2022 2000)  Discharge to home or other facility with appropriate resources: Identify barriers to discharge with patient and caregiver  7/28/2022 1854 by Ying Cardona RN  Outcome: Progressing     Problem: Skin/Tissue Integrity  Goal: Absence of new skin breakdown  Description: 1. Monitor for areas of redness and/or skin breakdown  2. Assess vascular access sites hourly  3. Every 4-6 hours minimum:  Change oxygen saturation probe site  4. Every 4-6 hours:  If on nasal continuous positive airway pressure, respiratory therapy assess nares and determine need for appliance change or resting period.   7/29/2022 0146 by Miles King RN  Outcome: Progressing  7/28/2022 1854 by Ying Cardona RN  Outcome: Progressing     Problem: ABCDS Injury Assessment  Goal: Absence of physical injury  7/29/2022 0146 by Miles King RN  Outcome: Progressing  7/28/2022 1854 by Ying Cardona RN  Outcome: Progressing  Flowsheets  Taken 7/28/2022 0641 by Ying Cardona RN  Absence of Physical Injury: Implement safety measures based on patient assessment  Taken 7/28/2022 0516 by Miles King RN  Absence of Physical Injury: Implement safety measures based on patient assessment     Problem: Safety - Adult  Goal: Free from fall injury  7/29/2022 0146 by Miles King RN  Outcome: Progressing  7/28/2022 1854 by Ying Cardona RN  Outcome: Progressing  Flowsheets  Taken 7/28/2022 0832 by Ying Cardona RN  Free From Fall Injury: Instruct family/caregiver on patient safety  Taken 7/28/2022 0516 by Miles King RN  Free From Fall Injury: Instruct family/caregiver on patient safety     Problem: Pain  Goal: Verbalizes/displays adequate comfort level or baseline comfort level  7/29/2022 0146 by Miles King RN  Outcome: Progressing  7/28/2022 1854 by Laurel Pérez RN  Outcome: Progressing     Problem: Chronic Conditions and Co-morbidities  Goal: Patient's chronic conditions and co-morbidity symptoms are monitored and maintained or improved  7/29/2022 0146 by Fay Seaman RN  Outcome: Progressing  Flowsheets  Taken 7/28/2022 2000 by Fay Seaman RN  Care Plan - Patient's Chronic Conditions and Co-Morbidity Symptoms are Monitored and Maintained or Improved: Monitor and assess patient's chronic conditions and comorbid symptoms for stability, deterioration, or improvement  Taken 7/28/2022 1908 by Laurel Pérez RN  Care Plan - Patient's Chronic Conditions and Co-Morbidity Symptoms are Monitored and Maintained or Improved:   Monitor and assess patient's chronic conditions and comorbid symptoms for stability, deterioration, or improvement   Collaborate with multidisciplinary team to address chronic and comorbid conditions and prevent exacerbation or deterioration   Update acute care plan with appropriate goals if chronic or comorbid symptoms are exacerbated and prevent overall improvement and discharge  7/28/2022 1854 by Laurel Pérez RN  Outcome: Progressing

## 2022-07-30 VITALS
SYSTOLIC BLOOD PRESSURE: 121 MMHG | OXYGEN SATURATION: 93 % | TEMPERATURE: 98.4 F | DIASTOLIC BLOOD PRESSURE: 64 MMHG | RESPIRATION RATE: 18 BRPM | HEIGHT: 69 IN | WEIGHT: 169.31 LBS | HEART RATE: 73 BPM | BODY MASS INDEX: 25.08 KG/M2

## 2022-07-30 PROCEDURE — 97535 SELF CARE MNGMENT TRAINING: CPT

## 2022-07-30 PROCEDURE — 6360000002 HC RX W HCPCS: Performed by: STUDENT IN AN ORGANIZED HEALTH CARE EDUCATION/TRAINING PROGRAM

## 2022-07-30 PROCEDURE — 94760 N-INVAS EAR/PLS OXIMETRY 1: CPT

## 2022-07-30 PROCEDURE — 6370000000 HC RX 637 (ALT 250 FOR IP): Performed by: NURSE PRACTITIONER

## 2022-07-30 PROCEDURE — 2580000003 HC RX 258: Performed by: NURSE PRACTITIONER

## 2022-07-30 PROCEDURE — 6370000000 HC RX 637 (ALT 250 FOR IP): Performed by: STUDENT IN AN ORGANIZED HEALTH CARE EDUCATION/TRAINING PROGRAM

## 2022-07-30 PROCEDURE — 6370000000 HC RX 637 (ALT 250 FOR IP): Performed by: UROLOGY

## 2022-07-30 PROCEDURE — 2700000000 HC OXYGEN THERAPY PER DAY

## 2022-07-30 PROCEDURE — 99238 HOSP IP/OBS DSCHRG MGMT 30/<: CPT | Performed by: STUDENT IN AN ORGANIZED HEALTH CARE EDUCATION/TRAINING PROGRAM

## 2022-07-30 PROCEDURE — 97112 NEUROMUSCULAR REEDUCATION: CPT

## 2022-07-30 PROCEDURE — 97530 THERAPEUTIC ACTIVITIES: CPT

## 2022-07-30 RX ORDER — TAMSULOSIN HYDROCHLORIDE 0.4 MG/1
0.4 CAPSULE ORAL NIGHTLY
Qty: 30 CAPSULE | Refills: 3 | Status: SHIPPED | OUTPATIENT
Start: 2022-07-30

## 2022-07-30 RX ADMIN — METOPROLOL SUCCINATE 25 MG: 25 TABLET, EXTENDED RELEASE ORAL at 09:59

## 2022-07-30 RX ADMIN — FUROSEMIDE 20 MG: 10 INJECTION, SOLUTION INTRAMUSCULAR; INTRAVENOUS at 09:59

## 2022-07-30 RX ADMIN — SODIUM CHLORIDE, PRESERVATIVE FREE 10 ML: 5 INJECTION INTRAVENOUS at 10:03

## 2022-07-30 RX ADMIN — ASPIRIN 81 MG CHEWABLE TABLET 81 MG: 81 TABLET CHEWABLE at 09:59

## 2022-07-30 RX ADMIN — TAMSULOSIN HYDROCHLORIDE 0.4 MG: 0.4 CAPSULE ORAL at 09:59

## 2022-07-30 RX ADMIN — LISINOPRIL 5 MG: 5 TABLET ORAL at 09:59

## 2022-07-30 RX ADMIN — APIXABAN 5 MG: 5 TABLET, FILM COATED ORAL at 09:59

## 2022-07-30 NOTE — DISCHARGE INSTR - DIET

## 2022-07-30 NOTE — PROGRESS NOTES
Occupational Therapy  Facility/Department: [facility] [address] [place] [address] ICU  Occupational Therapy Daily Treatment Note    Name: Justyna Manriquez  : 1945  MRN: [id number]  Date of Service: 2022    Discharge Recommendations:  Patient would benefit from continued therapy after discharge       Patient Diagnosis(es): The encounter diagnosis was NSTEMI (non-ST elevated myocardial infarction) ([facility] [address] Past Medical History:  has a past medical history of [facility] flutter ([facility] Hyperlipidemia, Hypertension, MI (myocardial infarction) ([facility] MS (multiple sclerosis) ([facility] and Pacemaker, artificial.  Past Surgical History:  has a past surgical history that includes pacemaker placement and Coronary angioplasty with stent. Assessment   Performance deficits / Impairments: Decreased endurance;Decreased coordination;Decreased ADL status; Decreased posture;Decreased sensation;Decreased balance;Decreased strength;Decreased safe awareness;Decreased cognition;Decreased fine motor control;Decreased functional mobility   Assessment: Pt currently limited in performing ADL tasks and functional transfers due to above noted deficits, most significantly decreased balance and decreased BUE coordination. Pt to benefit from continued therapy services while hospitalized to maximize pt's safety and independence in performing functional tasks. Pt unsafe to return to prior living arrangements based on pt's current functional ability, continued skilled therapy intervention recommended at this time. Prognosis: Fair  REQUIRES OT FOLLOW-UP: Yes  Activity Tolerance  Activity Tolerance: Patient limited by fatigue      Education Given To: Patient  Education Provided: Role of Therapy;Plan of Care;Precautions; ADL Adaptive Strategies;Transfer Training;Energy Conservation;Equipment; Fall Prevention Strategies  Education Method: Demonstration;Verbal  Barriers to Learning: None  Education Outcome: Verbalized understanding;Continued education needed  Safety Devices  Type of Devices: Nurse notified;Call light within reach; Left in bed;Bed alarm in place  Restraints  Restraints Initially in Place: No    Plan   Plan  Times per Week: 5-6x/wk  Times per Day: Daily  Current Treatment Recommendations: Strengthening, ROM, Balance training, Functional mobility training, Endurance training, Wheelchair mobility training, Neuromuscular re-education, Equipment evaluation, education, & procurement, Patient/Caregiver education & training, Safety education & training, Self-Care / ADL, Cognitive/Perceptual training, Coordination training     Restrictions  Restrictions/Precautions  Restrictions/Precautions: Fall Risk  Required Braces or Orthoses?: No  Position Activity Restriction  Other position/activity restrictions: Up with assistance    Subjective   General  Patient assessed for rehabilitation services?: Yes  Family / Caregiver Present: No  Diagnosis: HFrEF;  NSTEMI;  Afib;  Hx MS. Subjective  Subjective: RN approved Pt to be seen for OT Evaluation. General Comment  Comments: RN ok'd for therapy this AM. Pt agreeable to participate in session and pleasant/cooperative throughout. Pt with no reports of pain during session however states fatigue and mild dizziness with activity, RN aware. denies pain this session      Objective   Cognition  Overall Cognitive Status: Exceptions  Arousal/Alertness: Appropriate responses to stimuli  Following Commands: Follows multistep commands with increased time; Follows multistep commands with repitition  Attention Span: Appears intact  Memory: Appears intact  Safety Judgement: Decreased awareness of need for safety;Decreased awareness of need for assistance  Problem Solving: Assistance required to identify errors made;Assistance required to implement solutions;Decreased awareness of errors;Assistance required to generate solutions  Insights: Decreased awareness of deficits  Initiation: Requires cues for some  Sequencing: Requires cues for some  Cognition Comment: Pt with decreased safety awareness in regards to his need for assist with functional mobility. Orientation  Overall Orientation Status: Within Functional Limits       Bed Mobility  Supine to sit: Contact guard assistance  Sit to supine: Contact guard assistance  Scooting: Contact guard assistance  Comments: HOB raised ~30* and use of bed rails; increased time/effort to perform    Balance  Sitting balance: Stand by assistance (  Standing balance: Maximum assistance (3x bouts of static standing ~15-45 seconds at each bout, 2x bouts requiring min A and pt with high reliance of BUE on brannon stedy bar, max A x1 bout and pt with high reliance of BUE on brannon stedy bar)    ADL  Grooming: Increased time to complete;Minimal assistance (seated upright on brannon stedy seat at sink side to perform hand hygiene and comb hair)  Toileting: Increased time to complete;Maximum assistance (max A x2 and use of brannon stedy for toilet transfer, max A for LB clothing mngt and bottom hygiene)  Comments: Increased time/effort throughout as pt with slow pace and noted fatigue following minimal exertion, decreased sitting/standing balance, decreased functional transfers, significant BUE ROM/coordination deficits with BUE intention tremors present throughout all engagement in ADL tasks. Transfers  Sit to stand: Maximum assistance;2 Person assistance (4x sit > stand transfers with use of brannon stedy, pt with high reliance of BUE on brannon stedy bar for support)  Stand to sit: Maximum assistance;2 Person assistance  Transfer Comments: Min VCs for proper hand placement/sequencing/safety awareness with use of brannon stedy. Increased time/effort to perform.     Toilet Transfers  Toilet - Technique:  (brannon stedy)  Equipment Used: Standard toilet (brannon stedy bar)  Toilet Transfer: Maximum assistance;2 Person assistance       AM-PAC Score  AM-[facility] Inpatient Daily Activity Raw Score: 12 (07/30/22 1137)  AM-PAC Inpatient ADL T-Scale Score : 30.6 (07/30/22 1137)  ADL Inpatient CMS 0-100% Score: 66.57 (07/30/22 1137)  ADL Inpatient CMS G-Code Modifier : CL (07/30/22 1137)      Goals  Short Term Goals  Time Frame for Short term goals: Within 14 treatment sessions  Short Term Goal 1: Pt will demo Good Tolerance and Participation with progressive HEP (Core / BUE and Hand Strength). Short Term Goal 2: Pt will maintain Fair Dynamic Sitting Balance (10-12 mins) while participating in Self-Care tasks. Short Term Goal 3: Pt will demo Supervision Assist and Fair+ Integration of AE during UB and LB ADLs. Short Term Goal 4: Pt will complete Bathroom Transfers and Toileting Tasks with SBA without LOB.        Therapy Time   Individual Concurrent Group Co-treatment   Time In 2537         Time Out 1028         Minutes 38         Timed Code Treatment Minutes: [address], OTR/L

## 2022-07-30 NOTE — PROGRESS NOTES
Physical Therapy  Facility/Department: [facility]  Daily Treatment Note  NAME: Yun Cano  : 1945  MRN: [id number]  Chief Complaint   Patient presents with    Fatigue     Discharged from here last week. Has been weak since. Shortness of Breath      Date of Service: 2022    Discharge Recommendations:  Patient would benefit from continued therapy after discharge   PT Equipment Recommendations  Equipment Needed: No  Other: Pt has all needed equipment at this time. Patient Diagnosis(es): The encounter diagnosis was NSTEMI (non-ST elevated myocardial infarction) ([facility] [address] Assessment   Assessment: Pt requires maxA x 2 with all functional mobility. Pt would be unsafe to return to home environment at this time and requires 24 hour assistance at this time for all care and mobility. Would recommend continued therapy upon discharge to progress towards independence with functional mobility. Activity Tolerance: Patient limited by endurance;Treatment limited secondary to medical complications  Equipment Needed: No  Other: Pt has all needed equipment at this time. Plan    Plan  Plan:  (5-6x/week)  Current Treatment Recommendations: Balance training;Functional mobility training;Transfer training;Patient/Caregiver education & training; Safety education & training; Therapeutic activities     Restrictions  Restrictions/Precautions  Restrictions/Precautions: Fall Risk  Required Braces or Orthoses?: No  Position Activity Restriction  Other position/activity restrictions: Up with assistance     Subjective    Subjective  Subjective: Pt supine in bed. Pt and nursing agreeable to PT session this AM  Pain: denies pain this session  Orientation  Overall Orientation Status: Within Functional Limits  Cognition  Overall Cognitive Status: Exceptions  Arousal/Alertness: Appropriate responses to stimuli  Following Commands: Follows multistep commands with increased time; Follows multistep commands with repitition  Attention Span: Appears intact  Memory: Appears intact  Safety Judgement: Decreased awareness of need for safety;Decreased awareness of need for assistance  Problem Solving: Assistance required to identify errors made;Assistance required to implement solutions;Decreased awareness of errors;Assistance required to generate solutions  Insights: Decreased awareness of deficits  Initiation: Requires cues for some  Sequencing: Requires cues for some  Cognition Comment: Pt with decreased safety awareness in regards to his need for assist with functional mobility. Objective   Vitals     Bed Mobility Training  Bed Mobility Training: Yes  Overall Level of Assistance: Contact-guard assistance  Interventions: Verbal cues; Tactile cues  Supine to Sit: Contact-guard assistance  Sit to Supine: Contact-guard assistance  Scooting: Minimum assistance  Balance  Sitting:  (Pt able to sit unsupported at EOB x 5 min and 7 min sitting on SERA this session.)  Transfer Training  Transfer Training: Yes  Overall Level of Assistance: Maximum assistance; Adaptive equipment; Additional time;Assist X2  Interventions: Verbal cues; Tactile cues; Visual cues  Sit to Stand: Adaptive equipment; Additional time;Maximum assistance;Assist X2 (use of SERA STEDY)  Stand to Sit: Adaptive equipment; Additional time;Maximum assistance;Assist X2 (use of SERA steady)  Toilet Transfer: Maximum assistance; Adaptive equipment;Assist X2;Additional time  Gait Training  Gait Training: No  Wheelchair Management  Wheelchair Management: No  Neuromuscular Education  NDT Treatment: Sitting  Facilitation techniques: stabilizing reversals with manual cues along ant/post scapula,  rhythmic stabs with manual cues along ant/post scapula. Seated reaching with BUEs. Neuromuscular Comments: Pt unable to reach outside of his BRYANT. without LOB. poor dynamic trunk stabilization. PT Exercises  Exercise Treatment: sit <>stand with SERA max A x 2  4 trials.   Static Standing Balance Exercises: 30 sec static stand with SS CGA x 1.   1' static stand x 3 with SS maxA x 1-2. Safety Devices  Type of Devices: Left in bed;Call light within reach; Bed alarm in place;Gait belt  Restraints  Restraints Initially in Place: No       Goals  Short Term Goals  Time Frame for Short term goals: 14 visits  Short term goal 1: Pt to be Modified (I) with bed mobility. Short term goal 2: Pt to transfer with Modified (I) from [place] [address] to various surfaces with modifications as necessary. Short term goal 3: Pt to improve dynamic balance during transfers to [facility] to reduce risk for falls.   Patient Goals   Patient goals : Return home    Education  Patient Education  Education Given To: Patient  Education Provided Comments: transfer sequencing and safety  Education Method: Verbal  Barriers to Learning: None  Education Outcome: Continued education needed    Therapy Time   Individual Concurrent Group Co-treatment   Time In 0946         Time Out 1028         Minutes 42             PT treatment time:  32 min    Lisy Trinidad, PT

## 2022-07-30 NOTE — PLAN OF CARE
Problem: Discharge Planning  Goal: Discharge to home or other facility with appropriate resources  Outcome: Progressing   Plans for discharge today to LONG TERM ACUTE CARE [facility]. Transportation set up for 1600.

## 2022-07-30 NOTE — DISCHARGE SUMMARY
[facility]  Office: [address], DO, Debi Angulo, DO, Kati Consuelo, DO, Melinda Tessa Serrato, DO, Oliva Chan MD, Oscar Mills MD, Jose Juan Gong MD, Pierre De La Torre MD,  Columba Wills MD, Apryl Samuel MD, Clara Childers, DO, Berenice Evans MD,  Adrianne Gifford MD, Yesi Esparza MD, Alexandra Doyle DO, Israel Garnett MD, Liseth Joe MD, Roberto Morales MD, Analia Jorgensen DO, Debora Garcia MD, Rosy Schirmer, MD, Luba Amaro, CNP,  Sugey Arndt, CNP, Kitty Weathers, CNP, Meme Glass, CNP, Herb Beaulieu PA-C, Love Villarreal, [facility], Elsa Prakash, CNP, Jenni Aguilar, CNP, Ana Paula Estrada, CNP, Arielle Mejía, CNP, Liberty Sinha, CNP, Santy Elmore, CNS, Minerva Vogt, [facility], Jack Bustillos, CNP, Pamela Shay, CNP, Josef Bustos, [facility]           [address] [facility]    Discharge Summary     Patient ID: Rajiv Bryson  :  [birth year]   MRN: [id number]     ACCOUNT:  [de-identified]   Patient's PCP: Ari Sharpe MD  Admit Date: 2022   Discharge Date: 2022     Length of Stay: 3  Code Status:  DNR-CCA  Admitting Physician: Yesi Esparza MD  Discharge Physician: Yesi Esparza MD     Active Discharge Diagnoses:     Hospital Problem Lists:  Principal Problem:    NSTEMI (non-ST elevated myocardial infarction) 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)    Coronary artery disease involving native coronary artery of native heart    Stage 3a chronic kidney disease [facility])    Chronic atrial fibrillation [facility])  Resolved Problems:    * No resolved hospital problems. *      Admission Condition:  fair     Discharged Condition: good    Hospital Stay:     Hospital Course:   Rajiv Bryson is a 68 y.o. male with a past medical history of HFrEF (last EF 25%), atrial fibrillation, CKD Stage 3a and hyperlipidemia who presented to the emergency department on 2022 daily progress note from day of discharge. Discharge plan:     Disposition: To a non-[facility] facility    Physician Follow Up:     [facility]  [address]  [phone]         Follow-up with your PCP as needed. Requiring Further Evaluation/Follow Up POST HOSPITALIZATION/Incidental Findings: None. Diet: cardiac diet    Activity: As tolerated    Instructions to Patient: Start taking Flomax for urinary retention. Continue Lasix 40 mg daily. Discharge Medications:      Medication List        START taking these medications      tamsulosin 0.4 MG capsule  Commonly known as: FLOMAX  Take 1 capsule by mouth nightly            CONTINUE taking these medications      acetaminophen 325 MG tablet  Commonly known as: TYLENOL     albuterol sulfate  (90 Base) MCG/ACT inhaler  Commonly known as: PROVENTIL;VENTOLIN;PROAIR     apixaban 5 MG Tabs tablet  Commonly known as: ELIQUIS     chlorhexidine 4 % external liquid  Commonly known as: HIBICLENS     furosemide 40 MG tablet  Commonly known as: LASIX  Take 1 tablet by mouth in the morning. lisinopril 2.5 MG tablet  Commonly known as: PRINIVIL;ZESTRIL  Take 1 tablet by mouth in the morning. metoprolol succinate 25 MG extended release tablet  Commonly known as: TOPROL XL  Take 0.5 tablets by mouth in the morning. nitroGLYCERIN 0.4 MG SL tablet  Commonly known as: NITROSTAT     omeprazole 20 MG delayed release capsule  Commonly known as: PRILOSEC     Rosuvastatin Calcium 20 MG Cpsp            STOP taking these medications      ibuprofen 600 MG tablet  Commonly known as: IBU               Where to Get Your Medications        These medications were sent to [address] [place] [address], [address] [place] [address], [address] [address]      Phone: [phone]   tamsulosin 0.4 MG capsule         No discharge procedures on file.     Time Spent on discharge is  20 mins in patient examination, evaluation, counseling as well as medication reconciliation, prescriptions for required medications, discharge plan and follow up. Electronically signed by   Fiorella Zarco MD  7/30/2022  9:01 AM      Thank you Dr. Antwon Baez MD for the opportunity to be involved in this patient's care.

## 2022-07-30 NOTE — PROGRESS NOTES
Report called to MICHAEL at [facility]. All questions answered. Patient discharged with [facility] via stretcher. Report given, all questions answered.

## 2022-08-18 ENCOUNTER — APPOINTMENT (OUTPATIENT)
Dept: GENERAL RADIOLOGY | Age: 77
DRG: 246 | End: 2022-08-18
Payer: MEDICARE

## 2022-08-18 ENCOUNTER — HOSPITAL ENCOUNTER (INPATIENT)
Age: 77
LOS: 5 days | Discharge: NURSING, REHAB OR LONG-TERM CARE | DRG: 246 | End: 2022-08-24
Attending: EMERGENCY MEDICINE | Admitting: HOSPITALIST
Payer: MEDICARE

## 2022-08-18 DIAGNOSIS — R53.83 FATIGUE, UNSPECIFIED TYPE: Primary | ICD-10-CM

## 2022-08-18 DIAGNOSIS — N28.9 RENAL INSUFFICIENCY: ICD-10-CM

## 2022-08-18 DIAGNOSIS — R77.8 ELEVATED TROPONIN: ICD-10-CM

## 2022-08-18 DIAGNOSIS — R19.7 DIARRHEA, UNSPECIFIED TYPE: ICD-10-CM

## 2022-08-18 PROBLEM — R53.1 WEAKNESS: Status: ACTIVE | Noted: 2022-08-18

## 2022-08-18 LAB
ALBUMIN SERPL-MCNC: 3.4 G/DL (ref 3.5–5.2)
ALBUMIN/GLOBULIN RATIO: 0.9 (ref 1–2.5)
ALP BLD-CCNC: 134 U/L (ref 40–129)
ALT SERPL-CCNC: 20 U/L (ref 5–41)
ANION GAP SERPL CALCULATED.3IONS-SCNC: 15 MMOL/L (ref 9–17)
AST SERPL-CCNC: 19 U/L
BILIRUB SERPL-MCNC: 0.68 MG/DL (ref 0.3–1.2)
BUN BLDV-MCNC: 29 MG/DL (ref 8–23)
CALCIUM SERPL-MCNC: 9.3 MG/DL (ref 8.6–10.4)
CHLORIDE BLD-SCNC: 99 MMOL/L (ref 98–107)
CO2: 20 MMOL/L (ref 20–31)
CREAT SERPL-MCNC: 1.45 MG/DL (ref 0.7–1.2)
GFR AFRICAN AMERICAN: 57 ML/MIN
GFR NON-AFRICAN AMERICAN: 47 ML/MIN
GFR SERPL CREATININE-BSD FRML MDRD: ABNORMAL ML/MIN/{1.73_M2}
GLUCOSE BLD-MCNC: 102 MG/DL (ref 70–99)
HCT VFR BLD CALC: 37.1 % (ref 41–53)
HEMOGLOBIN: 11.6 G/DL (ref 13.5–17.5)
LACTIC ACID: 1.3 MMOL/L (ref 0.5–2.2)
MAGNESIUM: 2 MG/DL (ref 1.6–2.6)
MCH RBC QN AUTO: 27.3 PG (ref 26–34)
MCHC RBC AUTO-ENTMCNC: 31.4 G/DL (ref 31–37)
MCV RBC AUTO: 87.1 FL (ref 80–100)
PDW BLD-RTO: 15.6 % (ref 12.5–15.4)
PLATELET # BLD: 340 K/UL (ref 140–450)
PMV BLD AUTO: 8.2 FL (ref 6–12)
POTASSIUM SERPL-SCNC: 4.4 MMOL/L (ref 3.7–5.3)
PRO-BNP: 2281 PG/ML
RBC # BLD: 4.26 M/UL (ref 4.5–5.9)
SARS-COV-2, RAPID: NOT DETECTED
SODIUM BLD-SCNC: 134 MMOL/L (ref 135–144)
SPECIMEN DESCRIPTION: NORMAL
TOTAL PROTEIN: 7 G/DL (ref 6.4–8.3)
TROPONIN, HIGH SENSITIVITY: 51 NG/L (ref 0–22)
TROPONIN, HIGH SENSITIVITY: 61 NG/L (ref 0–22)
WBC # BLD: 7.3 K/UL (ref 3.5–11)

## 2022-08-18 PROCEDURE — 71045 X-RAY EXAM CHEST 1 VIEW: CPT

## 2022-08-18 PROCEDURE — 99285 EMERGENCY DEPT VISIT HI MDM: CPT

## 2022-08-18 PROCEDURE — 80053 COMPREHEN METABOLIC PANEL: CPT

## 2022-08-18 PROCEDURE — G0378 HOSPITAL OBSERVATION PER HR: HCPCS

## 2022-08-18 PROCEDURE — 87040 BLOOD CULTURE FOR BACTERIA: CPT

## 2022-08-18 PROCEDURE — 2580000003 HC RX 258: Performed by: EMERGENCY MEDICINE

## 2022-08-18 PROCEDURE — 96374 THER/PROPH/DIAG INJ IV PUSH: CPT

## 2022-08-18 PROCEDURE — 99223 1ST HOSP IP/OBS HIGH 75: CPT | Performed by: HOSPITALIST

## 2022-08-18 PROCEDURE — 97166 OT EVAL MOD COMPLEX 45 MIN: CPT

## 2022-08-18 PROCEDURE — 87635 SARS-COV-2 COVID-19 AMP PRB: CPT

## 2022-08-18 PROCEDURE — 93005 ELECTROCARDIOGRAM TRACING: CPT | Performed by: HOSPITALIST

## 2022-08-18 PROCEDURE — 93005 ELECTROCARDIOGRAM TRACING: CPT | Performed by: EMERGENCY MEDICINE

## 2022-08-18 PROCEDURE — 2580000003 HC RX 258: Performed by: HOSPITALIST

## 2022-08-18 PROCEDURE — 2500000003 HC RX 250 WO HCPCS: Performed by: EMERGENCY MEDICINE

## 2022-08-18 PROCEDURE — 97535 SELF CARE MNGMENT TRAINING: CPT

## 2022-08-18 PROCEDURE — 83735 ASSAY OF MAGNESIUM: CPT

## 2022-08-18 PROCEDURE — 6370000000 HC RX 637 (ALT 250 FOR IP): Performed by: HOSPITALIST

## 2022-08-18 PROCEDURE — 83880 ASSAY OF NATRIURETIC PEPTIDE: CPT

## 2022-08-18 PROCEDURE — 85027 COMPLETE CBC AUTOMATED: CPT

## 2022-08-18 PROCEDURE — 36415 COLL VENOUS BLD VENIPUNCTURE: CPT

## 2022-08-18 PROCEDURE — 84484 ASSAY OF TROPONIN QUANT: CPT

## 2022-08-18 PROCEDURE — 97162 PT EVAL MOD COMPLEX 30 MIN: CPT

## 2022-08-18 PROCEDURE — 83605 ASSAY OF LACTIC ACID: CPT

## 2022-08-18 RX ORDER — ONDANSETRON 4 MG/1
4 TABLET, ORALLY DISINTEGRATING ORAL EVERY 8 HOURS PRN
Status: DISCONTINUED | OUTPATIENT
Start: 2022-08-18 | End: 2022-08-24 | Stop reason: HOSPADM

## 2022-08-18 RX ORDER — SODIUM CHLORIDE 9 MG/ML
INJECTION, SOLUTION INTRAVENOUS PRN
Status: DISCONTINUED | OUTPATIENT
Start: 2022-08-18 | End: 2022-08-24 | Stop reason: HOSPADM

## 2022-08-18 RX ORDER — METOPROLOL TARTRATE 5 MG/5ML
5 INJECTION INTRAVENOUS ONCE
Status: COMPLETED | OUTPATIENT
Start: 2022-08-18 | End: 2022-08-18

## 2022-08-18 RX ORDER — LISINOPRIL 2.5 MG/1
2.5 TABLET ORAL DAILY
Status: DISCONTINUED | OUTPATIENT
Start: 2022-08-19 | End: 2022-08-24 | Stop reason: HOSPADM

## 2022-08-18 RX ORDER — METOPROLOL SUCCINATE 25 MG/1
12.5 TABLET, EXTENDED RELEASE ORAL DAILY
Status: DISCONTINUED | OUTPATIENT
Start: 2022-08-18 | End: 2022-08-19

## 2022-08-18 RX ORDER — ACETAMINOPHEN 325 MG/1
650 TABLET ORAL EVERY 6 HOURS PRN
Status: DISCONTINUED | OUTPATIENT
Start: 2022-08-18 | End: 2022-08-24 | Stop reason: HOSPADM

## 2022-08-18 RX ORDER — LACTOBACILLUS RHAMNOSUS GG 10B CELL
1 CAPSULE ORAL
Status: DISCONTINUED | OUTPATIENT
Start: 2022-08-19 | End: 2022-08-24 | Stop reason: HOSPADM

## 2022-08-18 RX ORDER — TAMSULOSIN HYDROCHLORIDE 0.4 MG/1
0.4 CAPSULE ORAL NIGHTLY
Status: DISCONTINUED | OUTPATIENT
Start: 2022-08-18 | End: 2022-08-24 | Stop reason: HOSPADM

## 2022-08-18 RX ORDER — FUROSEMIDE 20 MG/1
40 TABLET ORAL DAILY
Status: DISCONTINUED | OUTPATIENT
Start: 2022-08-18 | End: 2022-08-23

## 2022-08-18 RX ORDER — POTASSIUM CHLORIDE 20 MEQ/1
40 TABLET, EXTENDED RELEASE ORAL PRN
Status: DISCONTINUED | OUTPATIENT
Start: 2022-08-18 | End: 2022-08-24 | Stop reason: HOSPADM

## 2022-08-18 RX ORDER — ALBUTEROL SULFATE 90 UG/1
2 AEROSOL, METERED RESPIRATORY (INHALATION) EVERY 6 HOURS PRN
Status: DISCONTINUED | OUTPATIENT
Start: 2022-08-18 | End: 2022-08-24 | Stop reason: HOSPADM

## 2022-08-18 RX ORDER — 0.9 % SODIUM CHLORIDE 0.9 %
250 INTRAVENOUS SOLUTION INTRAVENOUS ONCE
Status: COMPLETED | OUTPATIENT
Start: 2022-08-18 | End: 2022-08-18

## 2022-08-18 RX ORDER — SODIUM CHLORIDE 9 MG/ML
INJECTION, SOLUTION INTRAVENOUS CONTINUOUS
Status: DISCONTINUED | OUTPATIENT
Start: 2022-08-18 | End: 2022-08-20

## 2022-08-18 RX ORDER — SODIUM CHLORIDE 0.9 % (FLUSH) 0.9 %
5-40 SYRINGE (ML) INJECTION EVERY 12 HOURS SCHEDULED
Status: DISCONTINUED | OUTPATIENT
Start: 2022-08-18 | End: 2022-08-24 | Stop reason: HOSPADM

## 2022-08-18 RX ORDER — SODIUM CHLORIDE 0.9 % (FLUSH) 0.9 %
10 SYRINGE (ML) INJECTION PRN
Status: DISCONTINUED | OUTPATIENT
Start: 2022-08-18 | End: 2022-08-24 | Stop reason: HOSPADM

## 2022-08-18 RX ORDER — ACETAMINOPHEN 650 MG/1
650 SUPPOSITORY RECTAL EVERY 6 HOURS PRN
Status: DISCONTINUED | OUTPATIENT
Start: 2022-08-18 | End: 2022-08-24 | Stop reason: HOSPADM

## 2022-08-18 RX ORDER — MAGNESIUM SULFATE 1 G/100ML
1000 INJECTION INTRAVENOUS PRN
Status: DISCONTINUED | OUTPATIENT
Start: 2022-08-18 | End: 2022-08-24 | Stop reason: HOSPADM

## 2022-08-18 RX ORDER — POLYETHYLENE GLYCOL 3350 17 G/17G
17 POWDER, FOR SOLUTION ORAL DAILY PRN
Status: DISCONTINUED | OUTPATIENT
Start: 2022-08-18 | End: 2022-08-24 | Stop reason: HOSPADM

## 2022-08-18 RX ORDER — NITROGLYCERIN 0.4 MG/1
0.4 TABLET SUBLINGUAL EVERY 5 MIN PRN
Status: DISCONTINUED | OUTPATIENT
Start: 2022-08-18 | End: 2022-08-24 | Stop reason: HOSPADM

## 2022-08-18 RX ORDER — PANTOPRAZOLE SODIUM 40 MG/1
40 TABLET, DELAYED RELEASE ORAL
Status: DISCONTINUED | OUTPATIENT
Start: 2022-08-19 | End: 2022-08-24 | Stop reason: HOSPADM

## 2022-08-18 RX ORDER — ONDANSETRON 2 MG/ML
4 INJECTION INTRAMUSCULAR; INTRAVENOUS EVERY 6 HOURS PRN
Status: DISCONTINUED | OUTPATIENT
Start: 2022-08-18 | End: 2022-08-24 | Stop reason: HOSPADM

## 2022-08-18 RX ORDER — POTASSIUM CHLORIDE 7.45 MG/ML
10 INJECTION INTRAVENOUS PRN
Status: DISCONTINUED | OUTPATIENT
Start: 2022-08-18 | End: 2022-08-24 | Stop reason: HOSPADM

## 2022-08-18 RX ORDER — ROSUVASTATIN CALCIUM 20 MG/1
20 TABLET, COATED ORAL DAILY
Status: DISCONTINUED | OUTPATIENT
Start: 2022-08-19 | End: 2022-08-24 | Stop reason: HOSPADM

## 2022-08-18 RX ORDER — ATORVASTATIN CALCIUM 40 MG/1
40 TABLET, FILM COATED ORAL DAILY
Status: ON HOLD | COMMUNITY
End: 2022-08-19 | Stop reason: HOSPADM

## 2022-08-18 RX ADMIN — FUROSEMIDE 40 MG: 20 TABLET ORAL at 21:34

## 2022-08-18 RX ADMIN — APIXABAN 5 MG: 5 TABLET, FILM COATED ORAL at 21:35

## 2022-08-18 RX ADMIN — SODIUM CHLORIDE: 9 INJECTION, SOLUTION INTRAVENOUS at 21:33

## 2022-08-18 RX ADMIN — SODIUM CHLORIDE: 9 INJECTION, SOLUTION INTRAVENOUS at 13:55

## 2022-08-18 RX ADMIN — SODIUM CHLORIDE 250 ML: 9 INJECTION, SOLUTION INTRAVENOUS at 18:37

## 2022-08-18 RX ADMIN — SODIUM CHLORIDE, PRESERVATIVE FREE 10 ML: 5 INJECTION INTRAVENOUS at 21:00

## 2022-08-18 RX ADMIN — SODIUM CHLORIDE 250 ML: 9 INJECTION, SOLUTION INTRAVENOUS at 17:23

## 2022-08-18 RX ADMIN — TAMSULOSIN HYDROCHLORIDE 0.4 MG: 0.4 CAPSULE ORAL at 21:34

## 2022-08-18 RX ADMIN — METOPROLOL TARTRATE 5 MG: 5 INJECTION INTRAVENOUS at 17:16

## 2022-08-18 ASSESSMENT — PAIN - FUNCTIONAL ASSESSMENT
PAIN_FUNCTIONAL_ASSESSMENT: NONE - DENIES PAIN

## 2022-08-18 NOTE — PROGRESS NOTES
Occupational Therapy  Facility/Department: [facility]  Occupational Therapy Initial Assessment    Name: Rajiv Bryson  : 1945  MRN: [id number]  Date of Service: 2022    Chief Complaint   Patient presents with    Fatigue     X3-4 days since the diarrhea- pt has been home for  days-prior to that he was @ [facility] after an MI 2022    Diarrhea     X3 days - denies any N/V     Discharge Recommendations:  Patient would benefit from continued therapy after discharge       Patient Diagnosis(es): The primary encounter diagnosis was Fatigue, unspecified type. Diagnoses of Diarrhea, unspecified type, Renal insufficiency, and Elevated troponin were also pertinent to this visit. Past Medical History:  has a past medical history of Abdominal aortic aneurysm without rupture (HCC), Atrial flutter ([address] Chronic atrial fibrillation, unspecified ([address] Chronic kidney disease, Combined systolic and diastolic heart failure ([address] Diverticulosis large intestine w/o perforation or abscess w/bleeding, Hyperlipidemia, Hypotension, Ischemic cardiomyopathy, MI (myocardial infarction) ([address] MS (multiple sclerosis) ([address] Multiple sclerosis ([address] Pacemaker, artificial, and Urinary retention. Past Surgical History:  has a past surgical history that includes pacemaker placement and Coronary angioplasty with stent. Assessment   Performance deficits / Impairments: Decreased functional mobility ; Decreased ADL status; Decreased cognition;Decreased high-level IADLs;Decreased endurance;Decreased fine motor control;Decreased coordination;Decreased strength;Decreased balance;Decreased safe awareness  Assessment: Pt currently limited in performing ADL tasks and functional transfers due to above noted deficits, most significantly decreased BUE coordination, decreased BUE/BLE strength, and decreased activity tolerance.  Pt to benefit from continued therapy services while hospitalized to maximize pt's safety and independence in performing functional tasks. Pt unsafe to return to prior living arrangements based on pt's current functional ability, continued skilled therapy intervention recommended at this time. Prognosis: Good  Decision Making: Medium Complexity  REQUIRES OT FOLLOW-UP: Yes  Activity Tolerance  Activity Tolerance: Patient limited by fatigue      Safety Devices  Type of Devices: Gait belt;Call light within reach; Left in bed;Nurse notified  Restraints  Restraints Initially in Place: No    Plan   Plan  Times per Week: 5-6x/wk  Times per Day: Daily  Current Treatment Recommendations: Balance training, Functional mobility training, Endurance training, Strengthening, Equipment evaluation, education, & procurement, Safety education & training, Patient/Caregiver education & training, Self-Care / ADL, Home management training     Restrictions  Restrictions/Precautions  Restrictions/Precautions: Fall Risk  Required Braces or Orthoses?: No    Subjective   General  Patient assessed for rehabilitation services?: Yes  Family / Caregiver Present: No  General Comment  Comments: RN ok'd for therapy this PM. Pt agreeable to participate in session and pleasant/cooperative throughout. Pt denies pain; pt reports chronic numbness/tingling to bilateral hands/feet at baseline.        Social/Functional History  Social/Functional History  Lives With: Alone  Type of Home: House  Home Layout: One level  Home Access: Ramped entrance  Bathroom Shower/Tub: Tub/Shower unit  Bathroom Toilet: Standard  Bathroom Equipment: Tub transfer bench  Bathroom Accessibility: [place]: Electric scooter  Has the patient had two or more falls in the past year or any fall with injury in the past year?: No  Receives Help From: Personal care attendant (Pt reports private health aid who assists with high level cleaning tasks 2x/month and assists pt with transportation)  ADL Assistance: Independent (Pt reports performing ADL tasks with mod IND using AE/DME prior to recent hospitalization, pt states decreased ability to perform functional transfers to/from toilet, LB ADL tasks, and toileting tasks since discharge to SNF ~2 days ago.)  [address]: Needs assistance  Homemaking Responsibilities: Yes (private health aide assists)  Meal Prep Responsibility: Primary  Laundry Responsibility: Secondary  Cleaning Responsibility: Secondary  Shopping Responsibility: Secondary  Health Care Management: Primary  Ambulation Assistance:  (pt reports use of electric scooter for all functional mobility)  Transfer Assistance: Independent (to perform stand pivot transfers to/from electric scooter)  Active : No  Patient's  Info: [place]  Mode of Transportation: blogfoster (wheelchair accessible [place])  Occupation: Retired  Type of Occupation:   Additional Comments: Above information pertains to pt's home setup and prior level of function prior to recent hospitalization and discharge to SNF. Pt at SNF ~16 days prior to discharging home, pt reports at home ~2 days prior to returning to hospital due to decreased ability to perform functional transfers and ADL tasks. Objective   Vision  Vision: Impaired  Vision Exceptions: Wears glasses for reading  Hearing  Hearing: Exceptions to [facility]  Hearing Exceptions: Hard of hearing/hearing concerns       AROM: Within functional limits (AROM WFL bilateral shoulders/elbows/forearms; Decreased AROM bilateral wrists/hands)  Strength:  (BUE grossly 3+/5 at shoulders/elbows, 1/5 at bilateral wrists, 3-/5 bilateral hand grasp)  Coordination: Grossly decreased, non-functional (BUE FMC/GMC- decreased speed/decreased accuracy)    ADL  Feeding: Increased time to complete;Setup  Grooming: Increased time to complete;Minimal assistance  UE Bathing: Increased time to complete; Moderate assistance  LE Bathing: Increased time to complete;Maximum assistance  UE Dressing: Increased time to complete; Moderate assistance  LE Dressing: Increased time to complete;Maximum assistance  Toileting: Maximum assistance; Increased time to complete       Bed mobility  Supine to Sit: Contact guard assistance (HOB raised ~45*)  Sit to Supine: Contact guard assistance  Scooting: Contact guard assistance  Bed Mobility Comments: Significant increased time/effort to perform    Balance  Sitting Balance: Contact guard assistance (seated EOB ~10 minutes with UE support x1-2 on EOB throughout)    Transfers  Sit to stand: Dependent/Total  Stand to sit: Dependent/Total  Transfer Comments: Pt unable to clear bottom from bed to reach a fully upright position despite max A x2 and 2x trials attempted with mod VCs for proper hand placement/initiation/sequencing and increased time/effort to perform    Cognition  Overall Cognitive Status: Exceptions  Attention Span: Attends with cues to redirect  Safety Judgement: Decreased awareness of need for assistance  Problem Solving: Assistance required to generate solutions;Assistance required to identify errors made;Assistance required to implement solutions  Insights: Decreased awareness of deficits  Initiation: Requires cues for some  Sequencing: Requires cues for some  Orientation  Overall Orientation Status: Within Functional Limits  Orientation Level: Oriented X4        Education Given To: Patient  Education Provided: Role of Therapy;Plan of Care;Equipment;Precautions;Transfer Training (Activity Promotion, Bed Mobility Techniques, Safety with Transfers, Pursed Lip Breathing Techniques)  Education Method: Demonstration;Verbal  Barriers to Learning: Hearing  Education Outcome: Verbalized understanding;Continued education needed          AM-PAC Score   AM-PAC Inpatient Daily Activity Raw Score: 15 (08/18/22 1604)  AM-PAC Inpatient ADL T-Scale Score : 34.69 (08/18/22 1604)  ADL Inpatient CMS 0-100% Score: 56.46 (08/18/22 1604)  ADL Inpatient CMS G-Code Modifier : CK (08/18/22 1604)    Goals  Short Term Goals  Time Frame for Short term goals: 14 visits  Short Term Goal 1: Pt will perform grooming/UB ADL tasks with mod IND using AE/DME PRN  Short Term Goal 2: Pt will perform toileting/LB ADL tasks with CGA and use of AE/DME PRN  Short Term Goal 3: Demo 1+ minute standing tolerance with use of least restrictive AD for increased participation in ADL tasks  Short Term Goal 4: Perform functional transfers with CGA and use of least restrictive AD for increased independence with ADL tasks  Short Term Goal 5:  Independently demo good safety awareness during engagement in all ADLs and functional transfers/functional mobility       Therapy Time   Individual Concurrent Group Co-treatment   Time In 1527         Time Out 1545         Minutes 18         Timed Code Treatment Minutes: 8 Minutes       Fiorella Melgar OTR/L

## 2022-08-18 NOTE — ED NOTES
Pt brought by EMS from home. C/o fatigue & diarrhea x3 days. Pt denies any N/V or CP. Pt left [facility] x2 days ago -pt had an MI in April 2022. RR are easy/unlabored & AOX4.  EMS established an IV & pt received 500 ml of LR on way here to ED     Man Johnson RN  08/18/22 100 Smita Quijano RN  08/18/22 5021

## 2022-08-18 NOTE — ED PROVIDER NOTES
[id number] [facility] ED  [address] [facility] [address]  Phone: [phone]  Fax: [phone]      Pt Name: Berny Mendoza  KJB:[id number]  Altagraciagfzena 1945  Date of evaluation: 8/18/2022      CHIEF COMPLAINT       Chief Complaint   Patient presents with    Fatigue     X3-4 days since the diarrhea- pt has been home for  days-prior to that he was @ [facility] after an MI April 2022    Diarrhea     X3 days - denies any N/V       HISTORY OF PRESENT ILLNESS   59-year-old male presents to the emergency department today complaining of fatigue. It has been going on \"forever. \"  It is unclear exactly why we are here today as opposed to sooner. He tells me that he has felt like this in the past but does not recall what the cause was. He denies any chest pain. Does complain of some shortness of breath and leg swelling but then goes on to say that that is chronic in nature for him. Getting up move around makes it feel worse. Nothing feel better. He reports that he is so fatigued that he has a difficult time even talking. He was recently evaluated at this facility for an NSTEMI. REVIEW OF SYSTEMS     Review of Systems   All other systems reviewed and are negative. PAST MEDICAL HISTORY    has a past medical history of Atrial flutter ([address] Hyperlipidemia, Hypertension, MI (myocardial infarction) ([facility] [address] MS (multiple sclerosis) ([facility] [address] and Pacemaker, artificial.    SURGICAL HISTORY      has a past surgical history that includes pacemaker placement and Coronary angioplasty with stent.     CURRENT MEDICATIONS       Previous Medications    ACETAMINOPHEN (TYLENOL) 325 MG TABLET    Take 650 mg by mouth every 6 hours as needed for Pain    ALBUTEROL SULFATE HFA (PROVENTIL;VENTOLIN;PROAIR) 108 (90 BASE) MCG/ACT INHALER    Inhale 2 puffs into the lungs every 6 hours as needed for Wheezing    APIXABAN (ELIQUIS) 5 MG TABS TABLET    Take by mouth 2 times daily    CHLORHEXIDINE (HIBICLENS) 4 % EXTERNAL LIQUID    Apply topically daily as needed Apply topically daily as needed. FUROSEMIDE (LASIX) 40 MG TABLET    Take 1 tablet by mouth in the morning. LISINOPRIL (PRINIVIL;ZESTRIL) 2.5 MG TABLET    Take 1 tablet by mouth in the morning. METOPROLOL SUCCINATE (TOPROL XL) 25 MG EXTENDED RELEASE TABLET    Take 0.5 tablets by mouth in the morning. NITROGLYCERIN (NITROSTAT) 0.4 MG SL TABLET    Place 0.4 mg under the tongue every 5 minutes as needed for Chest pain up to max of 3 total doses. If no relief after 1 dose, call 911. OMEPRAZOLE (PRILOSEC) 20 MG DELAYED RELEASE CAPSULE    Take 20 mg by mouth Daily    ROSUVASTATIN CALCIUM 20 MG CPSP    Take by mouth Daily    TAMSULOSIN (FLOMAX) 0.4 MG CAPSULE    Take 1 capsule by mouth nightly       ALLERGIES     is allergic to codeine, doxycycline, erythromycin, gammagard [immune globulin], and sulfa antibiotics. FAMILY HISTORY     has no family status information on file. family history is not on file. SOCIAL HISTORY      reports that he has quit smoking. He has never used smokeless tobacco. He reports that he does not drink alcohol and does not use drugs. PHYSICAL EXAM     INITIAL VITALS:  height is 5' 9\" (1.753 m) and weight is 71.2 kg (157 lb). His oral temperature is 98 °F (36.7 °C). His blood pressure is 149/126 (abnormal) and his pulse is 67. His respiration is 12 and oxygen saturation is 97%. Physical Exam  Vitals reviewed. Constitutional:       Appearance: He is well-developed. HENT:      Head: Normocephalic and atraumatic. Eyes:      Conjunctiva/sclera: Conjunctivae normal.      Pupils: Pupils are equal, round, and reactive to light. Neck:      Trachea: No tracheal deviation. Cardiovascular:      Rate and Rhythm: Normal rate and regular rhythm. Pulmonary:      Effort: Pulmonary effort is normal.      Breath sounds: Normal breath sounds. Abdominal:      General: Bowel sounds are normal. There is no distension.       Palpations: Abdomen is soft. Tenderness: There is no abdominal tenderness. Musculoskeletal:         General: No tenderness. Normal range of motion. Cervical back: Normal range of motion and neck supple. Comments: There is swelling to bilateral feet which is nonpitting edema. It appears to be chronic. Skin:     General: Skin is warm and dry. Findings: No rash. Neurological:      Mental Status: He is alert and oriented to person, place, and time. Psychiatric:         Behavior: Behavior normal.         Thought Content: Thought content normal.         Judgment: Judgment normal.         DIFFERENTIAL DIAGNOSIS/ MDM:   While patient does complain of fatigue, he does look tired. He is not toxic otherwise. DIAGNOSTIC RESULTS     EKG:  None    RADIOLOGY:   ECHO Complete 2D W Doppler W Color    Result Date: 7/22/2022  [facility] Transthoracic Echocardiography Report (TTE)  Patient Name Hua Arroyo       Date of Study                 07/22/2022               Newton Rocha   Date of      1945  Gender                        Male  Birth   Age          68 year(s)  Race                             Room Number         Height:                       69 inch, 175.26 cm   Corporate ID [id number]    Weight:                       172 pounds, 78 kg  #   Patient Acct [de-identified]   BSA:           1.94 m^2       BMI:      25.4  #                                                                kg/m^2   MR #         [id number]     Sonographer                   Anabell Morris   Accession #  [id number]  Interpreting Physician        [address] [facility]   Fellow                   Referring Nurse Practitioner   Interpreting             Referring Physician           Nir Monday,  Fellow  Type of Study   TTE procedure:2D Echocardiogram, M-Mode, Doppler, Color Doppler.   Procedure Date Date: 07/22/2022 Start: 07:18 AM Study Location: [facility] Technical Quality: Limited visualization due to poor acoustical window. Indications:Chest pain. History / Tech. Comments: Procedure explained to patient. History of HDL, HTN, MI, multiple sclerosis, former smoker, systolic heart failure, peripheral vascular disease, pacemaker/defibrillator Patient Status: Inpatient Height: 69 inches Weight: 172 pounds BSA: 1.94 m^2 BMI: 25.4 kg/m^2 HR: 72 bpm BP: 105/46 mmHg CONCLUSIONS Summary Technically difficult study. Left ventricle is normal in size and normal left ventricular wall thickness. Global left ventricular systolic function is severely reduced Estimated ejection fraction is 25-30%. Right ventricle is normal in size with reduced function. Mild aortic insufficiency. Mild mitral regurgitation. Mild tricuspid regurgitation. Estimated right ventricular systolic pressure is 13.12 mmHg. Aortic root and ascending aorta are mildly dilated measuring 3.9 cm. IVC Increased diameter, but still has inspiratory variation suggesting upper normal or mildly elevated RA filling pressure (i.e. CVP) . Signature ----------------------------------------------------------------------------  Electronically signed by Zoe Degroot(Sonographer) on 07/22/2022 08:38 AM ---------------------------------------------------------------------------- ----------------------------------------------------------------------------  Electronically signed by Abdi Santo(Interpreting physician) on 07/22/2022  02:27 PM ---------------------------------------------------------------------------- FINDINGS Left Atrium Left atrium is normal in size. Inter-atrial septum is intact with no evidence for an atrial septal defect by color doppler. Left Ventricle Left ventricle is normal in size and normal left ventricular wall thickness. Global left ventricular systolic function is severely reduced Estimated ejection fraction is 25-30%. Right Atrium Right atrium appears normal in size. Pacemaker / ICD lead seen in right atrium.  Right Ventricle Right ventricle is normal in size with reduced function. Pacemaker / ICD lead seen in right ventricle. Mitral Valve Normal mitral valve structure. Mild mitral regurgitation. No mitral stenosis. Aortic Valve Aortic leaflet calcification. Mild aortic insufficiency. No aortic stenosis. Tricuspid Valve The tricuspid valve was not well visualized. Mild tricuspid regurgitation. Estimated right ventricular systolic pressure is 33.74 mmHg. No tricuspid stenosis. Pulmonic Valve Pulmonic valve not well visualized but Doppler velocities are normal. No evidence of pulmonic stenosis. Pericardial Effusion No significant pericardial effusion is seen. Pleural Effusion No pleural effusion seen. Miscellaneous Aortic root and ascending aorta are mildly dilated measuring 3.9 cm. IVC Increased diameter, but still has inspiratory variation suggesting upper normal or mildly elevated RA filling pressure (i.e. CVP) .  E/E' average = 15.95. M-mode / 2D Measurements & Calculations:   LVIDd:5.5 cm(3.7 - 5.6 cm)       Diastolic HNCTJI:052 ml  ESJDB:0.3 cm(2.2 - 4.0 cm)       Systolic RQBGOZ:30.8 ml  VWCY:8.1 cm(0.6 - 1.1 cm)        Aortic Root:3.9 cm(2.0 - 3.7 cm)  LVPWd:1.1 cm(0.6 - 1.1 cm)       LA Dimension: 3.8 cm(1.9 - 4.0 cm)  Fractional Shortenin.36 %    LA volume/Index: 64.8 ml /33m^2  Calculated LVEF (%): 36.17 %     LVOT:2.4 cm                                   RVDd:4.1 cm   Mitral:                                  Aortic   Valve Area (P1/2-Time): 2.82 cm^2        Peak Velocity: 1.59 m/s  Peak E-Wave: 0.80 m/s                    Mean Velocity: 1.09 m/s  Peak A-Wave: 0.81 m/s                    Peak Gradient: 10.11 mmHg  E/A Ratio: 0.99                          Mean Gradient: 6 mmHg  Peak Gradient: 2.56 mmHg  Deceleration Time: 267 msec  P1/2t: 78 msec                           Area (continuity): 1.72 cm^2                                           AV VTI: 32.9 cm   Tricuspid:                               Pulmonic:   Peak TR Velocity: 2.39 m/s Peak Velocity: 0.78 m/s  Peak TR Gradient: 22.8484 mmHg           Peak Gradient: 2.43 mmHg  Estimated RA Pressure: 10 mmHg                                            Estimated PASP: 32.85 mmHg  Septal Wall E' velocity:0.05 m/s Lateral Wall E' velocity:0.06 m/s    XR CHEST PORTABLE    Result Date: 7/27/2022  EXAMINATION: ONE XRAY VIEW OF THE CHEST 7/27/2022 11:37 am COMPARISON: 07/21/2022 HISTORY: ORDERING SYSTEM PROVIDED HISTORY: shortness of breath TECHNOLOGIST PROVIDED HISTORY: shortness of breath Reason for Exam: shortness of breath Additional signs and symptoms: Pt c/o weakness and fatigue. FINDINGS: Transvenous pacer remains in place. The lungs are without acute focal process. There is no effusion or pneumothorax. The cardiomediastinal silhouette is stable. The osseous structures are stable. No acute process. Stable cardiomegaly     XR CHEST PORTABLE    Result Date: 7/21/2022  EXAMINATION: ONE XRAY VIEW OF THE CHEST 7/21/2022 12:35 pm COMPARISON: Chest x-ray dated 29 January 2016 HISTORY: ORDERING SYSTEM PROVIDED HISTORY: cp TECHNOLOGIST PROVIDED HISTORY: cp Reason for Exam: chest pain FINDINGS: Left-sided ICD with leads in proper position. Mild cardiomegaly. Patchy left-sided airspace opacities. No pneumothorax or pleural effusion. 1.  Patchy left lower lobe airspace opacities. This could represent pneumonia in the appropriate clinical setting. 2.  Mild cardiomegaly with left-sided ICD     CTA CHEST ABDOMEN PELVIS W CONTRAST    Result Date: 7/24/2022  EXAMINATION: CTA OF THE CHEST AND ABDOMEN WITH CONTRAST 7/21/2022 12:56 pm : TECHNIQUE: CTA of the chest and abdomen was performed after the administration of intravenous contrast.  Multiplanar reformatted images are provided for review. MIP images are provided for review. Automated exposure control, iterative reconstruction, and/or weight based adjustment of the mA/kV was utilized to reduce the radiation dose to as low as reasonably achievable. COMPARISON: None. HISTORY: ORDERING SYSTEM PROVIDED HISTORY: chest pain, hypotension, rule out aortic dissection TECHNOLOGIST PROVIDED HISTORY: chest pain, hypotension, rule out aortic dissection Decision Support Exception - unselect if not a suspected or confirmed emergency medical condition->Emergency Medical Condition (MA) Reason for Exam: chest pain FINDINGS: Chest: Vascular: The thoracic aorta and proximal great vessels are patent and of normal caliber, without evidence of aneurysm, stenosis, or acute abnormality. No mediastinal hematoma. The central pulmonary arteries are patent and free of embolism as are the segmental and visualized subsegmental pulmonary artery branch vessels. Mediastinum: The heart  is enlarged. Left transvenous ICD in place . No pericardial effusion. No enlarged or suspicious axillary, hilar, or mediastinal lymphadenopathy. Lungs/pleura: The trachea and mainstem bronchi are widely patent. Minimal atelectasis is present at both lung bases  the lungs are  otherwise  clear. No discrete pulmonary nodule or mass. No pleural effusion or pneumothorax. Soft Tissues/Bones: Degenerative changes are present throughout the thoracic spine. No acute fracture. Abdomen/Pelvis: Vascular: The abdominal aorta is patent with diffuse atherosclerotic disease throughout and moderate luminal narrowing in the proximal infrarenal segment, beyond which is a fusiform abdominal aortic aneurysm measuring 3-cm in AP diameter. No acute aortic abnormality. The common iliac arteries are patent and mildly ectatic, without discrete aneurysm. The internal iliac, left external iliac, and bilateral common femoral arteries are patent, inclusive of a left external iliac artery stent. The right external iliac artery is chronically occluded throughout the majority of its course. The deep femoral arteries are patent bilaterally, at least the visualized components.   The superficial femoral arteries are bilaterally occluded, although are only partially imaged. The celiac, superior mesenteric, inferior mesenteric, and right renal arteries are patent without significant stenosis or vessel irregularity. 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 The smaller accessory left renal artery providing perfusion to the upper pole of the left kidney is patent although small in caliber. Organs: The liver, spleen, pancreas, gallbladder, adrenal glands, and right kidney appear normal.  The left kidney is largely atrophic, but for the upper pole component which is perfuse separately. No acute renal abnormality. GI/Bowel:  Scattered diverticula are present throughout the sigmoid colon. The stomach and the small and large bowel loops are  otherwise  normal in caliber, contour, and morphology, without acute abnormality. No dilated loops or areas of bowel wall thickening. Peritoneum/Retroperitoneum: There is no free fluid or extraluminal gas. No enlarged or suspicious mesenteric or retroperitoneal lymphadenopathy. Pelvis: No pelvic free fluid or enlarged or suspicious pelvic or inguinal lymphadenopathy. The prostate gland is moderately enlarged. The urinary bladder and the pelvic bowel loops are unremarkable. Bones/Soft Tissues: Degenerative changes are present throughout the lumbar spine and hips. No acute fracture. No evidence of aortic dissection or thoracic aortic aneurysm. Infrarenal abdominal aortic aneurysm, measuring 3-cm. No acute aortic abnormality. No central pulmonary embolism. Chronically occluded right external iliac artery. The visualized bilateral superficial arteries are occluded as well, likely on a chronic basis. Minimal bibasilar atelectasis. Otherwise, clear lungs. Cardiomegaly. 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 The upper left renal pole is viable, perfused separately VA smaller accessory branch. Sigmoid diverticulosis.   Enlarged prostate gland.  No acute findings within the abdomen or pelvis. RECOMMENDATIONS: 3 cm infrarenal abdominal aortic aneurysm. Recommend follow-up every 3 years. Reference: J Am John Radiol 4865;59:167-180. CTA CHEST ABDOMEN W CONTRAST    Result Date: 7/21/2022  EXAMINATION: CTA OF THE CHEST AND ABDOMEN WITH CONTRAST 7/21/2022 12:56 pm: TECHNIQUE: CTA of the chest and abdomen was performed after the administration of intravenous contrast.  Multiplanar reformatted images are provided for review. MIP images are provided for review. Automated exposure control, iterative reconstruction, and/or weight based adjustment of the mA/kV was utilized to reduce the radiation dose to as low as reasonably achievable. COMPARISON: None. HISTORY: ORDERING SYSTEM PROVIDED HISTORY: chest pain, hypotension, rule out aortic dissection TECHNOLOGIST PROVIDED HISTORY: chest pain, hypotension, rule out aortic dissection Decision Support Exception - unselect if not a suspected or confirmed emergency medical condition->Emergency Medical Condition (MA) Reason for Exam: chest pain FINDINGS: Chest: Vascular: The thoracic aorta and proximal great vessels are patent and of normal caliber, without evidence of aneurysm, stenosis, or acute abnormality. No mediastinal hematoma. The central pulmonary arteries are patent and free of embolism as are the segmental and visualized subsegmental pulmonary artery branch vessels. Mediastinum: The heart is enlarged. Left transvenous ICD in place. No pericardial effusion. No enlarged or suspicious axillary, hilar, or mediastinal lymphadenopathy. Lungs/pleura: The trachea and mainstem bronchi are widely patent. Minimal atelectasis is present at both lung bases the lungs are otherwise clear. No discrete pulmonary nodule or mass. No pleural effusion or pneumothorax. Soft Tissues/Bones: Degenerative changes are present throughout the thoracic spine. No acute fracture. Abdomen/Pelvis: Vascular:   The abdominal aorta is patent with diffuse atherosclerotic disease throughout and moderate luminal narrowing in the proximal infrarenal segment, beyond which is a fusiform abdominal aortic aneurysm measuring 3-cm in AP diameter. No acute aortic abnormality. The common iliac arteries are patent and mildly ectatic, without discrete aneurysm. The internal iliac, left external iliac, and bilateral common femoral arteries are patent, inclusive of a left external iliac artery stent. The right external iliac artery is chronically occluded throughout the majority of its course. The deep femoral arteries are patent bilaterally, at least the visualized components. The superficial femoral arteries are bilaterally occluded, although are only partially imaged. The celiac, superior mesenteric, inferior mesenteric, and right renal arteries are patent without significant stenosis or vessel irregularity. 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 The smaller accessory left renal artery providing perfusion to the upper pole of the left kidney is patent although small in caliber. Organs: The liver, spleen, pancreas, gallbladder, adrenal glands, and right kidney appear normal.  The left kidney is largely atrophic, but for the upper pole component which is perfuse separately. No acute renal abnormality. GI/Bowel: Scattered diverticula are present throughout the sigmoid colon. The stomach and the small and large bowel loops are otherwise normal in caliber, contour, and morphology, without acute abnormality. No dilated loops or areas of bowel wall thickening. Peritoneum/Retroperitoneum: There is no free fluid or extraluminal gas. No enlarged or suspicious mesenteric or retroperitoneal lymphadenopathy. Pelvis: No pelvic free fluid or enlarged or suspicious pelvic or inguinal lymphadenopathy. The prostate gland is moderately enlarged. The urinary bladder and the pelvic bowel loops are unremarkable.  Bones/Soft Tissues: Degenerative changes are present throughout the lumbar spine and hips. No acute fracture. No evidence of aortic dissection or thoracic aortic aneurysm. Infrarenal abdominal aortic aneurysm, measuring 3-cm. No acute aortic abnormality. No central pulmonary embolism. Chronically occluded right external iliac artery. The visualized bilateral superficial arteries are occluded as well, likely on a chronic basis. Minimal bibasilar atelectasis. Otherwise, clear lungs. Cardiomegaly. 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 The upper left renal pole is viable, perfused separately VA smaller accessory branch. Sigmoid diverticulosis. Enlarged prostate gland. No acute findings within the abdomen or pelvis. RECOMMENDATIONS: 3 cm infrarenal abdominal aortic aneurysm. Recommend follow-up every 3 years. Reference: J Am John Radiol 6258;80:567-881. LABS:  No results found for this visit on 08/18/22. EMERGENCY DEPARTMENT COURSE:     Thepatient was given the following medications:  No orders of the defined types were placed in this encounter. Vitals:    Vitals:    08/18/22 1200   BP: (!) 149/126   Pulse: 67   Resp: 12   Temp: 98 °F (36.7 °C)   TempSrc: Oral   SpO2: 97%   Weight: 71.2 kg (157 lb)   Height: 5' 9\" (1.753 m)     -------------------------  BP: (!) 149/126, Temp: 98 °F (36.7 °C), Heart Rate: 67, Resp: 12      Re-evaluation Notes  I have endorsed patient by relieving provider. Please see his addendum.     CONSULTS:  None    CRITICAL CARE:   None    PROCEDURES:  None  (Please note that portions of this note were completed with a voice recognitionprogram.  Efforts were made to edit the dictations but occasionally words are mis-transcribed.)    Jaki Woody MD MD, F.A.C.E.P, F.A.A.E.M  Emergency Physician Attending         Jaki Woody MD  08/21/22 0271

## 2022-08-18 NOTE — CARE COORDINATION
SW consulted. Patient reports to RN concerns of living alone and completing ADLs. SW notified RN that could assess patient in-person around 3pm. RN states patient may be discharged before that time. Patient agreeable to phone assessment. SW attempted to call patient's cell, voicemail left requesting return call. SW also left message for [facility]  Adryan Nolasco ([phone]) requesting return call. [address] spoke with patient who reports he is too weak to remain at home alone. Reports fatigue has gotten worse, unable to complete ADLs. Patient discussed linkage to [facility] was working on establishing services with visiting kiran. Patient would like home service if they can provide support overnight. SW discussed typical services provided by home care and may offer overnight support at [facility] cost. Patient mentioned [facility] had also discussed [employer], but patient is unsure he wants to pursue location. Would prefer closer to his home. Will notify CM of information to follow up.

## 2022-08-18 NOTE — ED NOTES
Reached out to Leobardo Soulier (SW) at [phone] in regards to ECF/SNF placement potential for today.  L/M- asked to have her call back as Dr Efrem Pickering could admit this pt if needed     Cathy Oviedo RN  08/18/22 6631

## 2022-08-18 NOTE — ED PROVIDER NOTES
[id number] [facility] ED  [address] [place] [address]  Phone: [phone]  Fax: [phone]        ADDENDUM:      Care of this patient was assumed from Dr. Keshia Mooney. The patient was seen for Fatigue (X3-4 days since the diarrhea- pt has been home for  days-prior to that [place] [facility] [place]) and Diarrhea (X3 days - denies any N/V)  . The patient's initial evaluation and plan have been discussed with the prior provider who initially evaluated the patient. Nursing Notes, Past Medical Hx, Past Surgical Hx, Allergies, were all reviewed. PAST MEDICAL HISTORY    has a past medical history of Abdominal aortic aneurysm without rupture ([address] Atrial flutter ([address] Chronic atrial fibrillation, unspecified ([address] Chronic kidney disease, Combined systolic and diastolic heart failure ([address] Diverticulosis large intestine w/o perforation or abscess w/bleeding, Hyperlipidemia, Hypotension, Ischemic cardiomyopathy, MI (myocardial infarction) ([address] MS (multiple sclerosis) ([address] Multiple sclerosis ([address] Pacemaker, artificial, and Urinary retention. SURGICAL HISTORY      has a past surgical history that includes pacemaker placement and Coronary angioplasty with stent. CURRENT MEDICATIONS       Previous Medications    ACETAMINOPHEN (TYLENOL) 325 MG TABLET    Take 650 mg by mouth every 6 hours as needed for Pain    ALBUTEROL SULFATE HFA (PROVENTIL;VENTOLIN;PROAIR) 108 (90 BASE) MCG/ACT INHALER    Inhale 2 puffs into the lungs every 6 hours as needed for Wheezing    APIXABAN (ELIQUIS) 5 MG TABS TABLET    Take by mouth 2 times daily    ATORVASTATIN (LIPITOR) 40 MG TABLET    Take 40 mg by mouth daily    CHLORHEXIDINE (HIBICLENS) 4 % EXTERNAL LIQUID    Apply topically daily as needed Apply topically daily as needed. FUROSEMIDE (LASIX) 40 MG TABLET    Take 1 tablet by mouth in the morning. LISINOPRIL (PRINIVIL;ZESTRIL) 2.5 MG TABLET    Take 1 tablet by mouth in the morning. METOPROLOL SUCCINATE (TOPROL XL) 25 MG EXTENDED RELEASE TABLET    Take 0.5 tablets by mouth in the morning. NITROGLYCERIN (NITROSTAT) 0.4 MG SL TABLET    Place 0.4 mg under the tongue every 5 minutes as needed for Chest pain up to max of 3 total doses. If no relief after 1 dose, call 911. OMEPRAZOLE (PRILOSEC) 20 MG DELAYED RELEASE CAPSULE    Take 20 mg by mouth Daily    ROSUVASTATIN CALCIUM 20 MG CPSP    Take by mouth Daily    TAMSULOSIN (FLOMAX) 0.4 MG CAPSULE    Take 1 capsule by mouth nightly       ALLERGIES     is allergic to codeine, doxycycline, erythromycin, gammagard [immune globulin], and sulfa antibiotics. Diagnostic Results     EKG: All EKG's are interpreted by the Emergency Department Physician who either signs or Co-signs this chart in the [address] a cardiologist.      Interpreted by Hosea Diaz MD     Rhythm: Ventricular paced rhythm  Rate: 90  Ectopy: Occasional PVC      Clinical Impression: Ventricular paced rhythm with a rate of 90      RADIOLOGY:        Interpretation per the Radiologist below, if available at the time of this note:    XR CHEST PORTABLE    Result Date: 8/18/2022  EXAMINATION: ONE XRAY VIEW OF THE CHEST 8/18/2022 12:24 pm COMPARISON: 07/27/2022 HISTORY: ORDERING SYSTEM PROVIDED HISTORY: shortness of breath TECHNOLOGIST PROVIDED HISTORY: shortness of breath Reason for Exam: Fatigue, dyspnea Relevant Medical/Surgical History: hx MI, pacemaker FINDINGS: The lungs are without acute focal process. There is no effusion or pneumothorax. The cardiomediastinal silhouette is without acute process. The osseous structures are without acute process. No acute process.        LABS:   Results for orders placed or performed during the hospital encounter of 08/18/22   CBC   Result Value Ref Range    WBC 7.3 3.5 - 11.0 k/uL    RBC 4.26 (L) 4.5 - 5.9 m/uL    Hemoglobin 11.6 (L) 13.5 - 17.5 g/dL    Hematocrit 37.1 (L) 41 - 53 %    MCV 87.1 80 - 100 fL    MCH 27.3 26 - 34 pg    MCHC 31.4 31 - 37 g/dL    RDW 15.6 (H) 12.5 - 15.4 %    Platelets 147 565 - 518 k/uL    MPV 8.2 6.0 - 12.0 fL   Comprehensive Metabolic Panel   Result Value Ref Range    Glucose 102 (H) 70 - 99 mg/dL    BUN 29 (H) 8 - 23 mg/dL    Creatinine 1.45 (H) 0.70 - 1.20 mg/dL    Calcium 9.3 8.6 - 10.4 mg/dL    Sodium 134 (L) 135 - 144 mmol/L    Potassium 4.4 3.7 - 5.3 mmol/L    Chloride 99 98 - 107 mmol/L    CO2 20 20 - 31 mmol/L    Anion Gap 15 9 - 17 mmol/L    Alkaline Phosphatase 134 (H) 40 - 129 U/L    ALT 20 5 - 41 U/L    AST 19 <40 U/L    Total Bilirubin 0.68 0.3 - 1.2 mg/dL    Total Protein 7.0 6.4 - 8.3 g/dL    Albumin 3.4 (L) 3.5 - 5.2 g/dL    Albumin/Globulin Ratio 0.9 (L) 1.0 - 2.5    GFR Non- 47 (L) >60 mL/min    GFR  57 (L) >60 mL/min    GFR Comment         Troponin   Result Value Ref Range    Troponin, High Sensitivity 61 (HH) 0 - 22 ng/L   Brain Natriuretic Peptide   Result Value Ref Range    Pro-BNP 2,281 (H) <300 pg/mL   Lactic Acid   Result Value Ref Range    Lactic Acid 1.3 0.5 - 2.2 mmol/L       RADIOLOGY:  XR CHEST PORTABLE   Final Result   No acute process.              RECENT VITALS:  BP: 115/76, Temp: 98 °F (36.7 °C), Heart Rate: 82, Resp: 16     ED Course     The patient was given the following medications:  Orders Placed This Encounter   Medications    0.9 % sodium chloride infusion       Medical Decision Making           Is noted to have some chronic renal insufficiency which is known for him although his kidney function is slightly worse than normal he has an elevation in his high-sensitivity troponin which she has had previously also has an elevation in his BNP the patient states he lives at home alone does not feel safe going home we did have  evaluate the patient given the multiple abnormal labs the diarrhea fatigue I do feel that he could benefit from admission for further treatment and possible placement    EMERGENCY DEPARTMENT COURSE:   Vitals: Vitals:    08/18/22 1200 08/18/22 1324   BP: (!) 149/126 115/76   Pulse: 67 82   Resp: 12 16   Temp: 98 °F (36.7 °C)    TempSrc: Oral    SpO2: 97% 97%   Weight: 71.2 kg (157 lb)    Height: 5' 9\" (1.753 m)      -------------------------  BP: 115/76, Temp: 98 °F (36.7 °C), Heart Rate: 82, Resp: 16          CONSULTS:  []    PROCEDURES:  None    FINAL IMPRESSION      1. Fatigue, unspecified type    2. Diarrhea, unspecified type    3. Renal insufficiency    4. Elevated troponin          DISPOSITION/PLAN   DISPOSITION Decision To Admit 08/18/2022 02:18:57 PM      CONDITION ON DISPOSITION:   Stable    PATIENT REFERRED TO:  No follow-up provider specified.     DISCHARGE MEDICATIONS:  New Prescriptions    No medications on file       (Please note that portions of this note were completed with a voicerecognition program.  Efforts were made to edit the dictations but occasionally words are mis-transcribed.)    Teresita Joseph MD  Attending Emergency Medicine Physician                     Teresita Joseph MD  08/25/22 [id number]

## 2022-08-18 NOTE — ED NOTES
Phoned -she cant get out here till 3p so getting pts cell number      Negrita Early, ONEAL  08/18/22 1198

## 2022-08-18 NOTE — ED PROVIDER NOTES
[id number] [facility] ED  [address] [facility] [address]  Phone: [phone]  Fax: [phone]        ADDENDUM:      Care of this patient was assumed from Dr. Tulio Rm. The patient was seen for Fatigue (X3-4 days since the diarrhea- pt has been home for  days-prior to that he was @ [facility] [place] after an MI April 2022) and Diarrhea (X3 days - denies any N/V)  . The patient's initial evaluation and plan have been discussed with the prior provider who initially evaluated the patient. Nursing Notes, Past Medical Hx, Past Surgical Hx, Allergies, were all reviewed. PAST MEDICAL HISTORY    has a past medical history of Abdominal aortic aneurysm without rupture ([address] Atrial flutter ([address] Chronic atrial fibrillation, unspecified ([address] Chronic kidney disease, Combined systolic and diastolic heart failure ([address] Diverticulosis large intestine w/o perforation or abscess w/bleeding, Hyperlipidemia, Hypotension, Ischemic cardiomyopathy, MI (myocardial infarction) ([address] MS (multiple sclerosis) ([address] Multiple sclerosis ([address] Pacemaker, artificial, and Urinary retention. SURGICAL HISTORY      has a past surgical history that includes pacemaker placement and Coronary angioplasty with stent. CURRENT MEDICATIONS       Previous Medications    ACETAMINOPHEN (TYLENOL) 325 MG TABLET    Take 650 mg by mouth every 6 hours as needed for Pain    ALBUTEROL SULFATE HFA (PROVENTIL;VENTOLIN;PROAIR) 108 (90 BASE) MCG/ACT INHALER    Inhale 2 puffs into the lungs every 6 hours as needed for Wheezing    APIXABAN (ELIQUIS) 5 MG TABS TABLET    Take by mouth 2 times daily    ATORVASTATIN (LIPITOR) 40 MG TABLET    Take 40 mg by mouth daily    CHLORHEXIDINE (HIBICLENS) 4 % EXTERNAL LIQUID    Apply topically daily as needed Apply topically daily as needed. FUROSEMIDE (LASIX) 40 MG TABLET    Take 1 tablet by mouth in the morning.     LISINOPRIL (PRINIVIL;ZESTRIL) 2.5 MG TABLET    Take 1 tablet by mouth in the morning. METOPROLOL SUCCINATE (TOPROL XL) 25 MG EXTENDED RELEASE TABLET    Take 0.5 tablets by mouth in the morning. NITROGLYCERIN (NITROSTAT) 0.4 MG SL TABLET    Place 0.4 mg under the tongue every 5 minutes as needed for Chest pain up to max of 3 total doses. If no relief after 1 dose, call 911. OMEPRAZOLE (PRILOSEC) 20 MG DELAYED RELEASE CAPSULE    Take 20 mg by mouth Daily    ROSUVASTATIN CALCIUM 20 MG CPSP    Take by mouth Daily    TAMSULOSIN (FLOMAX) 0.4 MG CAPSULE    Take 1 capsule by mouth nightly       ALLERGIES     is allergic to codeine, doxycycline, erythromycin, gammagard [immune globulin], and sulfa antibiotics. Diagnostic Results       LABS:   Results for orders placed or performed during the hospital encounter of 08/18/22   COVID-19, Rapid    Specimen: Nasopharyngeal Swab   Result Value Ref Range    Specimen Description . NASOPHARYNGEAL SWAB     SARS-CoV-2, Rapid Not Detected Not Detected   CBC   Result Value Ref Range    WBC 7.3 3.5 - 11.0 k/uL    RBC 4.26 (L) 4.5 - 5.9 m/uL    Hemoglobin 11.6 (L) 13.5 - 17.5 g/dL    Hematocrit 37.1 (L) 41 - 53 %    MCV 87.1 80 - 100 fL    MCH 27.3 26 - 34 pg    MCHC 31.4 31 - 37 g/dL    RDW 15.6 (H) 12.5 - 15.4 %    Platelets 188 749 - 152 k/uL    MPV 8.2 6.0 - 12.0 fL   Comprehensive Metabolic Panel   Result Value Ref Range    Glucose 102 (H) 70 - 99 mg/dL    BUN 29 (H) 8 - 23 mg/dL    Creatinine 1.45 (H) 0.70 - 1.20 mg/dL    Calcium 9.3 8.6 - 10.4 mg/dL    Sodium 134 (L) 135 - 144 mmol/L    Potassium 4.4 3.7 - 5.3 mmol/L    Chloride 99 98 - 107 mmol/L    CO2 20 20 - 31 mmol/L    Anion Gap 15 9 - 17 mmol/L    Alkaline Phosphatase 134 (H) 40 - 129 U/L    ALT 20 5 - 41 U/L    AST 19 <40 U/L    Total Bilirubin 0.68 0.3 - 1.2 mg/dL    Total Protein 7.0 6.4 - 8.3 g/dL    Albumin 3.4 (L) 3.5 - 5.2 g/dL    Albumin/Globulin Ratio 0.9 (L) 1.0 - 2.5    GFR Non- 47 (L) >60 mL/min    GFR  57 (L) >60 mL/min    GFR Comment Troponin   Result Value Ref Range    Troponin, High Sensitivity 61 (HH) 0 - 22 ng/L   Brain Natriuretic Peptide   Result Value Ref Range    Pro-BNP 2,281 (H) <300 pg/mL   Lactic Acid   Result Value Ref Range    Lactic Acid 1.3 0.5 - 2.2 mmol/L       RADIOLOGY:  XR CHEST PORTABLE   Final Result   No acute process. RECENT VITALS:  BP: (!) 131/96, Temp: 97.9 °F (36.6 °C), Heart Rate: 87, Resp: 18     ED Course     The patient was given the following medications:  Orders Placed This Encounter   Medications    0.9 % sodium chloride infusion       Medical Decision Making           Patient was signed out to me pending transfer to a skilled nursing facility, however, we were unable to make that happen secondary to logistical reasons and the patient was also having unexplained runs of monomorphic V. tach, I did feel based on this it might be better to admit for further evaluation and treatment considering the primary presenting issue was weakness and fatigue which could be explained by cardiac issues. I spoke with the hospitalist, he is amenable to the plan, patient is amenable to the plan and he will be admitted for further evaluation and treatment    Based on his run of V. tach I did order a twelve-lead EKG  Twelve lead EKG interpreted by myself:  A 12 lead EKG done at 1645, interpreted by myself, showed a atrial sensed, ventricular paced rhythm at a rate of 92bpm.  The MI interval was normal.  The QRS complex was abnormal.  There are ST segment changes and T wave changes consistent with a ventricularly paced rhythm.   There is no modified scar Bosa criteria for acute MI interpretation: Atrial sensed, ventricular paced rhythm without any scar Bosa criteria for acute MI    EMERGENCY DEPARTMENT COURSE:   Vitals:    Vitals:    08/18/22 1324 08/18/22 1430 08/18/22 1530 08/18/22 1537   BP: 115/76 117/65 105/73 (!) 131/96   Pulse: 82 75 80 87   Resp: 16 16 17 18   Temp:  97.9 °F (36.6 °C)     TempSrc:  Oral SpO2: 97% 98%  95%   Weight:       Height:         -------------------------  BP: (!) 131/96, Temp: 97.9 °F (36.6 °C), Heart Rate: 87, Resp: 18      RE-EVALUATION:  Stable    PROCEDURES:  None    FINAL IMPRESSION      1. Fatigue, unspecified type    2. Diarrhea, unspecified type    3. Renal insufficiency    4. Elevated troponin          DISPOSITION/PLAN   DISPOSITION Admitted 08/18/2022 04:37:14 PM      CONDITION ON DISPOSITION:   Stable    PATIENT REFERRED TO:  No follow-up provider specified.     DISCHARGE MEDICATIONS:  New Prescriptions    No medications on file       (Please note that portions of this note were completed with a voicerecognition program.  Efforts were made to edit the dictations but occasionally words are mis-transcribed.)    Ivonne Claude, DO  Attending Emergency Medicine Physician                      Ivonne Claude,   08/18/22 4835 [address], DO  08/18/22 7424

## 2022-08-18 NOTE — ED NOTES
Phoned - Galina Gaspar back- relayed to her pts cell # & the [place] [facility] # so they can collaborate  Galina Gaspar will be calling pt     Marzena Del Real RN  08/18/22 5137

## 2022-08-18 NOTE — ED NOTES
Pt requests to speak w/ a -pt is concerned about living alone & ADL     Levi Primrose, ONEAL  08/18/22 3063

## 2022-08-18 NOTE — H&P
[facility]  Office: [address], DO, Ingris Melton, DO, Duane Owen, DO, Poppy Varsha Serrato, DO, Loreta Qiu MD, Bogdan Alexander MD, Jaycob Cole MD, Valerie Srinivasan MD,  Coco Faria MD, Jeanne Shay MD, Power Matamoros, DO, Hosea Benito MD,  Mino Ruby MD, Lieutenant Sabas MD, Cecily Zuniga DO, Adair Toscano MD, Erinn Melton MD, Nikhil Munoz MD, Rey Rudolph DO, Saige Cazares MD, Mendoza Barnett MD, Demetrius Shafer, CNP,  Lenny Centeno, CNP, Jossie Wiggins, CNP, David Carranza, CNP, Nolberto Meraz PA-C, Maria Luisa Guzman, [facility], Russell Orellana, CNP, Prince Don, CNP, Shannan Chu, CNP, Teresita Zavala, CNP, Aparna Hogan, CNP, Adrianne Reddy, CNS, Arjun Handy, [facility], Collin Sanches, CNP, Nikia Spears, CNP, Bubba Sandy, [facility]   [address] [facility]    HISTORY AND PHYSICAL EXAMINATION            Date:   8/18/2022  Patient name:  Skyler Verduzco  Date of admission:  8/18/2022 11:58 AM  MRN:   [id number]  Account:  [de-identified]  YOB: 1945  PCP:    Robert Redding MD  Room:   ER06/ER06  Code Status:    Prior    Chief Complaint:     Chief Complaint   Patient presents with    Fatigue     X3-4 days since the diarrhea- pt has been home for  days-prior to that he was @ [facility]. after an MI April 2022    Diarrhea     X3 days - denies any N/V     This very pleasant 49-year-old male presents the hospital with generalized weakness and fatigue, patient states \"I do not feel well\"    History Obtained From:     patient, electronic medical record    History of Present Illness:     Skyler Verduzco is a 68 y.o.  Non- / non  male who presents with Fatigue (X3-4 days since the diarrhea- pt has been home for  days-prior to that he was @ [facility]. after an MI April 2022) and Diarrhea (X3 days - denies any N/V)   and is admitted to the hospital for the management of Laterality Date    CORONARY ANGIOPLASTY WITH STENT PLACEMENT      PACEMAKER PLACEMENT          Medications Prior to Admission:     Prior to Admission medications    Medication Sig Start Date End Date Taking? Authorizing Provider   atorvastatin (LIPITOR) 40 MG tablet Take 40 mg by mouth daily   Yes Historical Provider, MD   tamsulosin (FLOMAX) 0.4 MG capsule Take 1 capsule by mouth nightly 7/30/22   Girish Haider MD   furosemide (LASIX) 40 MG tablet Take 1 tablet by mouth in the morning. 7/23/22 8/22/22  Tracey Gipson DO   lisinopril (PRINIVIL;ZESTRIL) 2.5 MG tablet Take 1 tablet by mouth in the morning. Patient taking differently: Take 2.5 mg by mouth Daily Hold for BP less than 110 7/23/22   Tracey Gipson DO   metoprolol succinate (TOPROL XL) 25 MG extended release tablet Take 0.5 tablets by mouth in the morning. 7/23/22   Tracey Gipson DO   apixaban (ELIQUIS) 5 MG TABS tablet Take by mouth 2 times daily    Historical Provider, MD   albuterol sulfate HFA (PROVENTIL;VENTOLIN;PROAIR) 108 (90 Base) MCG/ACT inhaler Inhale 2 puffs into the lungs every 6 hours as needed for Wheezing    Historical Provider, MD   Rosuvastatin Calcium 20 MG CPSP Take by mouth Daily    Historical Provider, MD   nitroGLYCERIN (NITROSTAT) 0.4 MG SL tablet Place 0.4 mg under the tongue every 5 minutes as needed for Chest pain up to max of 3 total doses. If no relief after 1 dose, call 911. Historical Provider, MD   chlorhexidine (HIBICLENS) 4 % external liquid Apply topically daily as needed Apply topically daily as needed.     Historical Provider, MD   acetaminophen (TYLENOL) 325 MG tablet Take 650 mg by mouth every 6 hours as needed for Pain    Historical Provider, MD   omeprazole (PRILOSEC) 20 MG delayed release capsule Take 20 mg by mouth Daily    Historical Provider, MD   ibuprofen (IBU) 600 MG tablet Take 1 tablet by mouth every 6 hours as needed for Pain 6/25/22 7/30/22  Ruthann Valdovinos MD        Allergies: Codeine, Doxycycline, Erythromycin, Gammagard [immune globulin], and Sulfa antibiotics    Social History:     Tobacco:    reports that he has quit smoking. He has never used smokeless tobacco.  Alcohol:      reports no history of alcohol use. Drug Use:  reports no history of drug use. Family History:     History reviewed. No pertinent family history. Review of Systems:     Positive and Negative as described in HPI. CONSTITUTIONAL: Patient reports weakness, fatigue  HEENT:  negative for vision, hearing changes, runny nose, throat pain  RESPIRATORY:  negative for shortness of breath, cough, congestion, wheezing  CARDIOVASCULAR:  negative for chest pain, palpitations  GASTROINTESTINAL:  negative for nausea, vomiting, constipation, abdominal pain. Patient reports loose stools over the last couple of days  GENITOURINARY:  negative for difficulty of urination, burning with urination, frequency   INTEGUMENT:  negative for rash, skin lesions, easy bruising   HEMATOLOGIC/LYMPHATIC:  negative for swelling/edema   ALLERGIC/IMMUNOLOGIC:  negative for urticaria , itching  ENDOCRINE:  negative increase in drinking, increase in urination, hot or cold intolerance  MUSCULOSKELETAL:  negative joint pains, muscle aches, swelling of joints  NEUROLOGICAL:  negative for headaches, dizziness, lightheadedness, numbness, pain, tingling extremities  BEHAVIOR/PSYCH:  negative for depression, anxiety    Physical Exam:   BP (!) 104/51   Pulse 87   Temp 97.9 °F (36.6 °C) (Oral)   Resp 14   Ht 5' 9\" (1.753 m)   Wt 157 lb (71.2 kg)   SpO2 96%   BMI 23.18 kg/m²   Temp (24hrs), Av °F (36.7 °C), Min:97.9 °F (36.6 °C), Max:98 °F (36.7 °C)    No results for input(s): POCGLU in the last 72 hours.     Intake/Output Summary (Last 24 hours) at 2022 1825  Last data filed at 2022 1755  Gross per 24 hour   Intake 1050 ml   Output --   Net 1050 ml       General Appearance:  alert, well appearing, and in no acute distress  Mental status: oriented to person, place, and time  Head:  normocephalic, atraumatic  Eye: no icterus, redness, pupils equal and reactive, extraocular eye movements intact, conjunctiva clear  Ear: normal external ear, no discharge, hearing intact  Nose:  no drainage noted  Mouth: mucous membranes moist  Neck: supple, no carotid bruits, thyroid not palpable  Lungs: Bilateral equal air entry, clear to ausculation, no wheezing, rales or rhonchi, normal effort  Cardiovascular: Irregular rhythm, rate controlled   Abdomen: Soft, nontender, nondistended, normal bowel sounds, no hepatomegaly or splenomegaly  Neurologic: There are no new focal motor or sensory deficits, normal muscle tone and bulk, no abnormal sensation, normal speech, cranial nerves II through XII grossly intact  Skin: No gross lesions, rashes, bruising or bleeding on exposed skin area  Extremities:  peripheral pulses palpable, no pedal edema or calf pain with palpation  Psych: normal affect     Investigations:      Laboratory Testing:  Recent Results (from the past 24 hour(s))   CBC    Collection Time: 08/18/22  1:10 PM   Result Value Ref Range    WBC 7.3 3.5 - 11.0 k/uL    RBC 4.26 (L) 4.5 - 5.9 m/uL    Hemoglobin 11.6 (L) 13.5 - 17.5 g/dL    Hematocrit 37.1 (L) 41 - 53 %    MCV 87.1 80 - 100 fL    MCH 27.3 26 - 34 pg    MCHC 31.4 31 - 37 g/dL    RDW 15.6 (H) 12.5 - 15.4 %    Platelets 531 089 - 216 k/uL    MPV 8.2 6.0 - 12.0 fL   Comprehensive Metabolic Panel    Collection Time: 08/18/22  1:10 PM   Result Value Ref Range    Glucose 102 (H) 70 - 99 mg/dL    BUN 29 (H) 8 - 23 mg/dL    Creatinine 1.45 (H) 0.70 - 1.20 mg/dL    Calcium 9.3 8.6 - 10.4 mg/dL    Sodium 134 (L) 135 - 144 mmol/L    Potassium 4.4 3.7 - 5.3 mmol/L    Chloride 99 98 - 107 mmol/L    CO2 20 20 - 31 mmol/L    Anion Gap 15 9 - 17 mmol/L    Alkaline Phosphatase 134 (H) 40 - 129 U/L    ALT 20 5 - 41 U/L    AST 19 <40 U/L    Total Bilirubin 0.68 0.3 - 1.2 mg/dL    Total Protein 7.0 6.4 - 8.3 g/dL    Albumin 3.4 (L) 3.5 - 5.2 g/dL    Albumin/Globulin Ratio 0.9 (L) 1.0 - 2.5    GFR Non- 47 (L) >60 mL/min    GFR  57 (L) >60 mL/min    GFR Comment         Troponin    Collection Time: 08/18/22  1:10 PM   Result Value Ref Range    Troponin, High Sensitivity 61 (HH) 0 - 22 ng/L   Brain Natriuretic Peptide    Collection Time: 08/18/22  1:10 PM   Result Value Ref Range    Pro-BNP 2,281 (H) <300 pg/mL   Lactic Acid    Collection Time: 08/18/22  1:10 PM   Result Value Ref Range    Lactic Acid 1.3 0.5 - 2.2 mmol/L   Blood Culture 1    Collection Time: 08/18/22  1:11 PM    Specimen: Blood   Result Value Ref Range    Specimen Description . BLOOD     Special Requests 5ml right arm     Culture NO GROWTH <24 HRS    Culture, Blood 2    Collection Time: 08/18/22  1:20 PM    Specimen: Blood   Result Value Ref Range    Specimen Description . BLOOD     Special Requests 1ml left arm     Culture NO GROWTH <24 HRS    COVID-19, Rapid    Collection Time: 08/18/22  3:25 PM    Specimen: Nasopharyngeal Swab   Result Value Ref Range    Specimen Description . NASOPHARYNGEAL SWAB     SARS-CoV-2, Rapid Not Detected Not Detected   Troponin    Collection Time: 08/18/22  5:30 PM   Result Value Ref Range    Troponin, High Sensitivity 51 (H) 0 - 22 ng/L       Imaging/Diagnostics:  XR CHEST PORTABLE    Result Date: 8/18/2022  No acute process.        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 Katya Martinez MD

## 2022-08-18 NOTE — PROGRESS NOTES
with Max assist x 2 this date. At current mobility status, pt is unable to return to previous living arrangement and would require 24/7 assistance. Pt would benefit from further therapy. Treatment Diagnosis: impaired mobility  Therapy Prognosis: Good  Decision Making: Medium Complexity  Requires PT Follow-Up: Yes  Activity Tolerance  Activity Tolerance: Patient tolerated evaluation without incident     Plan   Plan  Plan:  (5-6x/wwek)  Current Treatment Recommendations: Strengthening, Balance training, Functional mobility training, Transfer training, Endurance training, Patient/Caregiver education & training, Safety education & training, Therapeutic activities  Safety Devices  Type of Devices: Gait belt, Call light within reach, Left in bed, Nurse notified  Restraints  Restraints Initially in Place: No     Restrictions  Restrictions/Precautions  Restrictions/Precautions: Fall Risk  Required Braces or Orthoses?: No     Subjective   General  Patient assessed for rehabilitation services?: Yes  Family / Caregiver Present: No  Referring Practitioner: Rahul Knight  Referral Date : 08/18/22  Diagnosis: Fatigue, weakness  Follows Commands: Within Functional Limits  Subjective  Subjective: Pt seen in ED for possible placement. Pt denies pain at this time. Pt reports being unable to care for himself at home since being discharged from SNF 2 days ago.          Social/Functional History  Social/Functional History  Lives With: Alone  Type of Home: House  Home Layout: One level  Home Access: Ramped entrance  Bathroom Shower/Tub: Tub/Shower unit  Bathroom Toilet: Standard  Bathroom Equipment: Tub transfer bench  Bathroom Accessibility: [place]: Electric scooter  Has the patient had two or more falls in the past year or any fall with injury in the past year?: No  Receives Help From: Personal care attendant (Pt reports private health aid who assists with high level cleaning tasks 2x/month and assists pt with transportation)  ADL Assistance: Independent (Pt reports performing ADL tasks with mod IND using AE/DME prior to recent hospitalization, pt states decreased ability to perform functional transfers to/from toilet, LB ADL tasks, and toileting tasks since discharge to SNF ~2 days ago.)  [address]: Needs assistance  Homemaking Responsibilities: Yes (private health aide assists)  Meal Prep Responsibility: Primary  Laundry Responsibility: Secondary  Cleaning Responsibility: Secondary  Shopping Responsibility: Secondary  Health Care Management: Primary  Ambulation Assistance:  (pt reports use of electric scooter for all functional mobility)  Transfer Assistance: Independent (to perform stand pivot transfers to/from electric scooter)  Active : No  Patient's  Info: Mala Hamilton  Mode of Transportation: Ana Mendoza (wheelchair accessible Kailash Luevano)  Occupation: Retired  Type of Occupation:   Additional Comments: Above information pertains to pt's home setup and prior level of function prior to recent hospitalization and discharge to SNF. Pt at SNF ~16 days prior to discharging home, pt reports at home ~2 days prior to returning to hospital due to decreased ability to perform functional transfers and ADL tasks.   Vision/Hearing  Vision  Vision: Impaired  Vision Exceptions: Wears glasses for reading  Hearing  Hearing: Exceptions to [facility]   Orientation  Overall Orientation Status: Within Functional Limits  Orientation Level: Oriented X4  Cognition  Overall Cognitive Status: Exceptions     Objective         Gross Assessment  AROM: Generally decreased, functional  PROM: Within functional limits  Strength: Grossly decreased, non-functional  Coordination: Generally decreased, functional  Tone: Abnormal  Sensation: Intact     AROM RLE (degrees)  RLE AROM: Exceptions  RLE General AROM: Ankle DF to neutral  AROM LLE (degrees)  LLE AROM : Exceptions  LLE General AROM: Ankle DF to neutral  Strength RLE  Strength RLE: Exception  R Hip Flexion: 4-/5  R Knee Flexion: 4-/5  R Knee Extension: 3+/5  R Ankle Dorsiflexion: 0/5  Strength LLE  Strength LLE: Exception  L Hip Flexion: 3+/5  L Knee Flexion: 4-/5  L Knee Extension: 4-/5  L Ankle Dorsiflexion: 0/5           Bed mobility  Supine to Sit: Stand by assistance  Sit to Supine: Stand by assistance  Scooting: Stand by assistance  Bed Mobility Comments: Head of bed elevated 45 degrees. Transfers  Sit to Stand: Dependent/Total  Stand to sit: Dependent/Total  Stand Pivot Transfers: Unable to assess  Comment: Sit to stand with RW. Pt unable to complete partial stand with Max assist x 2 using UE's on RW. Patient typically completes stand pivot transfers with electric scooter at home. Ambulation  Comments: Pt is nonambulatory. More Ambulation?: No  Stairs/Curb  Stairs?: No     Balance  Posture: Fair  Sitting - Static: Good  Sitting - Dynamic: Fair;+  Standing - Static: Poor  Standing - Dynamic: Poor           OutComes Score                                                  AM-PAC Score  AM-PAC Inpatient Mobility Raw Score : 10 (08/18/22 1610)  AM-PAC Inpatient T-Scale Score : 32.29 (08/18/22 1610)  Mobility Inpatient CMS 0-100% Score: 76.75 (08/18/22 1610)  Mobility Inpatient CMS G-Code Modifier : CL (08/18/22 1610)          Tinneti Score       Goals  Short Term Goals  Time Frame for Short term goals: 14 visits  Short term goal 1: Pt to be Modified (I) with stand pivot transfers with modifications as needed without LOB. Short term goal 2: Pt to tolerate 30 minutes of therapy activity to improve strength and endurance for mobility. Short term goal 3: Improve standing static/dynamic balance to Fair+ during transfers to minimize fall risk with mobility.   Patient Goals   Patient goals : Return to SNF for further strengthening       Education  Patient Education  Education Given To: Patient  Education Provided: Plan of Care  Education Method: Verbal  Barriers to Learning: None  Education Outcome: Verbalized understanding      Therapy Time   Individual Concurrent Group Co-treatment   Time In 1525         Time Out 1545         Minutes 20         Timed Code Treatment Minutes: 0 Minutes       Juan M Frank, PT

## 2022-08-18 NOTE — CARE COORDINATION
Transitional Planning    Updated that patient is looking to be placed back in SNF. Spoke to Gayla Pedro, RN in ED. Pt is agreeable to [facility], referral sent. Pt prefers [facility] but writer spoke to [facility] earlier this day and they had no beds. [facility] also have no beds. Laurel Marie liaison, updated and will contact admissions office. [facility]. [facility], no beds at [facility] today. [phone] with patient at bedside, agreeable to referral to [facility]. Referral sent. 028 Jennifer Ramirez with [facility] at [facility], they can accept but they cannot take patient tonight d/t staffing. Updated Dr. Cheyanne Hernandez. Dr. Cheyanne Hernandez will admit patient under observation status.

## 2022-08-19 PROBLEM — I48.11 LONGSTANDING PERSISTENT ATRIAL FIBRILLATION (HCC): Status: ACTIVE | Noted: 2022-08-19

## 2022-08-19 PROBLEM — I47.29 NSVT (NONSUSTAINED VENTRICULAR TACHYCARDIA) (HCC): Status: ACTIVE | Noted: 2022-08-19

## 2022-08-19 PROBLEM — E86.0 DEHYDRATION: Status: ACTIVE | Noted: 2022-08-19

## 2022-08-19 PROBLEM — I51.89 COMBINED SYSTOLIC AND DIASTOLIC CARDIAC DYSFUNCTION: Status: ACTIVE | Noted: 2022-08-19

## 2022-08-19 LAB
ANION GAP SERPL CALCULATED.3IONS-SCNC: 11 MMOL/L (ref 9–17)
BUN BLDV-MCNC: 26 MG/DL (ref 8–23)
CALCIUM SERPL-MCNC: 8.7 MG/DL (ref 8.6–10.4)
CHLORIDE BLD-SCNC: 105 MMOL/L (ref 98–107)
CO2: 25 MMOL/L (ref 20–31)
CREAT SERPL-MCNC: 1.41 MG/DL (ref 0.7–1.2)
EKG ATRIAL RATE: 90 BPM
EKG ATRIAL RATE: 91 BPM
EKG P AXIS: 56 DEGREES
EKG P AXIS: 56 DEGREES
EKG P-R INTERVAL: 138 MS
EKG Q-T INTERVAL: 436 MS
EKG Q-T INTERVAL: 440 MS
EKG QRS DURATION: 166 MS
EKG QRS DURATION: 168 MS
EKG QTC CALCULATION (BAZETT): 538 MS
EKG QTC CALCULATION (BAZETT): 539 MS
EKG R AXIS: -107 DEGREES
EKG R AXIS: -110 DEGREES
EKG T AXIS: 35 DEGREES
EKG T AXIS: 40 DEGREES
EKG VENTRICULAR RATE: 90 BPM
EKG VENTRICULAR RATE: 92 BPM
GFR AFRICAN AMERICAN: 59 ML/MIN
GFR NON-AFRICAN AMERICAN: 49 ML/MIN
GFR SERPL CREATININE-BSD FRML MDRD: ABNORMAL ML/MIN/{1.73_M2}
GLUCOSE BLD-MCNC: 96 MG/DL (ref 70–99)
INR BLD: 1.4
POTASSIUM SERPL-SCNC: 3.8 MMOL/L (ref 3.7–5.3)
PROTHROMBIN TIME: 13.9 SEC (ref 9.4–12.6)
SODIUM BLD-SCNC: 141 MMOL/L (ref 135–144)

## 2022-08-19 PROCEDURE — 6370000000 HC RX 637 (ALT 250 FOR IP): Performed by: CLINICAL NURSE SPECIALIST

## 2022-08-19 PROCEDURE — 85610 PROTHROMBIN TIME: CPT

## 2022-08-19 PROCEDURE — 96375 TX/PRO/DX INJ NEW DRUG ADDON: CPT

## 2022-08-19 PROCEDURE — 94761 N-INVAS EAR/PLS OXIMETRY MLT: CPT

## 2022-08-19 PROCEDURE — 93005 ELECTROCARDIOGRAM TRACING: CPT | Performed by: HOSPITALIST

## 2022-08-19 PROCEDURE — 6370000000 HC RX 637 (ALT 250 FOR IP): Performed by: HOSPITALIST

## 2022-08-19 PROCEDURE — 2580000003 HC RX 258: Performed by: SURGERY

## 2022-08-19 PROCEDURE — 2580000003 HC RX 258: Performed by: HOSPITALIST

## 2022-08-19 PROCEDURE — 6360000002 HC RX W HCPCS: Performed by: SURGERY

## 2022-08-19 PROCEDURE — 1210000000 HC MED SURG R&B

## 2022-08-19 PROCEDURE — 80048 BASIC METABOLIC PNL TOTAL CA: CPT

## 2022-08-19 PROCEDURE — 36415 COLL VENOUS BLD VENIPUNCTURE: CPT

## 2022-08-19 PROCEDURE — 99232 SBSQ HOSP IP/OBS MODERATE 35: CPT | Performed by: HOSPITALIST

## 2022-08-19 PROCEDURE — 96376 TX/PRO/DX INJ SAME DRUG ADON: CPT

## 2022-08-19 PROCEDURE — 2500000003 HC RX 250 WO HCPCS: Performed by: HOSPITALIST

## 2022-08-19 PROCEDURE — 93308 TTE F-UP OR LMTD: CPT

## 2022-08-19 RX ORDER — HEPARIN SODIUM 1000 [USP'U]/ML
60 INJECTION, SOLUTION INTRAVENOUS; SUBCUTANEOUS PRN
Status: DISCONTINUED | OUTPATIENT
Start: 2022-08-19 | End: 2022-08-19

## 2022-08-19 RX ORDER — HEPARIN SODIUM 10000 [USP'U]/100ML
5-30 INJECTION, SOLUTION INTRAVENOUS CONTINUOUS
Status: DISCONTINUED | OUTPATIENT
Start: 2022-08-19 | End: 2022-08-19

## 2022-08-19 RX ORDER — MAGNESIUM SULFATE IN WATER 40 MG/ML
2000 INJECTION, SOLUTION INTRAVENOUS ONCE
Status: COMPLETED | OUTPATIENT
Start: 2022-08-19 | End: 2022-08-19

## 2022-08-19 RX ORDER — METOPROLOL SUCCINATE 25 MG/1
12.5 TABLET, EXTENDED RELEASE ORAL DAILY
Status: DISCONTINUED | OUTPATIENT
Start: 2022-08-19 | End: 2022-08-20

## 2022-08-19 RX ORDER — FUROSEMIDE 40 MG/1
40 TABLET ORAL DAILY
Qty: 30 TABLET | Refills: 0 | Status: SHIPPED | OUTPATIENT
Start: 2022-08-22 | End: 2022-08-24 | Stop reason: HOSPADM

## 2022-08-19 RX ORDER — METOPROLOL SUCCINATE 25 MG/1
12.5 TABLET, EXTENDED RELEASE ORAL DAILY
Qty: 30 TABLET | Refills: 3 | Status: SHIPPED | OUTPATIENT
Start: 2022-08-20 | End: 2022-08-24 | Stop reason: HOSPADM

## 2022-08-19 RX ORDER — HEPARIN SODIUM 1000 [USP'U]/ML
60 INJECTION, SOLUTION INTRAVENOUS; SUBCUTANEOUS ONCE
Status: DISCONTINUED | OUTPATIENT
Start: 2022-08-19 | End: 2022-08-19

## 2022-08-19 RX ORDER — HEPARIN SODIUM 1000 [USP'U]/ML
30 INJECTION, SOLUTION INTRAVENOUS; SUBCUTANEOUS PRN
Status: DISCONTINUED | OUTPATIENT
Start: 2022-08-19 | End: 2022-08-19

## 2022-08-19 RX ORDER — 0.9 % SODIUM CHLORIDE 0.9 %
500 INTRAVENOUS SOLUTION INTRAVENOUS ONCE
Status: COMPLETED | OUTPATIENT
Start: 2022-08-19 | End: 2022-08-19

## 2022-08-19 RX ORDER — METOPROLOL TARTRATE 5 MG/5ML
5 INJECTION INTRAVENOUS ONCE
Status: COMPLETED | OUTPATIENT
Start: 2022-08-19 | End: 2022-08-19

## 2022-08-19 RX ADMIN — AMIODARONE HYDROCHLORIDE 1 MG/MIN: 50 INJECTION, SOLUTION INTRAVENOUS at 11:16

## 2022-08-19 RX ADMIN — TAMSULOSIN HYDROCHLORIDE 0.4 MG: 0.4 CAPSULE ORAL at 21:19

## 2022-08-19 RX ADMIN — PANTOPRAZOLE SODIUM 40 MG: 40 TABLET, DELAYED RELEASE ORAL at 11:24

## 2022-08-19 RX ADMIN — SODIUM CHLORIDE 500 ML: 9 INJECTION, SOLUTION INTRAVENOUS at 09:31

## 2022-08-19 RX ADMIN — SODIUM CHLORIDE, PRESERVATIVE FREE 5 ML: 5 INJECTION INTRAVENOUS at 21:19

## 2022-08-19 RX ADMIN — METOPROLOL SUCCINATE 12.5 MG: 25 TABLET, EXTENDED RELEASE ORAL at 03:43

## 2022-08-19 RX ADMIN — SODIUM CHLORIDE, PRESERVATIVE FREE 10 ML: 5 INJECTION INTRAVENOUS at 11:24

## 2022-08-19 RX ADMIN — Medication 1 CAPSULE: at 18:37

## 2022-08-19 RX ADMIN — ROSUVASTATIN CALCIUM 20 MG: 20 TABLET, FILM COATED ORAL at 11:24

## 2022-08-19 RX ADMIN — METOPROLOL TARTRATE 5 MG: 5 INJECTION INTRAVENOUS at 09:31

## 2022-08-19 RX ADMIN — Medication 1 CAPSULE: at 11:24

## 2022-08-19 RX ADMIN — APIXABAN 5 MG: 5 TABLET, FILM COATED ORAL at 21:19

## 2022-08-19 RX ADMIN — APIXABAN 5 MG: 5 TABLET, FILM COATED ORAL at 11:24

## 2022-08-19 RX ADMIN — AMIODARONE HYDROCHLORIDE 150 MG: 50 INJECTION, SOLUTION INTRAVENOUS at 10:34

## 2022-08-19 RX ADMIN — MAGNESIUM SULFATE HEPTAHYDRATE 2000 MG: 40 INJECTION, SOLUTION INTRAVENOUS at 15:37

## 2022-08-19 RX ADMIN — AMIODARONE HYDROCHLORIDE 0.5 MG/MIN: 50 INJECTION, SOLUTION INTRAVENOUS at 17:03

## 2022-08-19 ASSESSMENT — PAIN SCALES - GENERAL: PAINLEVEL_OUTOF10: 0

## 2022-08-19 NOTE — PLAN OF CARE
Problem: Discharge Planning  Goal: Discharge to home or other facility with appropriate resources  Outcome: Progressing  Flowsheets  Taken 8/19/2022 0009  Discharge to home or other facility with appropriate resources:   Identify barriers to discharge with patient and caregiver   Identify discharge learning needs (meds, wound care, etc)   Refer to discharge planning if patient needs post-hospital services based on physician order or complex needs related to functional status, cognitive ability or social support system   Arrange for needed discharge resources and transportation as appropriate   Arrange for interpreters to assist at discharge as needed  Taken 8/18/2022 2020  Discharge to home or other facility with appropriate resources: Identify barriers to discharge with patient and caregiver     Problem: Safety - Adult  Goal: Free from fall injury  Outcome: Progressing  Flowsheets (Taken 8/18/2022 2020)  Free From Fall Injury: Instruct family/caregiver on patient safety     Problem: Skin/Tissue Integrity  Goal: Absence of new skin breakdown  Description: 1. Monitor for areas of redness and/or skin breakdown  2. Assess vascular access sites hourly  3. Every 4-6 hours minimum:  Change oxygen saturation probe site  4. Every 4-6 hours:  If on nasal continuous positive airway pressure, respiratory therapy assess nares and determine need for appliance change or resting period.   Outcome: Progressing

## 2022-08-19 NOTE — CARE COORDINATION
08/19/22 1005   Service Assessment   Patient Orientation Alert and Oriented   Cognition Alert   History Provided By Patient   Primary [address] Family Members   Patient's Healthcare Decision Maker is: Legal Next of Collin Serrano   PCP Verified by CM Yes   Prior Functional Level Assistance with the following:   Current Functional Level Assistance with the following:   Can patient return to prior living arrangement No   Ability to make needs known: Good   Family able to assist with home care needs: No   Social/Functional History   Lives With Alone   Type of [address] One level   Home Access Ramped entrance   Bathroom Shower/Tub Tub/Shower unit   Bathroom Toilet Standard   Bathroom Equipment Tub transfer bench   Bathroom Accessibility Accessible   Home Equipment Electric scooter   Receives Help From Personal care attendant   ADL Assistance Independent   Homemaking Assistance Needs assistance   Homemaking Responsibilities Yes   Active  No   Patient's [address] [place] [address]   Occupation Retired   Type of Occupation [place]   Discharge [address] Prior To Admission None   DME Ordered? No   Potential Assistance Purchasing Medications No   Patient expects to be discharged to: Skilled nursing facility   Follow Up Appointment: Best Day/Time  Monday AM   One/Two Story Residence One story   Services At/After Discharge   Mode of Transport at Discharge Ethel Card 6 Follow Up Transport Friends   Condition of Participation: Discharge Planning   The Patient and/or Patient Representative was provided with a Choice of Provider? Patient   The Patient and/Or Patient Representative agree with the Discharge Plan? Yes     Received VM from Dorthey Neema, [address] at [facility]. She states starting in 3 days patient will be charged a copay of $180/day because they are not a [facility] facility.     Spoke with patient about VA contracted nursing facilities. He is agreeable of writer contacting [facility] [place], [address] of [place], and [facility]. Pt requesting private room. Spoke with Arnaldo Holman. They do not have any long term beds available for the foreseeable future. 1130 received call from [facility]. Tim Logan 57 @ [facility], requested clinicals sent manual fax as she does not have epic access. Clinicals faxed. She states they can accommodate private room for $570/month private pay.      [address] left voicemail for [facility]  Porter Keyes ([phone]) to update dispo planning status

## 2022-08-19 NOTE — DISCHARGE INSTR - COC
Continuity of Care Form    Patient Name: Josie Linares   :    MRN:  [id number]    Admit date:  2022  Discharge date:  22    Code Status Order: Full Code   Advance Directives:     Admitting Physician:  Saige Davis DO  PCP: Mik Gray MD    Discharging Nurse: VIA [facility] Unit/Room#: 331/331-01  Discharging Unit Phone Number: [phone]    Emergency Contact:   Extended Emergency Contact Information  Primary Emergency Contact: [facility] Phone: [phone]  Mobile Phone: [phone]  Relation: Child    Past Surgical History:  Past Surgical History:   Procedure Laterality Date    CORONARY ANGIOPLASTY WITH STENT PLACEMENT      PACEMAKER PLACEMENT         Immunization History: There is no immunization history on file for this patient.     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 I21.4    Weakness R53.1       Isolation/Infection:   Isolation            No Isolation          Patient Infection Status       None to display            Nurse Assessment:  Last Vital Signs: /69   Pulse 78   Temp 97.7 °F (36.5 °C) (Oral)   Resp 14   Ht 5' 9\" (1.753 m)   Wt 157 lb (71.2 kg)   SpO2 100%   BMI 23.18 kg/m²     Last documented pain score (0-10 scale):    Last Weight:   Wt Readings from Last 1 Encounters:   22 157 lb (71.2 kg)     Mental Status:  alert, coherent, logical, and thought processes intact    IV Access:  - None    Nursing Mobility/ADLs:  Walking   Assisted  Transfer  Assisted  Bathing  Assisted  Dressing  Assisted  Toileting  Assisted  Feeding  Independent  Med Admin  Assisted  Med Delivery   whole    Wound Care Documentation and Therapy:        Elimination:  Continence: Bowel: Yes  Bladder: Yes  Urinary Catheter: None   Colostomy/Ileostomy/Ileal Conduit: No       Date of Last BM: ***    Intake/Output Summary (Last 24 hours) at 8/19/2022 0727  Last data filed at 8/19/2022 0616  Gross per 24 hour   Intake 1300 ml   Output 1150 ml   Net 150 ml     I/O last 3 completed shifts: In: 1300 [I.V.:800; IV Piggyback:500]  Out: 1150 [Urine:1150]    Safety Concerns: At Risk for Falls    Impairments/Disabilities:      None    Nutrition Therapy:  Current Nutrition Therapy:   - Oral Diet:  General    Routes of Feeding: Oral  Liquids: No Restrictions  Daily Fluid Restriction: no  Last Modified Barium Swallow with Video (Video Swallowing Test): not done    Treatments at the Time of Hospital Discharge:   Respiratory Treatments: ***  Oxygen Therapy:  is not on home oxygen therapy.   Ventilator:    - No ventilator support    Rehab Therapies: Physical Therapy and Occupational Therapy  Weight Bearing Status/Restrictions: No weight bearing restrictions  Other Medical Equipment (for information only, NOT a DME order):  wheelchair, walker, bath bench, bedside commode, and hospital bed  Other Treatments: ***    Patient's personal belongings (please select all that are sent with patient):  None    RN SIGNATURE:  Electronically signed by Natahly Lopez RN on 8/24/22 at 12:52 PM EDT    CASE MANAGEMENT/SOCIAL WORK SECTION    Inpatient Status Date: ***    Readmission Risk Assessment Score:  Readmission Risk              Risk of Unplanned Readmission:  0           Discharging to Facility/ Agency   Name: [facility]  Address: [address] [place] [address]  Phone: [phone]      / signature: Electronically signed by Greyson Arceo RN on 8/24/22 at 12:55 PM EDT    PHYSICIAN SECTION    Prognosis: Good    Condition at Discharge: Stable    Rehab Potential (if transferring

## 2022-08-19 NOTE — CONSULTS
[facility] Cardiology Cardiology    Consult                        Today's Date: 8/19/2022  Patient Name: Peter Rudolph  Date of admission: 8/18/2022 11:58 AM  Patient's age: 68 y. o., 1945  Admission Dx: Weakness [R53.1]  Renal insufficiency [N28.9]  Elevated troponin [R77.8]  Fatigue, unspecified type [R53.83]  Diarrhea, unspecified type [R19.7]    Reason for Consult:  Cardiac evaluation    Requesting Physician: Jessenia Hoyos DO    CHIEF COMPLAINT:  weakness/SOB    History Obtained From:  patient, electronic medical record, /care Everywhere    HISTORY OF PRESENT ILLNESS:      The patient is a 68 y.o.  male with a history of CKD stage 3 , CAD with RCA stenting in 2020, Afib , HDL, HTN, PAD, combined systolic/diastolic heart failure and has a defibrillator since 2000 and was upgraded to a pacer few months ago at the [facility], who is readmitted to the hospital for fatigue and weakness. Patient was in the hospital a week ago was complaining of chest pain and found to be hypotensive and was discharged home. Presents to the hospital again with no chest pain but weakness , cardiology was consulted for vtack on monitor   Patient seen and examined , denies chest pain. Past Medical History:   has a past medical history of Abdominal aortic aneurysm without rupture (HCC), Atrial flutter ([address] Chronic atrial fibrillation, unspecified ([address] Chronic kidney disease, Combined systolic and diastolic heart failure ([address] Diverticulosis large intestine w/o perforation or abscess w/bleeding, Hyperlipidemia, Hypotension, Ischemic cardiomyopathy, MI (myocardial infarction) ([address] MS (multiple sclerosis) ([address] Multiple sclerosis ([address] Pacemaker, artificial, and Urinary retention. Past Surgical History:   has a past surgical history that includes pacemaker placement and Coronary angioplasty with stent. Home Medications:    Prior to Admission medications    Medication Sig Start Date End Date Taking? Authorizing Provider   metoprolol succinate (TOPROL XL) 25 MG extended release tablet Take 0.5 tablets by mouth daily 8/20/22  Yes Saige Davis DO   furosemide (LASIX) 40 MG tablet Take 1 tablet by mouth daily 8/22/22 9/21/22 Yes Saige Davis DO   tamsulosin [facility]) 0.4 MG capsule Take 1 capsule by mouth nightly 7/30/22   Sarai Baez MD   lisinopril (PRINIVIL;ZESTRIL) 2.5 MG tablet Take 1 tablet by mouth in the morning. 7/23/22   Saige Davis DO   apixaban (ELIQUIS) 5 MG TABS tablet Take by mouth 2 times daily    Historical Provider, MD   albuterol sulfate HFA (PROVENTIL;VENTOLIN;PROAIR) 108 (90 Base) MCG/ACT inhaler Inhale 2 puffs into the lungs every 6 hours as needed for Wheezing    Historical Provider, MD   Rosuvastatin Calcium 20 MG CPSP Take by mouth Daily    Historical Provider, MD   nitroGLYCERIN (NITROSTAT) 0.4 MG SL tablet Place 0.4 mg under the tongue every 5 minutes as needed for Chest pain up to max of 3 total doses. If no relief after 1 dose, call 911. Historical Provider, MD   acetaminophen (TYLENOL) 325 MG tablet Take 650 mg by mouth every 6 hours as needed for Pain    Historical Provider, MD   omeprazole (PRILOSEC) 20 MG delayed release capsule Take 20 mg by mouth Daily    Historical Provider, MD   ibuprofen (IBU) 600 MG tablet Take 1 tablet by mouth every 6 hours as needed for Pain 6/25/22 7/30/22  Joni Apley, MD       Allergies:  Codeine, Doxycycline, Erythromycin, Gammagard [immune globulin], and Sulfa antibiotics    Social History:   reports that he has quit smoking. He has never used smokeless tobacco. He reports that he does not drink alcohol and does not use drugs. Family History: family history is not on file. No h/o sudden cardiac death. No for premature CAD    REVIEW OF SYSTEMS:    Constitutional: there has been no unanticipated weight loss. There's been No change in energy level, No change in activity level. Eyes: No visual changes or diplopia.  No scleral icterus. ENT: No Headaches, hearing loss or vertigo. No mouth sores or sore throat. Cardiovascular: see above  Respiratory: see above  Gastrointestinal: No abdominal pain, appetite loss, blood in stools. Genitourinary: No dysuria, trouble voiding, or hematuria. Musculoskeletal:  No gait disturbance, No weakness or joint complaints. Integumentary: No rash or pruritis. Neurological: No headache or diplopia. No tingling  Psychiatric: No anxiety, or depression. Endocrine: No temperature intolerance. Hematologic/Lymphatic: No abnormal bruising or bleeding, blood clots or swollen lymph nodes. Allergic/Immunologic: No nasal congestion or hives. PHYSICAL EXAM:      /69   Pulse 78   Temp 97.7 °F (36.5 °C) (Oral)   Resp 14   Ht 5' 9\" (1.753 m)   Wt 157 lb (71.2 kg)   SpO2 100%   BMI 23.18 kg/m²    Constitutional and General Appearance: alert, cooperative, no distress and appears stated age  HEENT: PERRL, no cervical lymphadenopathy. No masses palpable. Normal oral mucosa  Respiratory:  Normal excursion and expansion without use of accessory muscles  Resp Auscultation: Good respiratory effort. No for increased work of breathing. On auscultation: clear to auscultation bilaterally  Cardiovascular:  Heart tones are crisp and normal. regular S1 and S2.  Jugular venous pulsation Normal  The carotid upstroke is normal in amplitude and contour without delay or bruit   Abdomen:   soft  Bowel sounds present  Extremities:   No edema  Neurological:  Alert and oriented. DATA:    Diagnostics:    EKG: Ventricular-paced rhythm  Abnormal ECG  When compared with ECG of 27-JUL-2022 12:59,  Vent. rate has decreased BY   5 BPM.  ECHO:     Summary  Technically difficult study. Left ventricle is normal in size and normal left ventricular wall thickness. Global left ventricular systolic function is severely reduced Estimated  ejection fraction is 25-30%.   Right ventricle is normal in size with reduced function. Mild aortic insufficiency. Mild mitral regurgitation. Mild tricuspid regurgitation. Estimated right ventricular systolic pressure  is 09.70 mmHg. Aortic root and ascending aorta are mildly dilated measuring 3.9 cm. IVC Increased diameter, but still has inspiratory variation suggesting upper  normal or mildly elevated RA filling pressure (i.e. CVP) . Signature  ----------------------------------------------------------------------------   Electronically signed by Zoe Degroot(Sonographer) on 07/22/2022 08:38 AM  ----------------------------------------------------------------------------     ----------------------------------------------------------------------------   Electronically signed by Abdi Santo(Interpreting physician) on 07/22/2022   02:27 PM  ----------------------------------------------------------------------------. Stress Test: not obtained. Cardiac Angiography:   Dr. Lan Landis, [facility]-  angioplasty of the RCA for in-stent restenosis in July 2020. Report unavailable     Labs:     CBC:   Recent Labs     08/18/22  1310   WBC 7.3   HGB 11.6*   HCT 37.1*          BMP:   Recent Labs     08/18/22  1310 08/19/22  0521   * 141   K 4.4 3.8   CO2 20 25   BUN 29* 26*   CREATININE 1.45* 1.41*   LABGLOM 47* 49*   GLUCOSE 102* 96       BNP: No results for input(s): BNP in the last 72 hours. PT/INR:   Recent Labs     08/19/22  0521   PROTIME 13.9*   INR 1.4     APTT:  No results for input(s): APTT in the last 72 hours. CARDIAC ENZYMES:No results for input(s): CKTOTAL, CKMB, CKMBINDEX, TROPONINI in the last 72 hours. FASTING LIPID PANEL:  Lab Results   Component Value Date/Time    HDL 33 07/28/2022 01:28 AM    TRIG 94 07/28/2022 01:28 AM     LIVER PROFILE:  Recent Labs     08/18/22  1310   AST 19   ALT 20   LABALBU 3.4*         IMPRESSION:    Episodes of vtach on monitor   chronic CHF   2. Chronic  trops 127/108/86/87  secondary to CHF /CKD  3.   Chronic ischemic cardiomyopathy with BIV AICD in place   4. 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 Will start amio drip and Pacer interrogation  Chronic ischemic cardiomyopathy- on the dry side  History of A. Fib-continue Eliquis   Keep K>4 , Mg>2      Discussed with patient and Nurse    Nunu Hicks, [address] [facility] Cardiology Consult    Attending Cardiologist Addendum: I have reviewed the history, physical, subjective, objective, assessment, and plan with the CNP and agree with the note. I have made changes to the note above as needed. Thank you for allowing me to participate in the care of this patient, please do not hesitate to call if you have any questions. Kunal Au, [id number] [facility]  [facility] [place]  [phone]

## 2022-08-19 NOTE — CARE COORDINATION
CLEMENTE received voicemail from [address] [place] [address] ([phone]). Antoine Payment reports patient was discussing [employer] with LTAC, located within [facility], beds available at facility. Nothing had been initiated yet. Phone number to begin authorization process would be [phone]. Antoine Payment reports patient may have some Medicare days left and then LT[facility] could contract for the rest of SNF stay. CLEMENTE notified CM of information.

## 2022-08-19 NOTE — PROGRESS NOTES
[facility]  Office: [address], DO, Aaron Benne, DO, Livier Katt, DO, Chaitanya Mederos Ama, DO, Chayo Arndt MD, Jarrod Mac MD, Lesly Mendez MD, John Norman MD,  Jennifer Singh MD, Glo Moritz, MD, Chris Mclain, DO, Parker Landrum MD,  Zulema Chadwick MD, Bhavin Rogers MD, Thelma Romero, DO, Doyne Apgar, MD, Kiet Garland MD, Filiberto Chen MD, Miguel Delcid DO, Fide Ni MD, Esvin Figueroa MD, Christine Gross, CNP,  Lilian Russell, CNP, Inocencia Jones, CNP, Sarah Beth West, CNP, BECKY HewittC, Marcelo Curry, DNP, Mariaelena Chow, CNP, Colonel Disla, CNP, Yaneth Jimenes, CNP, Meghann James, CNP, Ginna Anderson, CNP, Ketan Esteves, CNS, Georges Recio, [facility], Fatuma Vogel, CNP, Bonilla Mistry, CNP, Azucena Fisher, CNP           [address] [facility]    Progress Note    2022    11:01 AM    Name:   Berny Mendoza  MRN:     [id number]     Acct:      [de-identified]   Room:   [address] [place] [address] Day:  0  Admit Date:  2022 11:58 AM    PCP:   Deirdre Somers MD  Code Status:  Full Code    Subjective:     C/C:   Chief Complaint   Patient presents with    Fatigue     X3-4 days since the diarrhea- pt has been home for  days-prior to that he was @ [facility]. after an MI 2022    Diarrhea     X3 days - denies any N/V     Patient seen in follow-up for generalized weakness, patient states \"I feel better\"    Interval History Status: improved. Patient is feeling better overnight however earlier this morning he developed nonsustained ventricular tachycardia. As mentioned yesterday I do believe that secondary to his loose stools and increasing his Lasix dosing he did become intravascularly depleted. I am going to give him a dose of Lopressor along with a fluid bolus. He is asymptomatic and does have an AICD in place secondary to his known cardiomyopathy.   Case discussed with cardiology and patient is being initiated on amiodarone. Once the patient's cardiovascular status has been stabilized he can be discharged to skilled nursing facility. Regarding his loose stool he has not had any further loose stools since admission. At this point in time he denies any questions or concerns. Brief History: This very pleasant 80-year-old male presented to hospital with generalized weakness and fatigue. The patient has been having some diarrhea and on his own accord opted to increase his Lasix dosing for subjective swelling of his feet. Between his loose stools and increased Lasix dosing he presented to the hospital with clinical dehydration. The patient has been admitted initiated on IV fluids. Early this morning he developed nonsustained ventricular tachycardia. Beta-blockade has been continued, fluid bolus ordered. Cardiology initiating amiodarone drip. Review of Systems:     Constitutional: Positive for weakness, debility  Respiratory:  negative for cough, dyspnea on exertion, shortness of breath, wheezing  Cardiovascular:  negative for chest pain, chest pressure/discomfort, lower extremity edema, palpitations  Gastrointestinal: Patient states that he has not had any loose stools since admission. He denies any nausea, vomiting, diarrhea. Neurological:  negative for dizziness, headache    Medications: Allergies:     Allergies   Allergen Reactions    Codeine     Doxycycline     Erythromycin     Gammagard [Immune Globulin]     Sulfa Antibiotics Hives       Current Meds:   Scheduled Meds:    metoprolol succinate  12.5 mg Oral Daily    sodium chloride  500 mL IntraVENous Once    magnesium sulfate  2,000 mg IntraVENous Once    apixaban  5 mg Oral BID    [Held by provider] furosemide  40 mg Oral Daily    [Held by provider] lisinopril  2.5 mg Oral Daily    pantoprazole  40 mg Oral QAM AC    rosuvastatin  20 mg Oral Daily    tamsulosin  0.4 mg Oral Nightly    sodium chloride flush  5-40 mL IntraVENous 2 times per day    lactobacillus  1 capsule Oral TID WC     Continuous Infusions:    amiodarone      Followed by    amiodarone      sodium chloride Stopped (22 1526)    sodium chloride      sodium chloride 50 mL/hr at 22 2133     PRN Meds: acetaminophen, albuterol sulfate HFA, nitroGLYCERIN, sodium chloride flush, sodium chloride, potassium chloride **OR** potassium alternative oral replacement **OR** potassium chloride, magnesium sulfate, ondansetron **OR** ondansetron, polyethylene glycol, acetaminophen **OR** acetaminophen    Data:     Past Medical History:   has a past medical history of Abdominal aortic aneurysm without rupture ([facility] Chronic atrial fibrillation, unspecified ([facility] Chronic kidney disease, Combined systolic and diastolic heart failure ([facility] Diverticulosis large intestine w/o perforation or abscess w/bleeding, Hyperlipidemia, Hypotension, Ischemic cardiomyopathy, MI (myocardial infarction) ([facility] MS (multiple sclerosis) ([facility] Multiple sclerosis ([facility] Pacemaker, artificial, and Urinary retention. Social History:   reports that he has quit smoking. He has never used smokeless tobacco. He reports that he does not drink alcohol and does not use drugs. Family History: History reviewed. No pertinent family history. Vitals:  /69   Pulse 78   Temp 97.7 °F (36.5 °C) (Oral)   Resp 14   Ht 5' 9\" (1.753 m)   Wt 157 lb (71.2 kg)   SpO2 100%   BMI 23.18 kg/m²   Temp (24hrs), Av °F (36.7 °C), Min:97.7 °F (36.5 °C), Max:98.4 °F (36.9 °C)    No results for input(s): POCGLU in the last 72 hours. I/O (24Hr):     Intake/Output Summary (Last 24 hours) at 2022 1101  Last data filed at 2022 0616  Gross per 24 hour   Intake 1300 ml   Output 1150 ml   Net 150 ml       Labs:  Hematology:  Recent Labs     22  1310 22  0521   WBC 7.3  --    RBC 4.26*  --    HGB 11.6*  --    HCT 37.1*  --    MCV 87.1  --    MCH 27.3  -- MCHC 31.4  --    RDW 15.6*  --      --    MPV 8.2  --    INR  --  1.4     Chemistry:  Recent Labs     08/18/22  1310 08/18/22  1730 08/19/22  0521   *  --  141   K 4.4  --  3.8   CL 99  --  105   CO2 20  --  25   GLUCOSE 102*  --  96   BUN 29*  --  26*   CREATININE 1.45*  --  1.41*   MG  --  2.0  --    ANIONGAP 15  --  11   LABGLOM 47*  --  49*   GFRAA 57*  --  59*   CALCIUM 9.3  --  8.7   PROBNP 2,281*  --   --    TROPHS 61* 51*  --      Recent Labs     08/18/22  1310   PROT 7.0   LABALBU 3.4*   AST 19   ALT 20   ALKPHOS 134*   BILITOT 0.68     ABG:No results found for: POCPH, PHART, PH, POCPCO2, BBJ8DPK, PCO2, POCPO2, PO2ART, PO2, POCHCO3, XCA3EYV, HCO3, NBEA, PBEA, BEART, BE, THGBART, THB, YSO3LDJ, ZALQ7MCZ, H3LEGMUI, O2SAT, FIO2  Lab Results   Component Value Date/Time    SPECIAL 1ml left arm 08/18/2022 01:20 PM     Lab Results   Component Value Date/Time    CULTURE NO GROWTH 16 HOURS 08/18/2022 01:20 PM       Radiology:  XR CHEST PORTABLE    Result Date: 8/18/2022  No acute process.        Physical Examination:       General appearance:  alert, cooperative and no distress  Mental Status:  oriented to person, place and time and normal affect  Lungs:  clear to auscultation bilaterally, normal effort  Heart: Tachycardic at the time of my examination   Abdomen:  soft, nontender, nondistended, normal bowel sounds, no masses, hepatomegaly, splenomegaly  Extremities:  no edema, redness, tenderness in the calves  Skin:  no gross lesions, rashes, induration    Assessment:     Hospital Problems             Last Modified POA    * (Principal) Weakness 8/18/2022 Yes    NSVT (nonsustained ventricular tachycardia) ([facility] 8/19/2022 Yes       Plan:     Generalized weakness  Multifactorial, suspect secondary to his underlying known medical diagnoses complicated by dehydration  Clinical dehydration  Continue fluids  Bolus ordered due to nonsustained ventricular tachycardia  Nonsustained ventricular tachycardia/known ischemic cardiomyopathy  Status post 5 mg IV Lopressor x1  Continue oral metoprolol  Cardiology initiated amiodarone  AICD in place  Will defer interrogation of device to cardiology  Chronic combined congestive systolic and diastolic heart failure  Hold Lasix for now  Resume once hemodynamically stable  Stage IIIa chronic kidney disease  Renal function at baseline  Chronic atrial fibrillation  Continue Eliquis  Tachycardic on exam  Continue beta-blockade, amiodarone initiated per cardiology    Odessa Almanza DO  8/19/2022  11:01 AM

## 2022-08-20 LAB
ANION GAP SERPL CALCULATED.3IONS-SCNC: 10 MMOL/L (ref 9–17)
BUN BLDV-MCNC: 20 MG/DL (ref 8–23)
CALCIUM SERPL-MCNC: 8.8 MG/DL (ref 8.6–10.4)
CHLORIDE BLD-SCNC: 105 MMOL/L (ref 98–107)
CO2: 22 MMOL/L (ref 20–31)
CREAT SERPL-MCNC: 1.23 MG/DL (ref 0.7–1.2)
EKG ATRIAL RATE: 66 BPM
EKG P AXIS: 54 DEGREES
EKG Q-T INTERVAL: 454 MS
EKG QRS DURATION: 174 MS
EKG QTC CALCULATION (BAZETT): 523 MS
EKG R AXIS: -109 DEGREES
EKG T AXIS: 20 DEGREES
EKG VENTRICULAR RATE: 80 BPM
GFR AFRICAN AMERICAN: >60 ML/MIN
GFR NON-AFRICAN AMERICAN: 57 ML/MIN
GFR SERPL CREATININE-BSD FRML MDRD: ABNORMAL ML/MIN/{1.73_M2}
GLUCOSE BLD-MCNC: 116 MG/DL (ref 70–99)
POTASSIUM SERPL-SCNC: 4 MMOL/L (ref 3.7–5.3)
SODIUM BLD-SCNC: 137 MMOL/L (ref 135–144)

## 2022-08-20 PROCEDURE — 80048 BASIC METABOLIC PNL TOTAL CA: CPT

## 2022-08-20 PROCEDURE — 6370000000 HC RX 637 (ALT 250 FOR IP): Performed by: HOSPITALIST

## 2022-08-20 PROCEDURE — 36415 COLL VENOUS BLD VENIPUNCTURE: CPT

## 2022-08-20 PROCEDURE — 6370000000 HC RX 637 (ALT 250 FOR IP): Performed by: CLINICAL NURSE SPECIALIST

## 2022-08-20 PROCEDURE — 6370000000 HC RX 637 (ALT 250 FOR IP): Performed by: INTERNAL MEDICINE

## 2022-08-20 PROCEDURE — 97535 SELF CARE MNGMENT TRAINING: CPT

## 2022-08-20 PROCEDURE — 1210000000 HC MED SURG R&B

## 2022-08-20 PROCEDURE — 99232 SBSQ HOSP IP/OBS MODERATE 35: CPT | Performed by: HOSPITALIST

## 2022-08-20 PROCEDURE — 94760 N-INVAS EAR/PLS OXIMETRY 1: CPT

## 2022-08-20 PROCEDURE — 2580000003 HC RX 258: Performed by: HOSPITALIST

## 2022-08-20 RX ORDER — METOPROLOL SUCCINATE 50 MG/1
50 TABLET, EXTENDED RELEASE ORAL DAILY
Status: DISCONTINUED | OUTPATIENT
Start: 2022-08-21 | End: 2022-08-24 | Stop reason: HOSPADM

## 2022-08-20 RX ORDER — AMIODARONE HYDROCHLORIDE 200 MG/1
200 TABLET ORAL 2 TIMES DAILY
Status: DISCONTINUED | OUTPATIENT
Start: 2022-08-20 | End: 2022-08-24 | Stop reason: HOSPADM

## 2022-08-20 RX ORDER — METOPROLOL SUCCINATE 25 MG/1
37.5 TABLET, EXTENDED RELEASE ORAL ONCE
Status: COMPLETED | OUTPATIENT
Start: 2022-08-20 | End: 2022-08-20

## 2022-08-20 RX ADMIN — AMIODARONE HYDROCHLORIDE 200 MG: 200 TABLET ORAL at 12:34

## 2022-08-20 RX ADMIN — METOPROLOL SUCCINATE 37.5 MG: 25 TABLET, EXTENDED RELEASE ORAL at 12:33

## 2022-08-20 RX ADMIN — Medication 1 CAPSULE: at 08:28

## 2022-08-20 RX ADMIN — AMIODARONE HYDROCHLORIDE 200 MG: 200 TABLET ORAL at 21:05

## 2022-08-20 RX ADMIN — PANTOPRAZOLE SODIUM 40 MG: 40 TABLET, DELAYED RELEASE ORAL at 08:31

## 2022-08-20 RX ADMIN — SODIUM CHLORIDE, PRESERVATIVE FREE 5 ML: 5 INJECTION INTRAVENOUS at 21:10

## 2022-08-20 RX ADMIN — Medication 1 CAPSULE: at 17:24

## 2022-08-20 RX ADMIN — SODIUM CHLORIDE, PRESERVATIVE FREE 10 ML: 5 INJECTION INTRAVENOUS at 08:31

## 2022-08-20 RX ADMIN — Medication 1 CAPSULE: at 12:34

## 2022-08-20 RX ADMIN — APIXABAN 5 MG: 5 TABLET, FILM COATED ORAL at 08:29

## 2022-08-20 RX ADMIN — ROSUVASTATIN CALCIUM 20 MG: 20 TABLET, FILM COATED ORAL at 08:31

## 2022-08-20 RX ADMIN — TAMSULOSIN HYDROCHLORIDE 0.4 MG: 0.4 CAPSULE ORAL at 21:05

## 2022-08-20 RX ADMIN — METOPROLOL SUCCINATE 12.5 MG: 25 TABLET, EXTENDED RELEASE ORAL at 08:29

## 2022-08-20 NOTE — PROGRESS NOTES
[facility]  Office: [address], DO, Guy Johnson, DO, Eduardo Dry, DO, Amy Sparksu Blood, DO, Bere Plata MD, Krissy Perez MD, Conor Parr MD, Ashlie Corley MD,  Humberto Edge MD, Eva Sood MD, Kat Skelton, DO, Lul Cano MD,  Pili Monroy MD, Girish Haider MD, Willis Hernandez, DO, Home Bender MD, Colton Flannery MD, Surekha Hinojosa MD, Claudell Orion, DO, Jn Doll MD, Ro Gregorio MD, Celia Mcintyre, CNP,  Gonzales Majano, CNP, Lakeshia Mayes, CNP, Timothy Yin, CNP, Cassidy Pena PA-C, Aisha Garcia, DNP, Omer Amor, CNP, Berlinda Boast, CNP, John Campuzano, CNP, Leilani Sánchez, CNP, Amy Rodrigues, CNP, Eagle Eduardo, CNS, Nora Lan, [facility], Branden Loya, CNP, Marcello Rosales, CNP, Valente Herrera, [facility]           [facility]    Progress Note    8/20/2022    2:22 PM    Name:   Nathaly Smart  MRN:     [id number]     [place]:      [de-identified]   Room:   [address] [place] [address] Day:  1  Admit Date:  8/18/2022 11:58 AM    PCP:   Kya Light MD  Code Status:  Full Code    Subjective:     C/C:   Chief Complaint   Patient presents with    Fatigue     X3-4 days since the diarrhea- pt has been home for  days-prior to that he was @ [facility] [place] after an [place] April 2022    Diarrhea     X3 days - denies any N/V     Patient seen in follow-up for generalized weakness, fatigue secondary to intravascular depletion complicated by nonsustained ventricular tachycardia. Patient states \"I am feeling better\"    Interval History Status: improved. Patient is feeling better overall. Cardiology input noted and appreciated. Awaiting AICD interrogation as if patient is having ventricular tachycardia he may need transfer to [address] [place] [address] for cardiac catheterization. Further decision making pending AICD evaluation. Beta-blockers being titrated back to home dosing.   Amiodarone has been initiated per cardiology. From a fluid standpoint the patient has improved and I will hold his IV fluids at this point in time. Anticipate that Lasix can be resumed in the next 24 to 48 hours. Patient denies any questions or concerns at this point in time. Brief History: This very pleasant 55-year-old male presented to hospital with generalized weakness and fatigue. The patient has been having some diarrhea and on his own accord opted to increase his Lasix dosing for subjective swelling of his feet. Between his loose stools and increased Lasix dosing he presented to the hospital with clinical dehydration. The patient has been admitted initiated on IV fluids. Early this morning he developed nonsustained ventricular tachycardia. Beta-blockade has been continued, fluid bolus ordered. Neurology has initiated an amiodarone drip and titrated beta-blockers further. AICD interrogation pending, depending on findings patient will either continue treatment with amiodarone or require transfer to [address] [place] [address] for heart catheterization. Review of Systems:     Constitutional:  negative for chills, fevers, sweats  Respiratory:  negative for cough, dyspnea on exertion, shortness of breath, wheezing  Cardiovascular:  negative for chest pain, chest pressure/discomfort, lower extremity edema, palpitations  Gastrointestinal:  negative for abdominal pain, constipation, diarrhea, nausea, vomiting  Neurological:  negative for dizziness, headache    Medications: Allergies:     Allergies   Allergen Reactions    Codeine     Doxycycline     Erythromycin     Gammagard [Immune Globulin]     Sulfa Antibiotics Hives       Current Meds:   Scheduled Meds:    amiodarone  200 mg Oral BID    [START ON 8/21/2022] metoprolol succinate  50 mg Oral Daily    [Held by provider] apixaban  5 mg Oral BID    [Held by provider] furosemide  40 mg Oral Daily    [Held by provider] lisinopril  2.5 mg Oral Daily pantoprazole  40 mg Oral QAM AC    rosuvastatin  20 mg Oral Daily    tamsulosin  0.4 mg Oral Nightly    sodium chloride flush  5-40 mL IntraVENous 2 times per day    lactobacillus  1 capsule Oral TID WC     Continuous Infusions:    sodium chloride      sodium chloride Stopped (22)     PRN Meds: acetaminophen, albuterol sulfate HFA, nitroGLYCERIN, sodium chloride flush, sodium chloride, potassium chloride **OR** potassium alternative oral replacement **OR** potassium chloride, magnesium sulfate, ondansetron **OR** ondansetron, polyethylene glycol, acetaminophen **OR** acetaminophen    Data:     Past Medical History:   has a past medical history of Abdominal aortic aneurysm without rupture ([facility] At[facility] flutter [facility] Chronic atrial fibrillation, unspecified ([facility] Chronic kidney disease, Combined systolic and diastolic heart failure ([facility] Diverticulosis large intestine w/o perforation or abscess w/bleeding, Hyperlipidemia, Hypotension, Ischemic cardiomyopathy, MI (myocardial infarction) ([facility] MS (multiple sclerosis) ([facility] Multiple sclerosis ([facility] Pacemaker, artificial, and Urinary retention. Social History:   reports that he has quit smoking. He has never used smokeless tobacco. He reports that he does not drink alcohol and does not use drugs. Family History: History reviewed. No pertinent family history. Vitals:  /65   Pulse 72   Temp 98.1 °F (36.7 °C) (Temporal)   Resp 14   Ht 5' 9\" (1.753 m)   Wt 153 lb 12.5 oz (69.8 kg)   SpO2 98%   BMI 22.71 kg/m²   Temp (24hrs), Av.9 °F (36.6 °C), Min:97.3 °F (36.3 °C), Max:98.2 °F (36.8 °C)    No results for input(s): POCGLU in the last 72 hours. I/O (24Hr):     Intake/Output Summary (Last 24 hours) at 2022 1422  Last data filed at 2022 0536  Gross per 24 hour   Intake 1857.73 ml   Output 600 ml   Net 1257.73 ml       Labs:  Hematology:  Recent Labs     22  1310 22  0521   WBC 7.3  --    RBC 4.26*  --    HGB 11.6*  --    HCT 37.1*  --    MCV 87.1  --    MCH 27.3  --    MCHC 31.4  --    RDW 15.6*  --      --    MPV 8.2  --    INR  --  1.4     Chemistry:  Recent Labs     08/18/22  1310 08/18/22  1730 08/19/22  0521 08/20/22  0839   *  --  141 137   K 4.4  --  3.8 4.0   CL 99  --  105 105   CO2 20  --  25 22   GLUCOSE 102*  --  96 116*   BUN 29*  --  26* 20   CREATININE 1.45*  --  1.41* 1.23*   MG  --  2.0  --   --    ANIONGAP 15  --  11 10   LABGLOM 47*  --  49* 57*   GFRAA 57*  --  59* >60   CALCIUM 9.3  --  8.7 8.8   PROBNP 2,281*  --   --   --    TROPHS 61* 51*  --   --      Recent Labs     08/18/22  1310   PROT 7.0   LABALBU 3.4*   AST 19   ALT 20   ALKPHOS 134*   BILITOT 0.68     ABG:No results found for: POCPH, PHART, PH, POCPCO2, CEB4NLW, PCO2, POCPO2, PO2ART, PO2, POCHCO3, JBK2GQL, HCO3, NBEA, PBEA, BEART, BE, THGBART, THB, BYA8EBP, NVQM0WOF, M1CONILG, O2SAT, FIO2  Lab Results   Component Value Date/Time    SPECIAL 1ml left arm 08/18/2022 01:20 PM     Lab Results   Component Value Date/Time    CULTURE NO GROWTH 1 DAY 08/18/2022 01:20 PM       Radiology:  XR CHEST PORTABLE    Result Date: 8/18/2022  No acute process.        Physical Examination:       General appearance:  alert, cooperative and no distress  Mental Status:  oriented to person, place and time and normal affect  Lungs:  clear to auscultation bilaterally, normal effort  Heart: Irregular rhythm, rate controlled  Abdomen:  soft, nontender, nondistended, normal bowel sounds, no masses, hepatomegaly, splenomegaly  Extremities:  no edema, redness, tenderness in the calves  Skin:  no gross lesions, rashes, induration    Assessment:     Hospital Problems             Last Modified POA    * (Principal) Weakness 8/18/2022 Yes    NSVT (nonsustained ventricular tachycardia) ([address] 8/19/2022 Yes    Dehydration 8/19/2022 Yes    Combined systolic and diastolic cardiac dysfunction 8/19/2022 Yes    Longstanding persistent atrial fibrillation ([address] 8/19/2022 Yes       Plan:     Generalized weakness  Suspect secondary to intravascular depletion  Okay to stop fluids at this point in time  PT/OT  Cardiac arrhythmia  Amiodarone initiated, Toprol titrated  Await AICD interrogation  Combined systolic and diastolic congestive heart failure  As patient's fluid status appears improved DC IV fluids  Resume patient's Lasix in the next 24 to 48 hours  Atrial fibrillation  Rate presently controlled, Eliquis on hold for possible intervention depending on AICD interrogation    Await AICD interrogation, discharge planning    Mariam Gonzalez DO  8/20/2022  2:22 PM

## 2022-08-20 NOTE — PLAN OF CARE
Problem: Discharge Planning  Goal: Discharge to home or other facility with appropriate resources  Outcome: Progressing  Flowsheets (Taken 8/19/2022 1955)  Discharge to home or other facility with appropriate resources: Identify barriers to discharge with patient and caregiver     Problem: Safety - Adult  Goal: Free from fall injury  Outcome: Progressing  Flowsheets (Taken 8/19/2022 1955)  Free From Fall Injury: Instruct family/caregiver on patient safety     Problem: Skin/Tissue Integrity  Goal: Absence of new skin breakdown  Description: 1. Monitor for areas of redness and/or skin breakdown  2. Assess vascular access sites hourly  3. Every 4-6 hours minimum:  Change oxygen saturation probe site  4. Every 4-6 hours:  If on nasal continuous positive airway pressure, respiratory therapy assess nares and determine need for appliance change or resting period.   Outcome: Progressing

## 2022-08-20 NOTE — PROGRESS NOTES
Occupational Therapy  Facility/Department: [facility]  Daily Treatment Note      NAME: You Hernandez  : 1945  MRN: [id number]      Date of Service: 2022    Discharge Recommendations:  Patient would benefit from continued therapy after discharge    Patient Diagnosis(es): The primary encounter diagnosis was Fatigue, unspecified type. Diagnoses of Diarrhea, unspecified type, Renal insufficiency, and Elevated troponin were also pertinent to this visit. Assessment    Activity Tolerance: Patient tolerated evaluation without incident  Discharge Recommendations: Patient would benefit from continued therapy after discharge        Plan   Plan  Times per Week: 5-6x/wk  Times per Day: Daily  Current Treatment Recommendations: Balance training;Functional mobility training; Endurance training;Strengthening;Equipment evaluation, education, & procurement; Safety education & training;Patient/Caregiver education & training;Self-Care / ADL; Home management training       Subjective   Subjective  Subjective: RN approved Pt to be seen for OT treatment session. Pain: 0/10 (no pain)  Cognition  Overall Cognitive Status: Exceptions  Arousal/Alertness: Delayed responses to stimuli  Following Commands: Follows one step commands with increased time; Follows one step commands with repetition  Attention Span: Attends with cues to redirect  Safety Judgement: Decreased awareness of need for assistance  Insights: Decreased awareness of deficits  Initiation: Requires cues for some  Sequencing: Requires cues for some      Objective    Bed Mobility Training  Bed Mobility Training: Yes  Overall Level of Assistance: Contact-guard assistance;Stand-by assistance; Additional time; Adaptive equipment (HOB elevated (45*), Bedrail (Right))  Interventions: Verbal cues; Tactile cues (Mod Cues for task initiation and accurate use of DME)  Supine to Sit: Contact-guard assistance; Additional time; Adaptive equipment  Scooting: Stand-by assistance; Additional time; Adaptive equipment  Balance  Sitting: With support  Standing: With support  Transfer Training  Transfer Training: Yes  Interventions: Verbal cues; Tactile cues; Safety awareness training (Mod Cues for task initiation and accurate use of DME)  Sit to Stand: Moderate assistance; Additional time; Adaptive equipment;Assist X1 (Mod Sit to Stand (using [employer] Bodo))  Stand to Sit: Moderate assistance;Assist X1;Additional time; Adaptive equipment (Standing from [employer] into [facility])  Bed to Chair: Moderate assistance;Assist X1;Additional time; Adaptive equipment (Using Flak[employer] Bodo)    ADL  Grooming: Stand by assistance;Verbal cueing  Grooming Skilled Clinical Factors: Sitting upright in recliner chair for hand hygiene. LE Dressing: Maximum assistance; Increased time to complete;Verbal cueing  LE Dressing Skilled Clinical Factors: Sitting EOB to don Bilateral socks prior to t/f. Pt reports he never wears socks or shoes at home. Patient Education  Education Given To: Patient  Education Provided: Equipment;Precautions;Transfer Training;Energy Conservation; ADL Adaptive Strategies; Fall Prevention Strategies  Education Method: Demonstration;Verbal  Education Outcome: Verbalized understanding;Continued education needed      Goals  Short Term Goals  Time Frame for Short term goals: 14 visits  Short Term Goal 1: Pt will perform grooming/UB ADL tasks with mod IND using AE/DME PRN  Short Term Goal 2: Pt will perform toileting/LB ADL tasks with CGA and use of AE/DME PRN  Short Term Goal 3: Demo 1+ minute standing tolerance with use of least restrictive AD for increased participation in ADL tasks  Short Term Goal 4: Perform functional transfers with CGA and use of least restrictive AD for increased independence with ADL tasks  Short Term Goal 5:  Independently demo good safety awareness during engagement in all ADLs and functional transfers/functional mobility      Therapy Time   Individual Concurrent Group Co-treatment   Time In 3911         Time Out 1531         Minutes 16         Timed Code Treatment Minutes: 16 Minutes (ADL)    ELLA Mcknight, OTR/L

## 2022-08-20 NOTE — PROGRESS NOTES
[facility]   Progress Note                   Date:   8/20/2022  Patient name: Ruthann Landers  Date of admission:  8/18/2022 11:58 AM  MRN:   [id number]  YOB: 1945  PCP: Katya Martinez MD    Reason for Admission:     Subjective:       Clinical Changes / Abnormalities:  Seen and examined  No cp, sob, orthopnea.    AV paced on monitor       Medications:   Scheduled Meds:   Current Facility-Administered Medications:     amiodarone (CORDARONE) tablet 200 mg, 200 mg, Oral, BID, Abdi Santo DO    metoprolol succinate (TOPROL XL) extended release tablet 12.5 mg, 12.5 mg, Oral, Daily, Torri Cowart, APRN - CNS, 12.5 mg at 08/20/22 0829    0.9 % sodium chloride infusion, , IntraVENous, Continuous, Lucille Davis MD, Stopped at 08/18/22 1526    acetaminophen (TYLENOL) tablet 650 mg, 650 mg, Oral, Q6H PRN, Lavelle Clunes, DO    albuterol sulfate HFA (PROVENTIL;VENTOLIN;PROAIR) 108 (90 Base) MCG/ACT inhaler 2 puff, 2 puff, Inhalation, Q6H PRN, Lavelle Clunes, DO    apixaban (ELIQUIS) tablet 5 mg, 5 mg, Oral, BID, Lavelle Clunes, DO, 5 mg at 08/20/22 9058    [Held by provider] furosemide (LASIX) tablet 40 mg, 40 mg, Oral, Daily, Lavelle Clunes, DO, 40 mg at 08/18/22 2134    [Held by provider] lisinopril (PRINIVIL;ZESTRIL) tablet 2.5 mg, 2.5 mg, Oral, Daily, Lavelle Clunes, DO    nitroGLYCERIN (NITROSTAT) SL tablet 0.4 mg, 0.4 mg, SubLINGual, Q5 Min PRN, Lavelle Clunes, DO    pantoprazole (PROTONIX) tablet 40 mg, 40 mg, Oral, Melivn FAIRBANKS, DO, 40 mg at 08/20/22 0831    rosuvastatin (CRESTOR) tablet 20 mg, 20 mg, Oral, Daily, Lavelle Clunes, DO, 20 mg at 08/20/22 0831    tamsulosin (FLOMAX) capsule 0.4 mg, 0.4 mg, Oral, Nightly, Lavelle Robb DO, 0.4 mg at 08/19/22 2119    sodium chloride flush 0.9 % injection 5-40 mL, 5-40 mL, IntraVENous, 2 times per day, Lavelle Robb DO, 10 mL at 08/20/22 0831    sodium chloride flush 0.9 % injection 10 mL, 10 mL, IntraVENous, PRN, Wilma Saúl, DO    0.9 % sodium chloride infusion, , IntraVENous, PRN, Wilma Saúl, DO    potassium chloride (KLOR-CON M) extended release tablet 40 mEq, 40 mEq, Oral, PRN **OR** potassium bicarb-citric acid (EFFER-K) effervescent tablet 40 mEq, 40 mEq, Oral, PRN **OR** potassium chloride 10 mEq/100 mL IVPB (Peripheral Line), 10 mEq, IntraVENous, PRN, Wilma Saúl, DO    magnesium sulfate 1000 mg in dextrose 5% 100 mL IVPB, 1,000 mg, IntraVENous, PRN, Wilma Saúl, DO    ondansetron (ZOFRAN-ODT) disintegrating tablet 4 mg, 4 mg, Oral, Q8H PRN **OR** ondansetron (ZOFRAN) injection 4 mg, 4 mg, IntraVENous, Q6H PRN, Wilma Saúl, DO    polyethylene glycol (GLYCOLAX) packet 17 g, 17 g, Oral, Daily PRN, Wilma Saúl, DO    acetaminophen (TYLENOL) tablet 650 mg, 650 mg, Oral, Q6H PRN **OR** acetaminophen (TYLENOL) suppository 650 mg, 650 mg, Rectal, Q6H PRN, Wilma Saúl, DO    0.9 % sodium chloride infusion, , IntraVENous, Continuous, Wilma Saúl, DO, Stopped at 08/19/22 1957    lactobacillus (CULTURELLE) capsule 1 capsule, 1 capsule, Oral, TID WC, Wilma Saúl, DO, 1 capsule at 08/20/22 0828   Continuous Infusions:   sodium chloride Stopped (08/18/22 1526)    sodium chloride      sodium chloride Stopped (08/19/22 1957)     CBC:   Recent Labs     08/18/22  1310   WBC 7.3   HGB 11.6*        BMP:    Recent Labs     08/18/22  1310 08/19/22  0521 08/20/22  0839   * 141 137   K 4.4 3.8 4.0   CL 99 105 105   CO2 20 25 22   BUN 29* 26* 20   CREATININE 1.45* 1.41* 1.23*   GLUCOSE 102* 96 116*     Hepatic:   Recent Labs     08/18/22  1310   AST 19   ALT 20   BILITOT 0.68   ALKPHOS 134*     Troponin:   Recent Labs     08/18/22  1310 08/18/22  1730   TROPHS 61* 51*     BNP: No results for input(s): BNP in the last 72 hours. Lipids: No results for input(s): CHOL, HDL in the last 72 hours.     Invalid input(s): Height:                       69 inch, 175.26 cm      Corporate ID [id number]    Weight:                       172 pounds, 78 kg   #      Patient Acct [de-identified]   BSA:           1.94 m^2       BMI:      25.4   #                                                                kg/m^2      MR #         [id number]     Sonographer                   Stefani Montague      Accession #  [id number]  Interpreting Physician        [facility]      Fellow                   Referring Nurse Practitioner      Interpreting             Referring Physician           Kristina Luis,   Fellow     Type of Study      TTE procedure:2D Echocardiogram, M-Mode, Doppler, Color Doppler. Procedure Date  Date: 07/22/2022 Start: 07:18 AM    Study Location: [facility]  Technical Quality: Limited visualization due to poor acoustical window. Indications:Chest pain. History / Tech. Comments:  Procedure explained to patient. History of HDL, HTN, MI, multiple sclerosis,  former smoker, systolic heart failure, peripheral vascular disease,  pacemaker/defibrillator    Patient Status: Inpatient    Height: 69 inches Weight: 172 pounds BSA: 1.94 m^2 BMI: 25.4 kg/m^2    HR: 72 bpm BP: 105/46 mmHg    CONCLUSIONS    Summary  Technically difficult study. Left ventricle is normal in size and normal left ventricular wall thickness. Global left ventricular systolic function is severely reduced Estimated  ejection fraction is 25-30%. Right ventricle is normal in size with reduced function. Mild aortic insufficiency. Mild mitral regurgitation. Mild tricuspid regurgitation. Estimated right ventricular systolic pressure  is 01.19 mmHg. Aortic root and ascending aorta are mildly dilated measuring 3.9 cm. IVC Increased diameter, but still has inspiratory variation suggesting upper  normal or mildly elevated RA filling pressure (i.e. CVP) .     Signature  ---------------------------------------------------------------------------- Systolic NVAUUA:42.7 ml   IVSd:1.1 cm(0.6 - 1.1 cm)        Aortic Root:3.9 cm(2.0 - 3.7 cm)   LVPWd:1.1 cm(0.6 - 1.1 cm)       LA Dimension: 3.8 cm(1.9 - 4.0 cm)   Fractional Shortenin.36 %    LA volume/Index: 64.8 ml /33m^2   Calculated LVEF (%): 36.17 %     LVOT:2.4 cm                                    RVDd:4.1 cm      Mitral:                                  Aortic      Valve Area (P1/2-Time): 2.82 cm^2        Peak Velocity: 1.59 m/s   Peak E-Wave: 0.80 m/s                    Mean Velocity: 1.09 m/s   Peak A-Wave: 0.81 m/s                    Peak Gradient: 10.11 mmHg   E/A Ratio: 0.99                          Mean Gradient: 6 mmHg   Peak Gradient: 2.56 mmHg   Deceleration Time: 267 msec   P1/2t: 78 msec                           Area (continuity): 1.72 cm^2                                            AV VTI: 32.9 cm      Tricuspid:                               Pulmonic:      Peak TR Velocity: 2.39 m/s               Peak Velocity: 0.78 m/s   Peak TR Gradient: 22.8484 mmHg           Peak Gradient: 2.43 mmHg   Estimated RA Pressure: 10 mmHg                                               Estimated PASP: 32.85 mmHg     Septal Wall E' velocity:0.05 m/s  Lateral Wall E' velocity:0.06 m/s     No results found for this or any previous visit. No results found for this or any previous visit.     Results for orders placed during the hospital encounter of 22    Echocardiogram Limited 2D Adult    Narrative  [address] [place] [address]    Transthoracic Echocardiography Report (TTE)    Patient Name Mary Ann Gustavo       Date of Study               2022  Nikolay Boop    Date of      1945  Gender                      Male  Birth    Age          68 year(s)  Race                            Room Number         Height:                     69 inch, 175.26 cm    Corporate ID [id number]    Weight:                     157 pounds, 71.2 kg  #    Patient Acct [de-identified]   BSA:          1.86 m^2      BMI: 23.18 kg/m^2  #    MR #         [id number]     Sonographer                 Kati Garza    Accession #  [id number]  Interpreting Physician      [facility]    Fellow                   Referring Nurse  Practitioner    Interpreting             Referring Physician         Kathleen Thorpe  Fellow    Type of Study    TTE procedure:2D Echocardiogram, Limited Echo. Procedure Date  Date: 08/19/2022 Start: 10:24 AM    Study Location: [facility]  Technical Quality: Fair visualization    Indications:Pericardial effusion and Ventricular tachycardia. History / Tech. Comments:  Procedure explained to patient. History of afib, CAD, RCA stent, HDL, MI,  HTN, multiple sclerosis, former smoker, systolic heart failure, peripheral  vascular disease, pacemaker/defibrillator, Vtach    Patient Status: Inpatient    Height: 69 inches Weight: 157 pounds BSA: 1.86 m^2 BMI: 23.18 kg/m^2    Rhythm: Within normal limits HR: 72 bpm    CONCLUSIONS    Summary  A limited echocardiogram was perform to assess pericardial effusion. Left ventricle is normal in size, normal left ventricular wall thickness,  global left ventricular systolic function is severely reduced with a  calculated EF 25.4%. Severe global hypokinesis. Right ventricular dilatation with reduced systolic function. Pacemaker / ICD  lead seen in right ventricle and right atrium. No pericardial effusion seen. Aortic root is mildly dilated measuring 4.0cm.     Signature  ----------------------------------------------------------------------------  Electronically signed by Zoe Welch(Sonographer) on 08/19/2022 11:24 AM  ----------------------------------------------------------------------------    ----------------------------------------------------------------------------  Electronically signed by [facility]) on 08/19/2022  12:57 PM  ----------------------------------------------------------------------------  FINDINGS    Left Ventricle  Left ventricle is normal in size, normal left ventricular wall thickness,  global left ventricular systolic function is severely reduced with a  calculated EF 25.4%. Severe global hypokinesis. Right Atrium  Pacemaker / ICD lead seen in right atrium. Right Ventricle  Right ventricular dilatation with reduced systolic function. Pacemaker / ICD lead seen in right ventricle. Mitral Valve  The mitral valve opens well. Aortic Valve  Not well visualized but appears to open. Tricuspid Valve  The tricuspid valve opens well. Pulmonic Valve  Pulmonic valve not well visualized. Pericardial Effusion  No pericardial effusion seen. Pleural Effusion  No pleural effusion seen. Miscellaneous  Aortic root is mildly dilated measuring 4.0cm. M-mode / 2D Measurements & Calculations:    LVIDd:5.1 cm(3.7 - 5.6 cm)       Diastolic QHTBZV:962 ml  XDVCC:6.0 cm(2.2 - 4.0 cm)       Systolic EYQBTM:570 ml  BAUC:6.9 cm(0.6 - 1.1 cm)        Aortic Root:4 cm(2.0 - 3.7 cm)  LVPWd:1 cm(0.6 - 1.1 cm)         LA Dimension: 4.1 cm(1.9 - 4.0 cm)  Fractional Shortenin.76 %  Calculated LVEF (%): 25.35 %        Assessment / Acute Cardiac Problems:   Patient Active Problem List:    - Wide QRS on Monitor- Maybe VT other differentials include AF with abberancy- improved on IV Amio  - Known ischemic CM s/p Biv AICD  - Parox AF / Flutter  - Elevated trop due to CKD  - EF 25%  - Known CAD s/p PCI- apparently had PCI to ISR of RCA on 2020 with [facility] per care everywhere    Plan of Treatment:   - change amio to 200 po bid  - needs AICD interrogation- if it is truly VT will need tx to [facility] for cath during this admission after nephro eval  - if it is AT / AF will treat medically  - increase BB  - hold eliquis pending interrogation     Thank you for allowing me to participate in the care of this patient, please do not hesitate to call if you have any questions.     Dana Griffith, , [facility], [address], [address], [facility] AT [facility]  [facility]  [phone]

## 2022-08-21 PROBLEM — I47.29 MONOMORPHIC VENTRICULAR TACHYCARDIA (HCC): Status: ACTIVE | Noted: 2022-08-21

## 2022-08-21 PROCEDURE — 6370000000 HC RX 637 (ALT 250 FOR IP): Performed by: INTERNAL MEDICINE

## 2022-08-21 PROCEDURE — 2580000003 HC RX 258: Performed by: HOSPITALIST

## 2022-08-21 PROCEDURE — 6370000000 HC RX 637 (ALT 250 FOR IP): Performed by: HOSPITALIST

## 2022-08-21 PROCEDURE — 94664 DEMO&/EVAL PT USE INHALER: CPT

## 2022-08-21 PROCEDURE — 99232 SBSQ HOSP IP/OBS MODERATE 35: CPT | Performed by: HOSPITALIST

## 2022-08-21 PROCEDURE — 94760 N-INVAS EAR/PLS OXIMETRY 1: CPT

## 2022-08-21 PROCEDURE — 94640 AIRWAY INHALATION TREATMENT: CPT

## 2022-08-21 PROCEDURE — 1200000000 HC SEMI PRIVATE

## 2022-08-21 RX ADMIN — ROSUVASTATIN CALCIUM 20 MG: 20 TABLET, FILM COATED ORAL at 08:37

## 2022-08-21 RX ADMIN — SODIUM CHLORIDE, PRESERVATIVE FREE 10 ML: 5 INJECTION INTRAVENOUS at 19:46

## 2022-08-21 RX ADMIN — Medication 1 CAPSULE: at 08:37

## 2022-08-21 RX ADMIN — AMIODARONE HYDROCHLORIDE 200 MG: 200 TABLET ORAL at 08:37

## 2022-08-21 RX ADMIN — ALBUTEROL SULFATE 2 PUFF: 90 AEROSOL, METERED RESPIRATORY (INHALATION) at 08:40

## 2022-08-21 RX ADMIN — AMIODARONE HYDROCHLORIDE 200 MG: 200 TABLET ORAL at 19:46

## 2022-08-21 RX ADMIN — Medication 1 CAPSULE: at 12:23

## 2022-08-21 RX ADMIN — SODIUM CHLORIDE, PRESERVATIVE FREE 10 ML: 5 INJECTION INTRAVENOUS at 08:37

## 2022-08-21 RX ADMIN — PANTOPRAZOLE SODIUM 40 MG: 40 TABLET, DELAYED RELEASE ORAL at 08:37

## 2022-08-21 RX ADMIN — FUROSEMIDE 40 MG: 20 TABLET ORAL at 08:37

## 2022-08-21 RX ADMIN — TAMSULOSIN HYDROCHLORIDE 0.4 MG: 0.4 CAPSULE ORAL at 19:46

## 2022-08-21 RX ADMIN — METOPROLOL SUCCINATE 50 MG: 50 TABLET, FILM COATED, EXTENDED RELEASE ORAL at 08:37

## 2022-08-21 ASSESSMENT — PAIN SCALES - GENERAL: PAINLEVEL_OUTOF10: 0

## 2022-08-21 NOTE — PROGRESS NOTES
AICD interrogated. Case discussed with the [facility]. Patient has been having significant runs of monomorphic ventricular tachycardia. Cardiology has been notified and at this point in time requested been made to transfer the patient to [address] [place] [address] for cardiac catheterization. I have contacted access and patient will be transferred later today anticipation of cardiac catheterization tomorrow. Further decision making regarding disposition to skilled nursing facility will be made following his cardiac catheterization.     Electronically signed by Homar Arevalo DO on 8/21/2022 at 1:43 PM

## 2022-08-21 NOTE — PLAN OF CARE
Problem: Discharge Planning  Goal: Discharge to home or other facility with appropriate resources  Outcome: Progressing  Flowsheets (Taken 8/20/2022 1955)  Discharge to home or other facility with appropriate resources: Identify barriers to discharge with patient and caregiver     Problem: Safety - Adult  Goal: Free from fall injury  Outcome: Progressing  Flowsheets (Taken 8/20/2022 1955)  Free From Fall Injury: Instruct family/caregiver on patient safety     Problem: Skin/Tissue Integrity  Goal: Absence of new skin breakdown  Description: 1. Monitor for areas of redness and/or skin breakdown  2. Assess vascular access sites hourly  3. Every 4-6 hours minimum:  Change oxygen saturation probe site  4. Every 4-6 hours:  If on nasal continuous positive airway pressure, respiratory therapy assess nares and determine need for appliance change or resting period.   Outcome: Progressing

## 2022-08-21 NOTE — PROGRESS NOTES
Has device interrogation. Showed 3 weeks worth of AFib and elevated fluid levels. Also showed multiple runs of VTach in monitored zone with . Needs cardiac cath. Patient wants to be transferred to [facility] rather than [facility]. Nurse instructed to initiate transfer.

## 2022-08-21 NOTE — PLAN OF CARE
Respiratory in to assess patient. Returning from bathroom he appears to be sob. Audible expiratory wheeze noted. PRN Albuterol mdi given. Patient states he does feel some relief. Room air SpO2 96% Will continue to follow plan of care.

## 2022-08-21 NOTE — PROGRESS NOTES
[facility]  Office: [address], DO, Guy Johnson, DO, Eduardo Dry, DO, Amy Uyen Blood, DO, Bere Plata MD, Krissy Perez MD, Conor Parr MD, Ashlie Corley MD,  Humberto Edge MD, Eva Sood MD, Kat Skelton, DO, Lul Cano MD,  Pili Monroy MD, Girish Haider MD, Willis Hernandez, DO, Home Bender MD, Colton Flannery MD, Surekha Hinojosa MD, Claudell Orion, DO, Jn Doll MD, Ro Gregorio MD, Celia Mcintyre, CNP,  Gonzales Majano, CNP, Lakeshia Mayes, CNP, Timothy Yin, CNP, Cassidy Pena PA-C, Aisha Garcia, [facility], Omer Amor, CNP, Berlinda Boast, CNP, John Campuzano, CNP, Leilani Sánchez, CNP, Amy Rodrigues, CNP, Eagle Eduardo, CNS, Nora Lan, [facility], Branden Loya, CNP, Marcello Rosales, CNP, Valente Herrera, [facility]           [address] [facility]    Progress Note    8/21/2022    11:42 AM    Name:   Nathaly Smart  MRN:     [id number]     [place]:      [de-identified]   Room:   [address] [place] [address] Day:  2  Admit Date:  8/18/2022 11:58 AM    PCP:   Kya Light MD  Code Status:  Full Code    Subjective:     C/C:   Chief Complaint   Patient presents with    Fatigue     X3-4 days since the diarrhea- pt has been home for  days-prior to that he was @ [facility] after an MI April 2022    Diarrhea     X3 days - denies any N/V     Patient seen in follow-up for weakness, fatigue secondary to dehydration complicated by cardiac arrhythmia. Patient states \"I feel fine\"    Interval History Status: improved. Patient continues to do well. He has been transitioned off amiodarone drip and is presently maintained on oral medications. Case discussed with cardiology. At this point in time we are awaiting interrogation of his AICD to determine his arrhythmia earlier in admission.   If ventricular tachycardia is identified recommendations are for transfer to [address] [place] [address] for cardiac catheterization. In the event that ventricular tachycardia is not identified then he can be discharged to skilled nursing facility for rehabilitation. Further decision making will be made pending AICD interrogation. The patient himself denies any questions or concerns at this point in time. Brief History: This very pleasant 80-year-old male presented to hospital with generalized weakness and fatigue. The patient has been having some diarrhea and on his own accord opted to increase his Lasix dosing for subjective swelling of his feet. Between his loose stools and increased Lasix dosing he presented to the hospital with clinical dehydration. The patient has been admitted initiated on IV fluids. Early this morning he developed nonsustained ventricular tachycardia. Beta-blockade has been continued, fluid bolus ordered. Neurology has initiated an amiodarone drip and titrated beta-blockers further. AICD interrogation pending, depending on findings patient will either continue treatment with amiodarone or require transfer to [place] [address] for heart catheterization. Review of Systems:     Constitutional:  negative for chills, fevers, sweats  Respiratory:  negative for cough, dyspnea on exertion, shortness of breath, wheezing  Cardiovascular:  negative for chest pain, chest pressure/discomfort, lower extremity edema, palpitations  Gastrointestinal:  negative for abdominal pain, constipation, diarrhea, nausea, vomiting  Neurological:  negative for dizziness, headache    Medications: Allergies:     Allergies   Allergen Reactions    Codeine     Doxycycline     Erythromycin     Gammagard [Immune Globulin]     Sulfa Antibiotics Hives       Current Meds:   Scheduled Meds:    amiodarone  200 mg Oral BID    metoprolol succinate  50 mg Oral Daily    [Held by provider] apixaban  5 mg Oral BID    furosemide  40 mg Oral Daily    [Held by provider] lisinopril  2.5 mg Oral Daily    pantoprazole 40 mg Oral QAM AC    rosuvastatin  20 mg Oral Daily    tamsulosin  0.4 mg Oral Nightly    sodium chloride flush  5-40 mL IntraVENous 2 times per day    lactobacillus  1 capsule Oral TID WC     Continuous Infusions:    sodium chloride       PRN Meds: acetaminophen, albuterol sulfate HFA, nitroGLYCERIN, sodium chloride flush, sodium chloride, potassium chloride **OR** potassium alternative oral replacement **OR** potassium chloride, magnesium sulfate, ondansetron **OR** ondansetron, polyethylene glycol, acetaminophen **OR** acetaminophen    Data:     Past Medical History:   has a past medical history of Abdominal aortic aneurysm without rupture (HCC), Atrial flutter ([facility] Chronic atrial fibrillation, unspecified ([facility] Chronic kidney disease, Combined systolic and diastolic heart failure ([facility] Diverticulosis large intestine w/o perforation or abscess w/bleeding, Hyperlipidemia, Hypotension, Ischemic cardiomyopathy, MI (myocardial infarction) ([facility]multiple [facility] Multiple sclerosis ([facility] Pacemaker, artificial, and Urinary retention. Social History:   reports that he has quit smoking. He has never used smokeless tobacco. He reports that he does not drink alcohol and does not use drugs. Family History: History reviewed. No pertinent family history. Vitals:  /74   Pulse 87   Temp 98.1 °F (36.7 °C)   Resp 20   Ht 5' 9\" (1.753 m)   Wt 154 lb 12.2 oz (70.2 kg)   SpO2 96%   BMI 22.85 kg/m²   Temp (24hrs), Av.1 °F (36.7 °C), Min:97.2 °F (36.2 °C), Max:98.8 °F (37.1 °C)    No results for input(s): POCGLU in the last 72 hours. I/O (24Hr):     Intake/Output Summary (Last 24 hours) at 2022 1142  Last data filed at 2022  Gross per 24 hour   Intake 120 ml   Output --   Net 120 ml       Labs:  Hematology:  Recent Labs     22  1310 22  0521   WBC 7.3  --    RBC 4.26*  --    HGB 11.6*  --    HCT 37.1*  --    MCV 87.1  --    MCH 27.3  --    MCHC 31.4  --    RDW 15.6*  --      --    MPV 8.2  --    INR  --  1.4     Chemistry:  Recent Labs     08/18/22  1310 08/18/22  1730 08/19/22  0521 08/20/22  0839   *  --  141 137   K 4.4  --  3.8 4.0   CL 99  --  105 105   CO2 20  --  25 22   GLUCOSE 102*  --  96 116*   BUN 29*  --  26* 20   CREATININE 1.45*  --  1.41* 1.23*   MG  --  2.0  --   --    ANIONGAP 15  --  11 10   LABGLOM 47*  --  49* 57*   GFRAA 57*  --  59* >60   CALCIUM 9.3  --  8.7 8.8   PROBNP 2,281*  --   --   --    TROPHS 61* 51*  --   --      Recent Labs     08/18/22  1310   PROT 7.0   LABALBU 3.4*   AST 19   ALT 20   ALKPHOS 134*   BILITOT 0.68     ABG:No results found for: POCPH, PHART, PH, POCPCO2, JXY0EPM, PCO2, POCPO2, PO2ART, PO2, POCHCO3, BAD5TZY, HCO3, NBEA, PBEA, BEART, BE, THGBART, THB, TIE4LFU, LLVZ4RUW, E4KJQOLJ, O2SAT, FIO2  Lab Results   Component Value Date/Time    SPECIAL 1ml left arm 08/18/2022 01:20 PM     Lab Results   Component Value Date/Time    CULTURE NO GROWTH 2 DAYS 08/18/2022 01:20 PM       Radiology:  XR CHEST PORTABLE    Result Date: 8/18/2022  No acute process.        Physical Examination:       General appearance:  alert, cooperative and no distress  Mental Status:  oriented to person, place and time and normal affect  Lungs:  clear to auscultation bilaterally, normal effort  Heart: Irregular rhythm, rate controlled  Abdomen:  soft, nontender, nondistended, normal bowel sounds, no masses, hepatomegaly, splenomegaly  Extremities:  no edema, redness, tenderness in the calves  Skin:  no gross lesions, rashes, induration    Assessment:     Hospital Problems             Last Modified POA    * (Principal) Weakness 8/18/2022 Yes    NSVT (nonsustained ventricular tachycardia) ([address] 8/19/2022 Yes    Dehydration 8/19/2022 Yes    Combined systolic and diastolic cardiac dysfunction 8/19/2022 Yes    Longstanding persistent atrial fibrillation ([address] 8/19/2022 Yes       Plan:     Generalized weakness  Improved with fluid resuscitation  Continue PT/OT  Cardiac arrhythmia +/- nonsustained ventricular tachycardia  Awaiting ICD interrogation  Toprol/amiodarone  Dehydration  Okay to resume Lasix at this point in time as patient is essentially euvolemic  Dehydration secondary to loose stools prior to admission complicated by patient titrating his Lasix on his own accord  Presently resolved  Combined systolic/diastolic heart failure  Resume Lasix  Atrial fibrillation  Rate controlled, Eliquis on hold for possible intervention pending AICD interrogation    Await AICD interrogation, discharge planning based upon device interrogation    Abner Connell DO  8/21/2022  11:42 AM

## 2022-08-21 NOTE — PROGRESS NOTES
infusion, , IntraVENous, PRN, Hassel Prose, DO    potassium chloride (KLOR-CON M) extended release tablet 40 mEq, 40 mEq, Oral, PRN **OR** potassium bicarb-citric acid (EFFER-K) effervescent tablet 40 mEq, 40 mEq, Oral, PRN **OR** potassium chloride 10 mEq/100 mL IVPB (Peripheral Line), 10 mEq, IntraVENous, PRN, Hassel Prose, DO    magnesium sulfate 1000 mg in dextrose 5% 100 mL IVPB, 1,000 mg, IntraVENous, PRN, Hassel Prose, DO    ondansetron (ZOFRAN-ODT) disintegrating tablet 4 mg, 4 mg, Oral, Q8H PRN **OR** ondansetron (ZOFRAN) injection 4 mg, 4 mg, IntraVENous, Q6H PRN, Hassel Prose, DO    polyethylene glycol (GLYCOLAX) packet 17 g, 17 g, Oral, Daily PRN, Hassel Prose, DO    acetaminophen (TYLENOL) tablet 650 mg, 650 mg, Oral, Q6H PRN **OR** acetaminophen (TYLENOL) suppository 650 mg, 650 mg, Rectal, Q6H PRN, Hassel Prose, DO    lactobacillus (CULTURELLE) capsule 1 capsule, 1 capsule, Oral, TID WC, Abner Jacksone, DO, 1 capsule at 08/21/22 0837   Continuous Infusions:   sodium chloride       CBC:   Recent Labs     08/18/22  1310   WBC 7.3   HGB 11.6*          BMP:    Recent Labs     08/18/22  1310 08/19/22  0521 08/20/22  0839   * 141 137   K 4.4 3.8 4.0   CL 99 105 105   CO2 20 25 22   BUN 29* 26* 20   CREATININE 1.45* 1.41* 1.23*   GLUCOSE 102* 96 116*       Hepatic:   Recent Labs     08/18/22  1310   AST 19   ALT 20   BILITOT 0.68   ALKPHOS 134*       Troponin:   Recent Labs     08/18/22  1310 08/18/22  1730   TROPHS 61* 51*       BNP: No results for input(s): BNP in the last 72 hours. Lipids: No results for input(s): CHOL, HDL in the last 72 hours.     Invalid input(s): LDLCALCU  INR:   Recent Labs     08/19/22  0521   INR 1.4         Objective:   Vitals: /74   Pulse 87   Temp 98.1 °F (36.7 °C)   Resp 20   Ht 5' 9\" (1.753 m)   Wt 154 lb 12.2 oz (70.2 kg)   SpO2 96%   BMI 22.85 kg/m²   General appearance: alert and cooperative with exam  HEENT: Head: Normocephalic, no lesions, without obvious abnormality. Neck: no adenopathy, no carotid bruit, no JVD, supple, symmetrical, trachea midline and thyroid not enlarged, symmetric, no tenderness/mass/nodules  Lungs: clear to auscultation bilaterally  Heart: regular rate and rhythm, S1, S2 normal, no murmur, click, rub or gallop  Abdomen: soft, non-tender; bowel sounds normal; no masses,  no organomegaly  Extremities: extremities normal, atraumatic, no cyanosis or edema  Neurologic: Mental status: Alert, oriented, thought content appropriate    EKG:   Results for orders placed or performed during the hospital encounter of 08/18/22   EKG 12 Lead   Result Value Ref Range    Ventricular Rate 80 BPM    Atrial Rate 66 BPM    QRS Duration 174 ms    Q-T Interval 454 ms    QTc Calculation (Bazett) 523 ms    P Axis 54 degrees    R Axis -109 degrees    T Axis 20 degrees    Narrative    Ventricular-paced rhythm with occasional Premature ventricular complexes  Abnormal ECG  When compared with ECG of 18-AUG-2022 16:45,  Vent.  rate has decreased BY  12 BPM     ECHO:   Results for orders placed or performed during the hospital encounter of 07/21/22   ECHO Complete 2D W Doppler W Color   Result Value Ref Range    Left Ventricular Ejection Fraction 28     LVEF MODALITY ECHO     Narrative    [address] [place] [address]    Transthoracic Echocardiography Report (TTE)     Patient Name Caro Torres       Date of Study                 07/22/2022                Ariel Guzman      Date of      1945  Gender                        Male   Birth      Age          68 year(s)  Race                                Room Number         Height:                       69 inch, 175.26 cm      Corporate ID [id number]    Weight:                       172 pounds, 78 kg   #      Patient Acct [de-identified]   BSA:           1.94 m^2       BMI:      25.4   #                                                                kg/m^2      MR # [id number]     Sonographer                   Raul Jain      Accession #  [id number]  Interpreting Physician        [address] [place] [address]      Fellow                   Referring Nurse Practitioner      Interpreting             Referring Physician           Nikki Cheek     Type of Study      TTE procedure:2D Echocardiogram, M-Mode, Doppler, Color Doppler. Procedure Date  Date: 07/22/2022 Start: 07:18 AM    Study Location: [facility]  Technical Quality: Limited visualization due to poor acoustical window. Indications:Chest pain. History / Tech. Comments:  Procedure explained to patient. History of HDL, HTN, MI, multiple sclerosis,  former smoker, systolic heart failure, peripheral vascular disease,  pacemaker/defibrillator    Patient Status: Inpatient    Height: 69 inches Weight: 172 pounds BSA: 1.94 m^2 BMI: 25.4 kg/m^2    HR: 72 bpm BP: 105/46 mmHg    CONCLUSIONS    Summary  Technically difficult study. Left ventricle is normal in size and normal left ventricular wall thickness. Global left ventricular systolic function is severely reduced Estimated  ejection fraction is 25-30%. Right ventricle is normal in size with reduced function. Mild aortic insufficiency. Mild mitral regurgitation. Mild tricuspid regurgitation. Estimated right ventricular systolic pressure  is 04.76 mmHg. Aortic root and ascending aorta are mildly dilated measuring 3.9 cm. IVC Increased diameter, but still has inspiratory variation suggesting upper  normal or mildly elevated RA filling pressure (i.e. CVP) .     Signature  ----------------------------------------------------------------------------   Electronically signed by Zoe Méndez(Sonographer) on 07/22/2022 08:38 AM  ----------------------------------------------------------------------------    ----------------------------------------------------------------------------   Electronically signed by Ana SantoInterpreting physician) on 2022   02:27 PM  ----------------------------------------------------------------------------  FINDINGS  Left Atrium  Left atrium is normal in size. Inter-atrial septum is intact with no evidence for an atrial septal defect  by color doppler. Left Ventricle  Left ventricle is normal in size and normal left ventricular wall thickness. Global left ventricular systolic function is severely reduced Estimated  ejection fraction is 25-30%. Right Atrium  Right atrium appears normal in size. Pacemaker / ICD lead seen in right atrium. Right Ventricle  Right ventricle is normal in size with reduced function. Pacemaker / ICD lead seen in right ventricle. Mitral Valve  Normal mitral valve structure. Mild mitral regurgitation. No mitral stenosis. Aortic Valve  Aortic leaflet calcification. Mild aortic insufficiency. No aortic stenosis. Tricuspid Valve  The tricuspid valve was not well visualized. Mild tricuspid regurgitation. Estimated right ventricular systolic pressure is 59.99 mmHg. No tricuspid stenosis. Pulmonic Valve  Pulmonic valve not well visualized but Doppler velocities are normal.  No evidence of pulmonic stenosis. Pericardial Effusion  No significant pericardial effusion is seen. Pleural Effusion  No pleural effusion seen. Miscellaneous  Aortic root and ascending aorta are mildly dilated measuring 3.9 cm. IVC Increased diameter, but still has inspiratory variation suggesting upper  normal or mildly elevated RA filling pressure (i.e. CVP) .   E/E' average = 15.95.    M-mode / 2D Measurements & Calculations:      LVIDd:5.5 cm(3.7 - 5.6 cm)       Diastolic BIFQRH:911 ml   JFBS.3 cm(2.2 - 4.0 cm)       Systolic ORWUBR:23.0 ml   IVSd:1.1 cm(0.6 - 1.1 cm)        Aortic Root:3.9 cm(2.0 - 3.7 cm)   LVPWd:1.1 cm(0.6 - 1.1 cm)       LA Dimension: 3.8 cm(1.9 - 4.0 cm)   Fractional Shortenin.36 %    LA volume/Index: 64.8 ml /33m^2   Calculated LVEF (%): 36.17 %     LVOT:2.4 cm RVDd:4.1 cm      Mitral:                                  Aortic      Valve Area (P1/2-Time): 2.82 cm^2        Peak Velocity: 1.59 m/s   Peak E-Wave: 0.80 m/s                    Mean Velocity: 1.09 m/s   Peak A-Wave: 0.81 m/s                    Peak Gradient: 10.11 mmHg   E/A Ratio: 0.99                          Mean Gradient: 6 mmHg   Peak Gradient: 2.56 mmHg   Deceleration Time: 267 msec   P1/2t: 78 msec                           Area (continuity): 1.72 cm^2                                            AV VTI: 32.9 cm      Tricuspid:                               Pulmonic:      Peak TR Velocity: 2.39 m/s               Peak Velocity: 0.78 m/s   Peak TR Gradient: 22.8484 mmHg           Peak Gradient: 2.43 mmHg   Estimated RA Pressure: 10 mmHg                                               Estimated PASP: 32.85 mmHg     Septal Wall E' velocity:0.05 m/s  Lateral Wall E' velocity:0.06 m/s     No results found for this or any previous visit. No results found for this or any previous visit.     Results for orders placed during the hospital encounter of 08/18/22    Echocardiogram Limited 2D Adult    Narrative  [address] [place] [address]    Transthoracic Echocardiography Report (TTE)    Patient Name Paige Loyola       Date of Study               08/19/2022  Keshawn Willy    Date of      1945  Gender                      Male  Birth    Age          68 year(s)  Race                            Room Number         Height:                     69 inch, 175.26 cm    Corporate ID [id number]    Weight:                     157 pounds, 71.2 kg  #    Patient Acct [de-identified]   BSA:          1.86 m^2      BMI:     23.18 kg/m^2  #    MR #         [id number]     Sonographer                 Dolores Guevara    Accession #  [id number]  Interpreting Physician      [address] [place] [address]    Fellow                   Referring Nurse  Practitioner    Interpreting             Referring Physician         Juany Paul  Fellow    Type of Study    TTE procedure:2D Echocardiogram, Limited Echo. Procedure Date  Date: 08/19/2022 Start: 10:24 AM    Study Location: [facility]  Technical Quality: Fair visualization    Indications:Pericardial effusion and Ventricular tachycardia. History / Tech. Comments:  Procedure explained to patient. History of afib, CAD, RCA stent, HDL, MI,  HTN, multiple sclerosis, former smoker, systolic heart failure, peripheral  vascular disease, pacemaker/defibrillator, Vtach    Patient Status: Inpatient    Height: 69 inches Weight: 157 pounds BSA: 1.86 m^2 BMI: 23.18 kg/m^2    Rhythm: Within normal limits HR: 72 bpm    CONCLUSIONS    Summary  A limited echocardiogram was perform to assess pericardial effusion. Left ventricle is normal in size, normal left ventricular wall thickness,  global left ventricular systolic function is severely reduced with a  calculated EF 25.4%. Severe global hypokinesis. Right ventricular dilatation with reduced systolic function. Pacemaker / ICD  lead seen in right ventricle and right atrium. No pericardial effusion seen. Aortic root is mildly dilated measuring 4.0cm. Signature  ----------------------------------------------------------------------------  Electronically signed by Zoe Baldwin(Sonographer) on 08/19/2022 11:24 AM  ----------------------------------------------------------------------------    ----------------------------------------------------------------------------  Electronically signed by Abdi Santo(Interpreting physician) on 08/19/2022  12:57 PM  ----------------------------------------------------------------------------  FINDINGS    Left Ventricle  Left ventricle is normal in size, normal left ventricular wall thickness,  global left ventricular systolic function is severely reduced with a  calculated EF 25.4%. Severe global hypokinesis. Right Atrium  Pacemaker / ICD lead seen in right atrium.   Right Ventricle  Right ventricular dilatation with reduced systolic function. Pacemaker / ICD lead seen in right ventricle. Mitral Valve  The mitral valve opens well. Aortic Valve  Not well visualized but appears to open. Tricuspid Valve  The tricuspid valve opens well. Pulmonic Valve  Pulmonic valve not well visualized. Pericardial Effusion  No pericardial effusion seen. Pleural Effusion  No pleural effusion seen. Miscellaneous  Aortic root is mildly dilated measuring 4.0cm. M-mode / 2D Measurements & Calculations:    LVIDd:5.1 cm(3.7 - 5.6 cm)       Diastolic YGOVPF:837 ml  VLNBI:9.9 cm(2.2 - 4.0 cm)       Systolic HDZGGT:044 ml  TVVH:9.6 cm(0.6 - 1.1 cm)        Aortic Root:4 cm(2.0 - 3.7 cm)  LVPWd:1 cm(0.6 - 1.1 cm)         LA Dimension: 4.1 cm(1.9 - 4.0 cm)  Fractional Shortenin.76 %  Calculated LVEF (%): 25.35 %        Assessment / Acute Cardiac Problems:   Patient Active Problem List:    - Wide QRS on Monitor- Maybe VT other differentials include AF with abberancy- improved on IV Amio  - Known ischemic CM s/p Biv AICD  - Parox AF / Flutter  - Elevated trop due to CKD  - EF 25%  - Known CAD s/p PCI- apparently had PCI to ISR of RCA on 2020 with [facility] per care everywhere    Plan of Treatment:   - now on amio to 200 po bid  - needs AICD interrogation- if it is truly VT will need tx to [facility] for cath during this admission after nephro eval  - if it is AT / AF will treat medically  - increase BB  - hold eliquis pending interrogation     D/w nursing  She is calling [facility] for an interrogation    Thank you for allowing me to participate in the care of this patient, please do not hesitate to call if you have any questions. Yael Carroll DO, [facility], [address], [address], [place] [address] [facility]  [facility]. Amanda Huff DBA [facility]  [phone]

## 2022-08-22 ENCOUNTER — APPOINTMENT (OUTPATIENT)
Dept: ULTRASOUND IMAGING | Age: 77
DRG: 246 | End: 2022-08-22
Payer: MEDICARE

## 2022-08-22 PROBLEM — R53.83 FATIGUE: Status: ACTIVE | Noted: 2022-08-22

## 2022-08-22 LAB
ANION GAP SERPL CALCULATED.3IONS-SCNC: 9 MMOL/L (ref 9–17)
BUN BLDV-MCNC: 22 MG/DL (ref 8–23)
CALCIUM SERPL-MCNC: 8.2 MG/DL (ref 8.6–10.4)
CHLORIDE BLD-SCNC: 107 MMOL/L (ref 98–107)
CO2: 24 MMOL/L (ref 20–31)
COMPLEMENT C3: 153 MG/DL (ref 90–180)
COMPLEMENT C4: 33 MG/DL (ref 10–40)
CREAT SERPL-MCNC: 1.32 MG/DL (ref 0.7–1.2)
CREATININE URINE: 122 MG/DL (ref 39–259)
FREE KAPPA/LAMBDA RATIO: 1.76 (ref 0.26–1.65)
GFR AFRICAN AMERICAN: >60 ML/MIN
GFR NON-AFRICAN AMERICAN: 53 ML/MIN
GFR SERPL CREATININE-BSD FRML MDRD: ABNORMAL ML/MIN/{1.73_M2}
GLUCOSE BLD-MCNC: 100 MG/DL (ref 70–99)
KAPPA FREE LIGHT CHAINS QNT: 3.26 MG/DL (ref 0.37–1.94)
LAMBDA FREE LIGHT CHAINS QNT: 1.85 MG/DL (ref 0.57–2.63)
POTASSIUM SERPL-SCNC: 4.2 MMOL/L (ref 3.7–5.3)
SODIUM BLD-SCNC: 140 MMOL/L (ref 135–144)
TOTAL PROTEIN, URINE: 33 MG/DL

## 2022-08-22 PROCEDURE — 2580000003 HC RX 258: Performed by: HOSPITALIST

## 2022-08-22 PROCEDURE — 97110 THERAPEUTIC EXERCISES: CPT

## 2022-08-22 PROCEDURE — 86160 COMPLEMENT ANTIGEN: CPT

## 2022-08-22 PROCEDURE — 6370000000 HC RX 637 (ALT 250 FOR IP): Performed by: CLINICAL NURSE SPECIALIST

## 2022-08-22 PROCEDURE — 82570 ASSAY OF URINE CREATININE: CPT

## 2022-08-22 PROCEDURE — 80048 BASIC METABOLIC PNL TOTAL CA: CPT

## 2022-08-22 PROCEDURE — 1200000000 HC SEMI PRIVATE

## 2022-08-22 PROCEDURE — 6370000000 HC RX 637 (ALT 250 FOR IP): Performed by: HOSPITALIST

## 2022-08-22 PROCEDURE — 99232 SBSQ HOSP IP/OBS MODERATE 35: CPT | Performed by: PHYSICIAN ASSISTANT

## 2022-08-22 PROCEDURE — 36415 COLL VENOUS BLD VENIPUNCTURE: CPT

## 2022-08-22 PROCEDURE — 86334 IMMUNOFIX E-PHORESIS SERUM: CPT

## 2022-08-22 PROCEDURE — 76770 US EXAM ABDO BACK WALL COMP: CPT

## 2022-08-22 PROCEDURE — 84165 PROTEIN E-PHORESIS SERUM: CPT

## 2022-08-22 PROCEDURE — 83883 ASSAY NEPHELOMETRY NOT SPEC: CPT

## 2022-08-22 PROCEDURE — 6370000000 HC RX 637 (ALT 250 FOR IP): Performed by: PHYSICIAN ASSISTANT

## 2022-08-22 PROCEDURE — 84156 ASSAY OF PROTEIN URINE: CPT

## 2022-08-22 PROCEDURE — 2580000003 HC RX 258: Performed by: NURSE PRACTITIONER

## 2022-08-22 PROCEDURE — 51798 US URINE CAPACITY MEASURE: CPT

## 2022-08-22 PROCEDURE — 84155 ASSAY OF PROTEIN SERUM: CPT

## 2022-08-22 PROCEDURE — 99222 1ST HOSP IP/OBS MODERATE 55: CPT | Performed by: INTERNAL MEDICINE

## 2022-08-22 PROCEDURE — 97530 THERAPEUTIC ACTIVITIES: CPT

## 2022-08-22 PROCEDURE — 2580000003 HC RX 258: Performed by: CLINICAL NURSE SPECIALIST

## 2022-08-22 RX ORDER — 0.9 % SODIUM CHLORIDE 0.9 %
250 INTRAVENOUS SOLUTION INTRAVENOUS ONCE
Status: COMPLETED | OUTPATIENT
Start: 2022-08-22 | End: 2022-08-22

## 2022-08-22 RX ORDER — SODIUM CHLORIDE 9 MG/ML
INJECTION, SOLUTION INTRAVENOUS CONTINUOUS
Status: DISCONTINUED | OUTPATIENT
Start: 2022-08-22 | End: 2022-08-23

## 2022-08-22 RX ORDER — ASPIRIN 81 MG/1
81 TABLET ORAL DAILY
Status: DISCONTINUED | OUTPATIENT
Start: 2022-08-22 | End: 2022-08-24 | Stop reason: HOSPADM

## 2022-08-22 RX ADMIN — AMIODARONE HYDROCHLORIDE 200 MG: 200 TABLET ORAL at 08:26

## 2022-08-22 RX ADMIN — TAMSULOSIN HYDROCHLORIDE 0.4 MG: 0.4 CAPSULE ORAL at 20:34

## 2022-08-22 RX ADMIN — Medication 81 MG: at 17:16

## 2022-08-22 RX ADMIN — AMIODARONE HYDROCHLORIDE 200 MG: 200 TABLET ORAL at 20:34

## 2022-08-22 RX ADMIN — PANTOPRAZOLE SODIUM 40 MG: 40 TABLET, DELAYED RELEASE ORAL at 05:46

## 2022-08-22 RX ADMIN — SODIUM CHLORIDE: 9 INJECTION, SOLUTION INTRAVENOUS at 23:19

## 2022-08-22 RX ADMIN — METOPROLOL SUCCINATE 50 MG: 50 TABLET, FILM COATED, EXTENDED RELEASE ORAL at 08:26

## 2022-08-22 RX ADMIN — ROSUVASTATIN CALCIUM 20 MG: 20 TABLET, FILM COATED ORAL at 05:46

## 2022-08-22 RX ADMIN — Medication 1 CAPSULE: at 11:41

## 2022-08-22 RX ADMIN — Medication 1 CAPSULE: at 17:15

## 2022-08-22 RX ADMIN — SODIUM CHLORIDE: 9 INJECTION, SOLUTION INTRAVENOUS at 12:14

## 2022-08-22 RX ADMIN — SODIUM CHLORIDE 250 ML: 9 INJECTION, SOLUTION INTRAVENOUS at 04:29

## 2022-08-22 RX ADMIN — Medication 1 CAPSULE: at 08:26

## 2022-08-22 RX ADMIN — SODIUM CHLORIDE, PRESERVATIVE FREE 10 ML: 5 INJECTION INTRAVENOUS at 08:27

## 2022-08-22 NOTE — PROGRESS NOTES
Physical Therapy  Facility/Department: [facility] CAR 2- STEPDOWN  Physical Therapy Daily Treatment Note    Name: Raúl Ingram  : 1945  MRN: [id number]  Date of Service: 2022    Discharge Recommendations:  Patient would benefit from continued therapy after discharge   PT Equipment Recommendations  Equipment Needed: No  Other: Pt has electric scooter at home and is his primary means of mobility. Patient Diagnosis(es): The primary encounter diagnosis was Fatigue, unspecified type. Diagnoses of Diarrhea, unspecified type, Renal insufficiency, and Elevated troponin were also pertinent to this visit. Past Medical History:  has a past medical history of Abdominal aortic aneurysm without rupture (HCC), Atrial flutter ([address] Chronic atrial fibrillation, unspecified ([address] Chronic kidney disease, Combined systolic and diastolic heart failure ([address] Diverticulosis large intestine w/o perforation or abscess w/bleeding, Hyperlipidemia, Hypotension, Ischemic cardiomyopathy, MI (myocardial infarction) ([address] MS (multiple sclerosis) ([address] Multiple sclerosis ([address] Pacemaker, artificial, and Urinary retention. Past Surgical History:  has a past surgical history that includes pacemaker placement and Coronary angioplasty with stent. Assessment   Body Structures, Functions, Activity Limitations Requiring Skilled Therapeutic Intervention: Decreased functional mobility ; Decreased strength;Decreased safe awareness;Decreased endurance;Decreased balance  Assessment: Pt presents with generalized weakness and overall deconditioning with impaired mobility. Pt performed bed mobility with CGA, and was able to sit EOB with SBA, but declined to attempt any transfers. Pt is currently unsafe to return to prior living arrangement and would require 24/7 assistance. Pt would benefit from further therapy.   Therapy Prognosis: Good  Requires PT Follow-Up: Yes  Activity Tolerance  Activity Tolerance: Patient tolerated treatment well;Patient limited by fatigue     Plan   Plan  Plan:  (5-6x/week)  Current Treatment Recommendations: Strengthening, Balance training, Functional mobility training, Transfer training, Endurance training, Patient/Caregiver education & training, Safety education & training, Therapeutic activities  Safety Devices  Type of Devices: Call light within reach, Left in bed, Nurse notified, All fall risk precautions in place, Patient at risk for falls  Restraints  Restraints Initially in Place: No     Restrictions  Restrictions/Precautions  Restrictions/Precautions: Fall Risk  Required Braces or Orthoses?: No     Subjective   General  Chart Reviewed: Yes  Patient assessed for rehabilitation services?: Yes  Response To Previous Treatment: Patient with no complaints from previous session. Family / Caregiver Present: No  Follows Commands: Within Functional Limits  General Comment  Comments: Pt returned to supine in bed at end of session with call light in reach. Subjective  Subjective: Pt and RN agreeable to PT this morning. Pt supine in bed upon arrival with no c/o pain. Pt pleasant and cooperative throughout session. Cognition   Orientation  Overall Orientation Status: Within Functional Limits  Cognition  Overall Cognitive Status: Exceptions  Arousal/Alertness: Delayed responses to stimuli  Following Commands: Follows one step commands with increased time; Follows one step commands with repetition  Attention Span: Attends with cues to redirect  Safety Judgement: Decreased awareness of need for assistance  Insights: Decreased awareness of deficits  Initiation: Requires cues for some  Sequencing: Requires cues for some     Objective   Bed mobility  Rolling to Left: Contact guard assistance  Supine to Sit: Contact guard assistance  Sit to Supine: Contact guard assistance  Scooting: Contact guard assistance  Bed Mobility Comments: Significant increased time/effort to perform bed mobility.   Transfers  Sit to Stand: Unable to assess  Stand to sit: Unable to assess  Comment: Pt declined to attempt STS/SPT transfers. Ambulation  Comments: Pt is nonambulatory. More Ambulation?: No     Balance  Posture: Fair  Sitting - Static: Good  Sitting - Dynamic: Fair;+  Standing - Static: Poor  Standing - Dynamic: Poor  Comments: Pt sat EOB ~17 minutes with SBA to perform seated LE/UE exercises. Exercise Treatment:  Seated LE exercise program: Long Arc Quads, hip abduction/adduction, heel/toe raises, and marches. Reps: 2 sets of 10  Upper extremity exercises: Wrist flexion/extension, bicep curl, shoulder flexion/extension, punches, shoulder abduction/adduction. Reps: 2 sets of 10  Comments: Pt performed while seated EOB, required frequent rest breaks for endurance recovery d/t increasing RR. AM-PAC Score  AM-PAC Inpatient Mobility Raw Score : 10 (08/22/22 1131)  -PAC Inpatient T-Scale Score : 32.29 (08/22/22 1131)  Mobility Inpatient CMS 0-100% Score: 76.75 (08/22/22 1131)  Mobility Inpatient CMS G-Code Modifier : CL (08/22/22 1131)      Goals  Short Term Goals  Time Frame for Short term goals: 14 visits  Short term goal 1: Pt to be Modified (I) with stand pivot transfers with modifications as needed without LOB. Short term goal 2: Pt to tolerate 30 minutes of therapy activity to improve strength and endurance for mobility. Short term goal 3: Improve standing static/dynamic balance to Fair+ during transfers to minimize fall risk with mobility.   Patient Goals   Patient goals : Return to SNF for further strengthening       Education  Patient Education  Education Given To: Patient  Education Provided: Plan of Care;Home Exercise Program;Role of Therapy;Transfer Training  Education Method: Verbal  Barriers to Learning: None  Education Outcome: Verbalized understanding      Therapy Time   Individual Concurrent Group Co-treatment   Time In 1106         Time Out 1130         Minutes 24         Timed Code Treatment Minutes: 24 Minutes Flex Safe, PTA

## 2022-08-22 NOTE — CARE COORDINATION
Transitional Planning  Reviewed previous cm notes referrals sent to SNF looking for long term bed. Spoke with pt he does not want long term bed however he has used his 21 day stay recently with [facility]. Left  for [address] [phone] await call back        LISSA AGUILAR from [place] called back and states the pt had agreed to long term care as he was not able to care for himself after d/c from SNF. He was only home for about 1 1/2 days. Returned to talk with pt and he agreed that he cannot take care of himself. Explained [facility] would charge $570 a month for him to go there. He states that's his first choice and he agrees with that amount per month. Called Rosy Chin back at [phone] and informed her he has agreed. She states she has already spoke with his dtr and made those arrangements.   Plan t discharge will be [facility]

## 2022-08-22 NOTE — CONSULTS
Renal Consult Note    Patient :  Adrienne Sharma; 68 y.o. MRN# [id number]  Location:  2008/2008-01  Attending:  Tana Huddleston DO  Admit Date:  8/18/2022   Hospital Day: 3    Reason for Consult:     Asked by Dr Tana Huddleston DO to see for SANNA/Elevated Creatinine. History Obtained From:     Patient/chart    History of Present Illness:     Adrienne Sharma; 68 y.o. male with past medical history as mentioned below. Known history of multiple sclerosis with significant disability leading to motor weakness, coronary artery disease history of stent placement in the past, ischemic cardiomyopathy ejection fraction of 25 to 30%, chronic kidney disease stage III baseline creatinine 1.2-1.4 has not seen a nephrologist.  Most of his care is through the [facility] system. He also has an AICD device placed in the past.  Patient tells me that over the last 3 to 4 weeks he has been extremely weak, having hard time ambulation was having a hard time keeping his head up as well. He also had intermittent diarrhea. Was initially admitted to [facility] early part of this month. Was hydrated and then sent to [facility]. He was then discharged home and stayed home for 2 days and then developed the same symptoms again prompting him to come back to [facility]. During his stay there he was found to have monomorphic ventricular tachycardia which was confirmed by [facility] after evaluating his AICD rhythm. He was then transferred to [facility] for cardiac cath. Echocardiogram done earlier showed an ejection fraction of 25%. He had a slight bump in his troponin to 108. His creatinine was found to be 1.3 which is likely his baseline. He is never seen a nephrologist before although he has been told by the [facility] physicians that there is an element of kidney disease. He does have history of nocturia gets up to 3-4 times a night.   Also had history of retention during his previous admission to [place] [facility] required Rosas catheter at one time. Subsequently tells me that he was put on medications which helped improve his urination. Denies any history of using catheters to drain urine or retention on a regular basis. No history of bowel or bladder incontinence. No history of lower extremity edema. Does have intermittent shortness of breath. No cough phlegm or hemoptysis. No fever or chills. Labs 8/22/2022: Sodium 140 potassium 4.2 creatinine 1.3 glucose 100 calcium 8.2 albumin 3.4    No history of recent contrast exposure, No h/o prolonged NSAIDs use in the past, No h/o nephrolithiasis, No recent skin rashes or arthralgias, No hematuria or pyuria noticed in the recent past. Doesn't report any reduction in the urine output recently. Non report of any obstructive urinary symptoms (urgency, frequency, weak stream, straining while urination). No h/o recurrent UTIs in the past.    Past History/Allergies? Social History:     Past Medical History:   Diagnosis Date    Abdominal aortic aneurysm without rupture (HCC)     Atrial flutter (HCC)     Chronic atrial fibrillation, unspecified (HCC)     Chronic kidney disease     Combined systolic and diastolic heart failure (HCC)     Diverticulosis large intestine w/o perforation or abscess w/bleeding     Hyperlipidemia     Hypotension     Ischemic cardiomyopathy     MI (myocardial infarction) ([facility]     MS (multiple sclerosis) ([facility]     Multiple sclerosis ([facility]     Pacemaker, artificial     Urinary retention        Allergies   Allergen Reactions    Codeine     Doxycycline     Erythromycin     Gammagard [Immune Globulin]     Sulfa Antibiotics Hives       Social History     Socioeconomic History    Marital status:       Spouse name: Not on file    Number of children: Not on file    Years of education: Not on file    Highest education level: Not on file   Occupational History    Not on file   Tobacco Use    Smoking status: Former    Smokeless tobacco: Never   Substance and Sexual Activity    Alcohol use: No    Drug use: No    Sexual activity: Not on file   Other Topics Concern    Not on file   Social History Narrative    Not on file     Social Determinants of Health     Financial Resource Strain: Not on file   Food Insecurity: Not on file   Transportation Needs: Not on file   Physical Activity: Not on file   Stress: Not on file   Social Connections: Not on file   Intimate Partner Violence: Not on file   Housing Stability: Not on file       Family History:      History reviewed. No pertinent family history. Outpatient Medications:     Medications Prior to Admission: [DISCONTINUED] atorvastatin (LIPITOR) 40 MG tablet, Take 40 mg by mouth daily  tamsulosin (FLOMAX) 0.4 MG capsule, Take 1 capsule by mouth nightly  lisinopril (PRINIVIL;ZESTRIL) 2.5 MG tablet, Take 1 tablet by mouth in the morning. [DISCONTINUED] metoprolol succinate (TOPROL XL) 25 MG extended release tablet, Take 0.5 tablets by mouth in the morning. apixaban (ELIQUIS) 5 MG TABS tablet, Take by mouth 2 times daily  albuterol sulfate HFA (PROVENTIL;VENTOLIN;PROAIR) 108 (90 Base) MCG/ACT inhaler, Inhale 2 puffs into the lungs every 6 hours as needed for Wheezing  Rosuvastatin Calcium 20 MG CPSP, Take by mouth Daily  nitroGLYCERIN (NITROSTAT) 0.4 MG SL tablet, Place 0.4 mg under the tongue every 5 minutes as needed for Chest pain up to max of 3 total doses. If no relief after 1 dose, call 911.  acetaminophen (TYLENOL) 325 MG tablet, Take 650 mg by mouth every 6 hours as needed for Pain  omeprazole (PRILOSEC) 20 MG delayed release capsule, Take 20 mg by mouth Daily  [DISCONTINUED] chlorhexidine (HIBICLENS) 4 % external liquid, Apply topically daily as needed Apply topically daily as needed.     Current Medications:     Scheduled Meds:    aspirin  81 mg Oral Daily    amiodarone  200 mg Oral BID    metoprolol succinate  50 mg Oral Daily    [Held by provider] apixaban  5 mg Oral BID    [Held by provider] furosemide  40 mg Oral Daily    [Held by provider] lisinopril  2.5 mg Oral Daily    pantoprazole  40 mg Oral QAM AC    rosuvastatin  20 mg Oral Daily    tamsulosin  0.4 mg Oral Nightly    sodium chloride flush  5-40 mL IntraVENous 2 times per day    lactobacillus  1 capsule Oral TID WC     Continuous Infusions:    sodium chloride 75 mL/hr at 22 1214    sodium chloride       PRN Meds:  acetaminophen, albuterol sulfate HFA, nitroGLYCERIN, sodium chloride flush, sodium chloride, potassium chloride **OR** potassium alternative oral replacement **OR** potassium chloride, magnesium sulfate, ondansetron **OR** ondansetron, polyethylene glycol, acetaminophen **OR** acetaminophen    Review of Systems:     Constitutional: No fever, no chills, generalized lethargy and weakness as written above  HEENT:  No headache, otalgia, itchy eyes, nasal discharge or sore throat. Cardiac:  No chest pain, dyspnea, orthopnea or PND. Chest:  No cough, phlegm or wheezing. Abdomen:  No abdominal pain, nausea or vomiting. Neuro:  No focal weakness, abnormal movements orseizure like activity. Skin:   No rashes, no itching. :   No hematuria, no pyuria, no dysuria, no flank pain. Extremities:  No swelling or joint pains. ROS was otherwise negative except as mentioned in the [address] Input/Output:       I/O last 3 completed shifts:   In: 120 [P.O.:120]  Out: 575 [Urine:575]  Patient Vitals for the past 96 hrs (Last 3 readings):   Weight   22 1815 156 lb 1.4 oz (70.8 kg)   22 0731 154 lb 12.2 oz (70.2 kg)   22 0423 153 lb 12.5 oz (69.8 kg)      Vital Signs:   Temperature:  Temp: 97.5 °F (36.4 °C)  TMax:   Temp (24hrs), Av.1 °F (36.7 °C), Min:97.5 °F (36.4 °C), Max:98.6 °F (37 °C)    Respirations:  Resp: 20  Pulse:   Heart Rate: 68  BP:    BP: 111/63  BP Range: Systolic (90IZJ), BRC:12 , Min:67 , MDW:052       Diastolic (77GYR), TVW:52, Min:50, Max:72      Physical Examination:     General:  AAO x 3, speaking in full sentences, no accessory muscle use. HEENT: Atraumatic, normocephalic, no throat congestion, moist mucosa. Eyes:   Pupils equal, round and reactive to light, EOMI. Neck:   No JVD, no thyromegaly, no lymphadenopathy. Chest:  Bilateral vesicular breath sounds, no rales or wheezes. Cardiac:  S1 S2 RR, no murmurs, gallops or rubs, JVP not raised. Abdomen: Soft, non-tender, no masses or organomegaly, BS audible. :   No suprapubic or flank tenderness. Neuro:  Awake, clumsy movement in both upper extremities, significant distal muscle weakness noted  SKIN:  No rashes, good skin turgor. Extremities:  No edema, palpable peripheral pulses, no calf tenderness. Labs:     No results for input(s): WBC, RBC, HGB, HCT, MCV, MCH, MCHC, RDW, PLT, MPV in the last 72 hours. BMP:   Recent Labs     08/20/22  0839 08/22/22  0821    140   K 4.0 4.2    107   CO2 22 24   BUN 20 22   CREATININE 1.23* 1.32*   GLUCOSE 116* 100*   CALCIUM 8.8 8.2*      Phosphorus:   No results for input(s): PHOS in the last 72 hours. Magnesium:  No results for input(s): MG in the last 72 hours. Albumin:  No results for input(s): LABALBU in the last 72 hours.   BNP:    No results found for: BNP  JANAE:    No results found for: JANAE  SPEP:  Lab Results   Component Value Date/Time    PROT 7.0 08/18/2022 01:10 PM     UPEP:   No results found for: LABPE  C3:   No results found for: C3  C4:   No results found for: C4  MPO ANCA:   No results found for: MPO  PR3 ANCA:   No results found for: PR3  Anti-GBM:   No results found for: GBMABIGG  Hep BsAg:       No results found for: HEPBSAG  Hep C AB:        No results found for: HEPCAB    Urinalysis/Chemistries:      Lab Results   Component Value Date/Time    NITRU NEGATIVE 07/27/2022 05:00 PM    COLORU Yellow 07/27/2022 05:00 PM    PHUR 5.5 07/27/2022 05:00 PM    WBCUA 2 TO 5 07/27/2022 05:00 PM    RBCUA 2 TO 5 07/27/2022 05:00 PM    MUCUS 1+ 07/27/2022 05:00 PM    BACTERIA FEW 07/27/2022 05:00 PM    SPECGRAV 1.025 07/27/2022 05:00 PM    LEUKOCYTESUR NEGATIVE 07/27/2022 05:00 PM    UROBILINOGEN Normal 07/27/2022 05:00 PM    BILIRUBINUR NEGATIVE 07/27/2022 05:00 PM    GLUCOSEU NEGATIVE 07/27/2022 05:00 PM    KETUA NEGATIVE 07/27/2022 05:00 PM    AMORPHOUS 1+ 07/27/2022 05:00 PM     Urine Sodium:   No results found for: OBDULIO  Urine Potassium:  No results found for: KUR  Urine Chloride:  No results found for: CLUR  Urine Osmolarity: No results found for: OSMOU  Urine Protein:   No results found for: TPU  Urine Creatinine:   No results found for: LABCREA  UPC:     Urine Eosinophils:  No components found for: UEOS    Radiology:     CXR:     Assessment:     1. Chronic kidney disease stage III secondary to ischemic nephrosclerosis likely from cardiorenal syndrome, baseline creatinine 1.2-1.4. Element of obstructive uropathy not ruled out particularly as he has Multiple sclerosis and history of bladder dysfunction  2. Nonsustained monomorphic ventricular tachycardia in a patient with previous history of PCI's and severe cardiomyopathy plan for cardiac aspiration today  3. Ischemic cardiomyopathy ejection fraction 25 to 30% with compensated CHF  4. Generalized weakness multifactorial  5. Non-ST elevation MI based on positive troponins    Plan:   1. Continue normal saline at 75 mill an hour for 6 hours post-cath  2.  Risk of contrast nephropathy explained  3. Acceptable nephrological risk for contrast exposure  3. Will Check Renal Ultrasound to r/o element of obstruction and to assess the kidney size/echotexture. 4. Comprehensive urine testing including Urinalysis, Urine sodium, potassium, chloride, Urine protein and creatinine to quantify the proteinuria if any at all. Will check urinary eosinophils as well. 5. Will Order serum and urine protein electrophoresis to r/o element to occult paraprotein disease. 6. Will order  JANAE, ANCA, Complement levels.   8.  Keep systolic pressure more than 110, agree with holding lisinopril for now, no need for diuretics  9. Check bladder scan and put Rosas if more than 200 mL of postvoid residual  10. We will follow with you    Nutrition   Please ensure that patient is on a renal diet/TF. Avoid nephrotoxic drugs/contrast exposure. Thank you for the consultation. Please do not hesitate to contact us for any further questions/concerns. We will continue to follow along with you.

## 2022-08-22 NOTE — PLAN OF CARE
Problem: Discharge Planning  Goal: Discharge to home or other facility with appropriate resources  Outcome: Progressing     Problem: Safety - Adult  Goal: Free from fall injury  Outcome: Progressing     Problem: Skin/Tissue Integrity  Goal: Absence of new skin breakdown  Description: 1. Monitor for areas of redness and/or skin breakdown  2. Assess vascular access sites hourly  3. Every 4-6 hours minimum:  Change oxygen saturation probe site  4. Every 4-6 hours:  If on nasal continuous positive airway pressure, respiratory therapy assess nares and determine need for appliance change or resting period.   Outcome: Progressing     Problem: Respiratory - Adult  Goal: Achieves optimal ventilation and oxygenation  Outcome: Progressing

## 2022-08-22 NOTE — PROGRESS NOTES
[facility]  Office: [address], DO, José Arlet, DO, Maximilian Salgado, DO, Trisha Santos Blood, DO, Asiya Caballero MD, Pedro Quinn MD, Jason Markham MD, Georges Sutherland MD,  Kat Baeza MD, Bee Campos MD, Liam Laureano, DO, Savanna Callahan MD,  Unknown MD Anam, Laurie Gutierrez MD, Darci Torres, DO, Alla Magallon MD, Severiano Border, MD, Sahil Aranda MD, Mili Ortega, DO, Bianca De La Torre MD, Soumya Reyes MD, Billy Barragan, CNP,  Aishwarya Walls, CNP, Cara Rivero, CNP, Reba Worley, CNP, Masha Fuchs, PA-C, Almas Griggs, [facility], Ramya Vaughn, CNP, Minerva Light, CNP, Fco Shelton, [facility], Semaj Paul, CNP, Binu Butterfield, CNP, Orquidea Lucia, CNS, Pati Flynn, [facility], Claudell Belfast, [facility], Camp Hearing, CNP, Barbi Velasquez, [facility]           [address]    Progress Note    8/22/2022    12:39 PM    Name:   Andrae Prado  MRN:     [id number]     [place]:      [de-identified]   Room:   2008/2008-01   Day:  3  Admit Date:  8/18/2022 11:58 AM    PCP:   Antonio Victoria MD  Code Status:  Full Code    Subjective:     C/C:   Chief Complaint   Patient presents with    Fatigue     X3-4 days since the diarrhea- pt has been home for  days-prior to that he was @ [facility]. after an MI April 2022    Diarrhea     X3 days - denies any N/V     Interval History Status: improved. Patient seen and evaluated this morning. He is not currently reporting any chest pain. He has no new complaints. He is awaiting evaluation by cardiology for potential cardiac catheterization. Brief History: This is a 70-year-old male with past medical history significant for multiple sclerosis, ischemic cardiomyopathy status post AICD placement, history of coronary artery disease status post PCI. Initially presented to outside hospital for weakness and fatigue.   His AICD was interrogated and revealing ventricular tachycardia prompting his transfer to [address] [place] [address]. He has been evaluated by cardiology who is recommending cardiac catheterization. Review of Systems:     Constitutional:  negative for chills, fevers, sweats  Respiratory:  negative for cough, dyspnea on exertion, shortness of breath, wheezing  Cardiovascular:  negative for chest pain, chest pressure/discomfort, lower extremity edema, palpitations  Gastrointestinal:  negative for abdominal pain, constipation, diarrhea, nausea, vomiting  Neurological:  negative for dizziness, headache    Medications: Allergies:     Allergies   Allergen Reactions    Codeine     Doxycycline     Erythromycin     Gammagard [Immune Globulin]     Sulfa Antibiotics Hives       Current Meds:   Scheduled Meds:    amiodarone  200 mg Oral BID    metoprolol succinate  50 mg Oral Daily    [Held by provider] apixaban  5 mg Oral BID    [Held by provider] furosemide  40 mg Oral Daily    [Held by provider] lisinopril  2.5 mg Oral Daily    pantoprazole  40 mg Oral QAM AC    rosuvastatin  20 mg Oral Daily    tamsulosin  0.4 mg Oral Nightly    sodium chloride flush  5-40 mL IntraVENous 2 times per day    lactobacillus  1 capsule Oral TID WC     Continuous Infusions:    sodium chloride 75 mL/hr at 08/22/22 1214    sodium chloride       PRN Meds: acetaminophen, albuterol sulfate HFA, nitroGLYCERIN, sodium chloride flush, sodium chloride, potassium chloride **OR** potassium alternative oral replacement **OR** potassium chloride, magnesium sulfate, ondansetron **OR** ondansetron, polyethylene glycol, acetaminophen **OR** acetaminophen    Data:     Past Medical History:   has a past medical history of Abdominal aortic aneurysm without rupture ([facility] flutter ([facility] Chronic atrial fibrillation, unspecified ([facility] Chronic kidney disease, Combined systolic and diastolic heart failure ([facility] Diverticulosis large intestine w/o perforation or abscess w/bleeding, Hyperlipidemia, Hypotension, Ischemic cardiomyopathy, MI (myocardial infarction) ([facility] MS (multiple sclerosis) ([facility] Multiple sclerosis ([facility] Pacemaker, artificial, and Urinary retention. Social History:   reports that he has quit smoking. He has never used smokeless tobacco. He reports that he does not drink alcohol and does not use drugs. Family History: History reviewed. No pertinent family history. Vitals:  /63   Pulse 68   Temp 97.5 °F (36.4 °C) (Oral)   Resp 20   Ht 5' 9\" (1.753 m)   Wt 156 lb 1.4 oz (70.8 kg)   SpO2 97%   BMI 23.05 kg/m²   Temp (24hrs), Av.1 °F (36.7 °C), Min:97.5 °F (36.4 °C), Max:98.6 °F (37 °C)    No results for input(s): POCGLU in the last 72 hours. I/O (24Hr): Intake/Output Summary (Last 24 hours) at 2022 1239  Last data filed at 2022 0826  Gross per 24 hour   Intake 10 ml   Output 575 ml   Net -565 ml       Labs:  Hematology:No results for input(s): WBC, RBC, HGB, HCT, MCV, MCH, MCHC, RDW, PLT, MPV, SEDRATE, CRP, INR, DDIMER, NP0UVTDY, LABABSO in the last 72 hours. Invalid input(s): PT  Chemistry:  Recent Labs     22  0839 22  0821    140   K 4.0 4.2    107   CO2 22 24   GLUCOSE 116* 100*   BUN 20 22   CREATININE 1.23* 1.32*   ANIONGAP 10 9   LABGLOM 57* 53*   GFRAA >60 >60   CALCIUM 8.8 8.2*   No results for input(s): PROT, LABALBU, LABA1C, T9GILJA, V4QIZID, FT4, TSH, AST, ALT, LDH, GGT, ALKPHOS, LABGGT, BILITOT, BILIDIR, AMMONIA, AMYLASE, LIPASE, LACTATE, CHOL, HDL, LDLCHOLESTEROL, CHOLHDLRATIO, TRIG, VLDL, PSQ98BY, PHENYTOIN, PHENYF, URICACID, POCGLU in the last 72 hours.   ABG:No results found for: POCPH, PHART, PH, POCPCO2, HKB6XVA, PCO2, POCPO2, PO2ART, PO2, POCHCO3, OPS8OUO, HCO3, NBEA, PBEA, BEART, BE, THGBART, THB, NOX1QBO, DVTZ7KJO, H3TUAKPQ, O2SAT, FIO2  Lab Results   Component Value Date/Time    SPECIAL 1ml left arm 2022 01:20 PM     Lab Results   Component Value Date/Time    CULTURE NO GROWTH 3 DAYS 2022 01:20 PM       Radiology:  XR CHEST PORTABLE    Result Date: 8/18/2022  No acute process. Physical Examination:        General appearance:  alert, cooperative and no distress  Mental Status:  oriented to person, place and time and normal affect  Lungs:  clear to auscultation bilaterally, normal effort  Heart:  regular rate and rhythm, no murmur  Abdomen:  soft, nontender, nondistended, normal bowel sounds, no masses, hepatomegaly, splenomegaly  Extremities:  no edema, redness, tenderness in the calves  Skin:  no gross lesions, rashes, induration    Assessment:        Hospital Problems             Last Modified POA    * (Principal) Weakness 8/18/2022 Yes    NSVT (nonsustained ventricular tachycardia) ([facility] 8/19/2022 Yes    Dehydration 8/19/2022 Yes    Combined systolic and diastolic cardiac dysfunction 8/19/2022 Yes    Longstanding persistent atrial fibrillation ([address] 8/19/2022 Yes    Monomorphic ventricular tachycardia ([facility] 8/21/2022 Yes       Plan:        #Ventricular tachycardia  -AICD interrogation revealing multiple runs of ventricular tachycardia. Cardiology planning for cardiac catheterization. Continue oral amiodarone as ordered. #Longstanding persistent atrial fibrillation  -Eliquis on hold for potential cardiac catheterization. Continue metoprolol and amiodarone as ordered. #Multiple sclerosis  -Stable    #Ischemic cardiomyopathy, mixed HFpEF/HFrEF  -Continue goal-directed medical therapy as ordered. Lasix on hold. We will start aspirin 81 mg daily given history of stent placement. #CKD stage IIIa  -Creatinine currently at baseline. PT and OT assessment  Disposition: We will likely need skilled nursing facility at the time of discharge. Anticipate discharge within 48 hours.     Marin Soler PA-C  8/22/2022  12:39 PM

## 2022-08-22 NOTE — PLAN OF CARE
Problem: Discharge Planning  Goal: Discharge to home or other facility with appropriate resources  8/22/2022 1042 by Shahid Woods RN  Outcome: Progressing  8/22/2022 1033 by Shahid Woods RN  Outcome: Progressing  8/22/2022 0118 by Benjamin Faria RN  Outcome: Progressing     Problem: Safety - Adult  Goal: Free from fall injury  8/22/2022 1042 by Shahid Woods RN  Outcome: Progressing  8/22/2022 1033 by Shahid Woods RN  Outcome: Progressing  8/22/2022 0118 by Benjamin Faria RN  Outcome: Progressing     Problem: Skin/Tissue Integrity  Goal: Absence of new skin breakdown  Description: 1. Monitor for areas of redness and/or skin breakdown  2. Assess vascular access sites hourly  3. Every 4-6 hours minimum:  Change oxygen saturation probe site  4. Every 4-6 hours:  If on nasal continuous positive airway pressure, respiratory therapy assess nares and determine need for appliance change or resting period.   8/22/2022 1042 by Shahid Woods RN  Outcome: Progressing  8/22/2022 1033 by Shahid Woods RN  Outcome: Progressing  8/22/2022 0118 by Benjamin Faria RN  Outcome: Progressing     Problem: Respiratory - Adult  Goal: Achieves optimal ventilation and oxygenation  8/22/2022 1042 by Shahid Woods RN  Outcome: Progressing  8/22/2022 1033 by Shahid Woods RN  Outcome: Progressing  8/22/2022 0118 by Benjamin Faria RN  Outcome: Progressing

## 2022-08-22 NOTE — PROGRESS NOTES
[facility]   Progress Note                   Date:   8/22/2022  Patient name: Philly Pedro  Date of admission:  8/18/2022 11:58 AM  MRN:   [id number]  YOB: 1945  PCP: Jaycob Malhotra MD    Reason for Admission:     Subjective:       Clinical Changes / Abnormalities:  Seen and examined alone in room after discussion with RN. Denies any CP or SOB. Tele remains SR with PVCs. Currently NPO pending nephro eval for cath.        Medications:   Scheduled Meds:   Current Facility-Administered Medications:     amiodarone (CORDARONE) tablet 200 mg, 200 mg, Oral, BID, Zonia Square, DO, 200 mg at 08/22/22 2818    metoprolol succinate (TOPROL XL) extended release tablet 50 mg, 50 mg, Oral, Daily, Val Almanza APRN - CNS, 50 mg at 08/22/22 1047    acetaminophen (TYLENOL) tablet 650 mg, 650 mg, Oral, Q6H PRN, Zonia Square, DO    albuterol sulfate HFA (PROVENTIL;VENTOLIN;PROAIR) 108 (90 Base) MCG/ACT inhaler 2 puff, 2 puff, Inhalation, Q6H PRN, Zonia Square, DO, 2 puff at 08/21/22 0840    [Held by provider] apixaban (ELIQUIS) tablet 5 mg, 5 mg, Oral, BID, Zonia Square, DO, 5 mg at 08/20/22 9169    [Held by provider] furosemide (LASIX) tablet 40 mg, 40 mg, Oral, Daily, Zonia Square, DO, 40 mg at 08/21/22 9499    [Held by provider] lisinopril (PRINIVIL;ZESTRIL) tablet 2.5 mg, 2.5 mg, Oral, Daily, Zonia Square, DO    nitroGLYCERIN (NITROSTAT) SL tablet 0.4 mg, 0.4 mg, SubLINGual, Q5 Min PRN, Zonia Square, DO    pantoprazole (PROTONIX) tablet 40 mg, 40 mg, Oral, QAM Melvin CARDONA, DO, 40 mg at 08/22/22 0546    rosuvastatin (CRESTOR) tablet 20 mg, 20 mg, Oral, Daily, Zonia Square, DO, 20 mg at 08/22/22 0546    tamsulosin (FLOMAX) capsule 0.4 mg, 0.4 mg, Oral, Nightly, Zonia Mansi, DO, 0.4 mg at 08/21/22 1946    sodium chloride flush 0.9 % injection 5-40 mL, 5-40 mL, IntraVENous, 2 times per day, Zonia DO Mansi, 10 mL at 08/22/22 0827    sodium chloride flush 0.9 % injection 10 mL, 10 mL, IntraVENous, PRN, [place] Bucco, DO    0.9 % sodium chloride infusion, , IntraVENous, PRN, [place] Bucco, DO    potassium chloride (KLOR-CON M) extended release tablet 40 mEq, 40 mEq, Oral, PRN **OR** potassium bicarb-citric acid (EFFER-K) effervescent tablet 40 mEq, 40 mEq, Oral, PRN **OR** potassium chloride 10 mEq/100 mL IVPB (Peripheral Line), 10 mEq, IntraVENous, PRN, Florida Bucco, DO    magnesium sulfate 1000 mg in dextrose 5% 100 mL IVPB, 1,000 mg, IntraVENous, PRN, Florida Bucco, DO    ondansetron (ZOFRAN-ODT) disintegrating tablet 4 mg, 4 mg, Oral, Q8H PRN **OR** ondansetron (ZOFRAN) injection 4 mg, 4 mg, IntraVENous, Q6H PRN, Florida Bucco, DO    polyethylene glycol (GLYCOLAX) packet 17 g, 17 g, Oral, Daily PRN, Florida Bucco, DO    acetaminophen (TYLENOL) tablet 650 mg, 650 mg, Oral, Q6H PRN **OR** acetaminophen (TYLENOL) suppository 650 mg, 650 mg, Rectal, Q6H PRN, Florida Bucco, DO    lactobacillus (CULTURELLE) capsule 1 capsule, 1 capsule, Oral, TID WC, [facility], DO, 1 capsule at 08/22/22 1141   Continuous Infusions:   sodium chloride       CBC:   No results for input(s): WBC, HGB, PLT in the last 72 hours. BMP:    Recent Labs     08/20/22  0839 08/22/22  0821    140   K 4.0 4.2    107   CO2 22 24   BUN 20 22   CREATININE 1.23* 1.32*   GLUCOSE 116* 100*       Hepatic:   No results for input(s): AST, ALT, ALB, BILITOT, ALKPHOS in the last 72 hours. Troponin:   No results for input(s): TROPHS in the last 72 hours. BNP: No results for input(s): BNP in the last 72 hours. Lipids: No results for input(s): CHOL, HDL in the last 72 hours. Invalid input(s): LDLCALCU  INR:   No results for input(s): INR in the last 72 hours.       Objective:   Vitals: /63   Pulse 68   Temp 97.5 °F (36.4 °C) (Oral)   Resp 20   Ht 5' 9\" (1.753 m)   Wt 156 lb 1.4 oz (70.8 kg)   SpO2 97%   BMI 23.05 kg/m²   General appearance: alert and cooperative with exam  HEENT: Head: Normocephalic, no lesions, without obvious abnormality. Neck: no adenopathy, no carotid bruit, no JVD, supple, symmetrical, trachea midline and thyroid not enlarged, symmetric, no tenderness/mass/nodules  Lungs: very diminished to auscultation bilaterally with fine expiratory wheezing. No rales  Heart: regular rate and rhythm, S1, S2 normal, no murmur, click, rub or gallop, SR with PVCs  Abdomen: soft, non-tender; bowel sounds normal; no masses,  no organomegaly  Extremities: extremities normal, atraumatic, no cyanosis or edema  Neurologic: Mental status: Alert, oriented, thought content appropriate    EKG:   Results for orders placed or performed during the hospital encounter of 08/18/22   EKG 12 Lead   Result Value Ref Range    Ventricular Rate 80 BPM    Atrial Rate 66 BPM    QRS Duration 174 ms    Q-T Interval 454 ms    QTc Calculation (Bazett) 523 ms    P Axis 54 degrees    R Axis -109 degrees    T Axis 20 degrees    Narrative    Ventricular-paced rhythm with occasional Premature ventricular complexes  Abnormal ECG  When compared with ECG of 18-AUG-2022 16:45,  Vent.  rate has decreased BY  12 BPM     ECHO:   Results for orders placed or performed during the hospital encounter of 07/21/22   ECHO Complete 2D W Doppler W Color   Result Value Ref Range    Left Ventricular Ejection Fraction 28     LVEF MODALITY ECHO     Narrative    [address] [place] [address]    Transthoracic Echocardiography Report (TTE)     Patient Name Loree Luna       Date of Study                 07/22/2022                Judie Og      Date of      1945  Gender                        Male   Birth      Age          68 year(s)  Race                                Room Number         Height:                       69 inch, 175.26 cm      Corporate ID [id number]    Weight:                       172 pounds, 78 kg   #      Patient Acct [id number]   BSA:           1.94 m^2       BMI:      25.4   #                                                                kg/m^2      MR #         [id number]     Sonographer                   Sveta Friedman      Accession #  [id number]  Interpreting Physician        [id number]      Fellow                   Referring Nurse Practitioner      Interpreting             Referring Physician           Nikki Wall     Type of Study      TTE procedure:2D Echocardiogram, M-Mode, Doppler, Color Doppler. Procedure Date  Date: 07/22/2022 Start: 07:18 AM    Study Location: [facility]  Technical Quality: Limited visualization due to poor acoustical window. Indications:Chest pain. History / Tech. Comments:  Procedure explained to patient. History of HDL, HTN, MI, multiple sclerosis,  former smoker, systolic heart failure, peripheral vascular disease,  pacemaker/defibrillator    Patient Status: Inpatient    Height: 69 inches Weight: 172 pounds BSA: 1.94 m^2 BMI: 25.4 kg/m^2    HR: 72 bpm BP: 105/46 mmHg    CONCLUSIONS    Summary  Technically difficult study. Left ventricle is normal in size and normal left ventricular wall thickness. Global left ventricular systolic function is severely reduced Estimated  ejection fraction is 25-30%. Right ventricle is normal in size with reduced function. Mild aortic insufficiency. Mild mitral regurgitation. Mild tricuspid regurgitation. Estimated right ventricular systolic pressure  is 08.72 mmHg. Aortic root and ascending aorta are mildly dilated measuring 3.9 cm. IVC Increased diameter, but still has inspiratory variation suggesting upper  normal or mildly elevated RA filling pressure (i.e. CVP) .     Signature  ----------------------------------------------------------------------------   Electronically signed by Zoe Carolina(Sonographer) on 07/22/2022 08:38 AM  ----------------------------------------------------------------------------    ----------------------------------------------------------------------------   Electronically signed by Abdi Santo(Interpreting physician) on 07/22/2022   02:27 PM  ----------------------------------------------------------------------------  FINDINGS  Left Atrium  Left atrium is normal in size. Inter-atrial septum is intact with no evidence for an atrial septal defect  by color doppler. Left Ventricle  Left ventricle is normal in size and normal left ventricular wall thickness. Global left ventricular systolic function is severely reduced Estimated  ejection fraction is 25-30%. Right Atrium  Right atrium appears normal in size. Pacemaker / ICD lead seen in right atrium. Right Ventricle  Right ventricle is normal in size with reduced function. Pacemaker / ICD lead seen in right ventricle. Mitral Valve  Normal mitral valve structure. Mild mitral regurgitation. No mitral stenosis. Aortic Valve  Aortic leaflet calcification. Mild aortic insufficiency. No aortic stenosis. Tricuspid Valve  The tricuspid valve was not well visualized. Mild tricuspid regurgitation. Estimated right ventricular systolic pressure is 67.36 mmHg. No tricuspid stenosis. Pulmonic Valve  Pulmonic valve not well visualized but Doppler velocities are normal.  No evidence of pulmonic stenosis. Pericardial Effusion  No significant pericardial effusion is seen. Pleural Effusion  No pleural effusion seen. Miscellaneous  Aortic root and ascending aorta are mildly dilated measuring 3.9 cm. IVC Increased diameter, but still has inspiratory variation suggesting upper  normal or mildly elevated RA filling pressure (i.e. CVP) .   E/E' average = 15.95.    M-mode / 2D Measurements & Calculations:      LVIDd:5.5 cm(3.7 - 5.6 cm)       Diastolic WKRBFA:041 ml   ZKNJQ:7.9 cm(2.2 - 4.0 cm)       Systolic MEHOWO:98.4 ml   IVSd:1.1 cm(0.6 - 1.1 cm)        Aortic Root:3.9 cm(2.0 - 3.7 cm)   LVPWd:1.1 cm(0.6 - 1.1 cm)       LA Dimension: 3.8 cm(1.9 - 4.0 cm)   Fractional Shortenin.36 %    LA volume/Index: 64.8 ml /33m^2   Calculated LVEF (%): 36.17 %     LVOT:2.4 cm                                    RVDd:4.1 cm      Mitral:                                  Aortic      Valve Area (P1/2-Time): 2.82 cm^2        Peak Velocity: 1.59 m/s   Peak E-Wave: 0.80 m/s                    Mean Velocity: 1.09 m/s   Peak A-Wave: 0.81 m/s                    Peak Gradient: 10.11 mmHg   E/A Ratio: 0.99                          Mean Gradient: 6 mmHg   Peak Gradient: 2.56 mmHg   Deceleration Time: 267 msec   P1/2t: 78 msec                           Area (continuity): 1.72 cm^2                                            AV VTI: 32.9 cm      Tricuspid:                               Pulmonic:      Peak TR Velocity: 2.39 m/s               Peak Velocity: 0.78 m/s   Peak TR Gradient: 22.8484 mmHg           Peak Gradient: 2.43 mmHg   Estimated RA Pressure: 10 mmHg                                               Estimated PASP: 32.85 mmHg     Septal Wall E' velocity:0.05 m/s  Lateral Wall E' velocity:0.06 m/s     No results found for this or any previous visit. No results found for this or any previous visit.     Results for orders placed during the hospital encounter of 22    Echocardiogram Limited 2D Adult    Narrative  [address] [place] [address]    Transthoracic Echocardiography Report (TTE)    Patient Name Milly Isaac       Date of Study               2022  Gio Cardozo    Date of      1945  Gender                      Male  Birth    Age          68 year(s)  Race                            Room Number         Height:                     69 inch, 175.26 cm    Corporate ID [id number]    Weight:                     157 pounds, 71.2 kg  #    Patient Acct [de-identified]   BSA:          1.86 m^2      BMI:     23.18 kg/m^2  #    MR #         [id number]     Sonographer thickness,  global left ventricular systolic function is severely reduced with a  calculated EF 25.4%. Severe global hypokinesis. Right Atrium  Pacemaker / ICD lead seen in right atrium. Right Ventricle  Right ventricular dilatation with reduced systolic function. Pacemaker / ICD lead seen in right ventricle. Mitral Valve  The mitral valve opens well. Aortic Valve  Not well visualized but appears to open. Tricuspid Valve  The tricuspid valve opens well. Pulmonic Valve  Pulmonic valve not well visualized. Pericardial Effusion  No pericardial effusion seen. Pleural Effusion  No pleural effusion seen. Miscellaneous  Aortic root is mildly dilated measuring 4.0cm. M-mode / 2D Measurements & Calculations:    LVIDd:5.1 cm(3.7 - 5.6 cm)       Diastolic HYNHCX:707 ml  OJYTE:6.8 cm(2.2 - 4.0 cm)       Systolic UJOKZZ:216 ml  UEYE:1.3 cm(0.6 - 1.1 cm)        Aortic Root:4 cm(2.0 - 3.7 cm)  LVPWd:1 cm(0.6 - 1.1 cm)         LA Dimension: 4.1 cm(1.9 - 4.0 cm)  Fractional Shortenin.76 %  Calculated LVEF (%): 25.35 %        Assessment / Acute Cardiac Problems:   - Wide QRS on Monitor- Maybe VT other differentials include AF with abberancy- improved on IV Amio  - Known ischemic CM s/p Biv AICD  - Parox AF / Flutter  - Elevated trop due to CKD   - EF 25%  - Known CAD s/p PCI- apparently had PCI to ISR of RCA on 2020 with [facility] per care everywhere    Plan of Treatment:   1. now on amio to 200 po bid with stable rate. AICD interrogation showed 3 weeks worth of AFib and elevated fluid levels. Also showed multiple runs of VTach in monitored zone with . Continue to hold Eliquis    2. Per Dr. China Diamond will need cardiac cath given signficant VT    3. hx of CKD? (Pt. States was told levels were \"boarderline\" at one point but he never seen a nephrologist. Elissa Garcia with all [place] physicians. Nephro consulted for cath clearance. Will start gentle IVF    4.  NPO for possible cath today if cleared by nephro, otherwise will proceed with cath when cleared. Discussed in detail with patient.     Kei Simpson, APRN - CNP

## 2022-08-22 NOTE — PLAN OF CARE
Problem: Discharge Planning  Goal: Discharge to home or other facility with appropriate resources  8/22/2022 1033 by Don Oro RN  Outcome: Progressing  8/22/2022 0118 by Charline Short RN  Outcome: Progressing     Problem: Safety - Adult  Goal: Free from fall injury  8/22/2022 1033 by Don Oro RN  Outcome: Progressing  8/22/2022 0118 by Charline Short RN  Outcome: Progressing     Problem: Skin/Tissue Integrity  Goal: Absence of new skin breakdown  Description: 1. Monitor for areas of redness and/or skin breakdown  2. Assess vascular access sites hourly  3. Every 4-6 hours minimum:  Change oxygen saturation probe site  4. Every 4-6 hours:  If on nasal continuous positive airway pressure, respiratory therapy assess nares and determine need for appliance change or resting period.   8/22/2022 1033 by Don Oro RN  Outcome: Progressing  8/22/2022 0118 by Charline Short RN  Outcome: Progressing     Problem: Respiratory - Adult  Goal: Achieves optimal ventilation and oxygenation  8/22/2022 1033 by Don Oro RN  Outcome: Progressing  8/22/2022 0118 by Charline Short RN  Outcome: Progressing     Problem: Chronic Conditions and Co-morbidities  Goal: Patient's chronic conditions and co-morbidity symptoms are monitored and maintained or improved  Outcome: Progressing

## 2022-08-23 ENCOUNTER — APPOINTMENT (OUTPATIENT)
Dept: CARDIAC CATH/INVASIVE PROCEDURES | Age: 77
DRG: 246 | End: 2022-08-23
Payer: MEDICARE

## 2022-08-23 LAB
ACTIVATED CLOTTING TIME: 275 SEC (ref 79–149)
CULTURE: NORMAL
CULTURE: NORMAL
HCT VFR BLD CALC: 32.3 % (ref 40.7–50.3)
HEMOGLOBIN: 10.2 G/DL (ref 13–17)
Lab: NORMAL
Lab: NORMAL
MCH RBC QN AUTO: 27.8 PG (ref 25.2–33.5)
MCHC RBC AUTO-ENTMCNC: 31.6 G/DL (ref 28.4–34.8)
MCV RBC AUTO: 88 FL (ref 82.6–102.9)
NRBC AUTOMATED: 0 PER 100 WBC
PARTIAL THROMBOPLASTIN TIME: 25.5 SEC (ref 20.5–30.5)
PARTIAL THROMBOPLASTIN TIME: >120 SEC (ref 20.5–30.5)
PDW BLD-RTO: 15.1 % (ref 11.8–14.4)
PLATELET # BLD: 293 K/UL (ref 138–453)
PMV BLD AUTO: 10.6 FL (ref 8.1–13.5)
RBC # BLD: 3.67 M/UL (ref 4.21–5.77)
SPECIMEN DESCRIPTION: NORMAL
SPECIMEN DESCRIPTION: NORMAL
WBC # BLD: 8.2 K/UL (ref 3.5–11.3)

## 2022-08-23 PROCEDURE — 2700000000 HC OXYGEN THERAPY PER DAY

## 2022-08-23 PROCEDURE — 36415 COLL VENOUS BLD VENIPUNCTURE: CPT

## 2022-08-23 PROCEDURE — 6360000002 HC RX W HCPCS

## 2022-08-23 PROCEDURE — 027035Z DILATION OF CORONARY ARTERY, ONE ARTERY WITH TWO DRUG-ELUTING INTRALUMINAL DEVICES, PERCUTANEOUS APPROACH: ICD-10-PCS | Performed by: INTERNAL MEDICINE

## 2022-08-23 PROCEDURE — 85730 THROMBOPLASTIN TIME PARTIAL: CPT

## 2022-08-23 PROCEDURE — C1874 STENT, COATED/COV W/DEL SYS: HCPCS

## 2022-08-23 PROCEDURE — B2111ZZ FLUOROSCOPY OF MULTIPLE CORONARY ARTERIES USING LOW OSMOLAR CONTRAST: ICD-10-PCS | Performed by: INTERNAL MEDICINE

## 2022-08-23 PROCEDURE — 85027 COMPLETE CBC AUTOMATED: CPT

## 2022-08-23 PROCEDURE — 6360000004 HC RX CONTRAST MEDICATION

## 2022-08-23 PROCEDURE — 6370000000 HC RX 637 (ALT 250 FOR IP): Performed by: STUDENT IN AN ORGANIZED HEALTH CARE EDUCATION/TRAINING PROGRAM

## 2022-08-23 PROCEDURE — C9607 PERC D-E COR REVASC CHRO SIN: HCPCS

## 2022-08-23 PROCEDURE — 6370000000 HC RX 637 (ALT 250 FOR IP): Performed by: HOSPITALIST

## 2022-08-23 PROCEDURE — 6370000000 HC RX 637 (ALT 250 FOR IP): Performed by: CLINICAL NURSE SPECIALIST

## 2022-08-23 PROCEDURE — C1894 INTRO/SHEATH, NON-LASER: HCPCS

## 2022-08-23 PROCEDURE — B2151ZZ FLUOROSCOPY OF LEFT HEART USING LOW OSMOLAR CONTRAST: ICD-10-PCS | Performed by: INTERNAL MEDICINE

## 2022-08-23 PROCEDURE — 99232 SBSQ HOSP IP/OBS MODERATE 35: CPT | Performed by: INTERNAL MEDICINE

## 2022-08-23 PROCEDURE — 1200000000 HC SEMI PRIVATE

## 2022-08-23 PROCEDURE — 6370000000 HC RX 637 (ALT 250 FOR IP): Performed by: PHYSICIAN ASSISTANT

## 2022-08-23 PROCEDURE — 6370000000 HC RX 637 (ALT 250 FOR IP): Performed by: INTERNAL MEDICINE

## 2022-08-23 PROCEDURE — C1725 CATH, TRANSLUMIN NON-LASER: HCPCS

## 2022-08-23 PROCEDURE — 2709999900 HC NON-CHARGEABLE SUPPLY

## 2022-08-23 PROCEDURE — 93458 L HRT ARTERY/VENTRICLE ANGIO: CPT

## 2022-08-23 PROCEDURE — C1769 GUIDE WIRE: HCPCS

## 2022-08-23 PROCEDURE — 2580000003 HC RX 258: Performed by: STUDENT IN AN ORGANIZED HEALTH CARE EDUCATION/TRAINING PROGRAM

## 2022-08-23 PROCEDURE — 4A023N7 MEASUREMENT OF CARDIAC SAMPLING AND PRESSURE, LEFT HEART, PERCUTANEOUS APPROACH: ICD-10-PCS | Performed by: INTERNAL MEDICINE

## 2022-08-23 PROCEDURE — 99232 SBSQ HOSP IP/OBS MODERATE 35: CPT | Performed by: PHYSICIAN ASSISTANT

## 2022-08-23 PROCEDURE — C1887 CATHETER, GUIDING: HCPCS

## 2022-08-23 PROCEDURE — 6370000000 HC RX 637 (ALT 250 FOR IP)

## 2022-08-23 PROCEDURE — 85347 COAGULATION TIME ACTIVATED: CPT

## 2022-08-23 RX ORDER — HEPARIN SODIUM 1000 [USP'U]/ML
4000 INJECTION, SOLUTION INTRAVENOUS; SUBCUTANEOUS PRN
Status: DISCONTINUED | OUTPATIENT
Start: 2022-08-23 | End: 2022-08-23

## 2022-08-23 RX ORDER — SODIUM CHLORIDE 0.9 % (FLUSH) 0.9 %
5-40 SYRINGE (ML) INJECTION EVERY 12 HOURS SCHEDULED
Status: DISCONTINUED | OUTPATIENT
Start: 2022-08-23 | End: 2022-08-24 | Stop reason: HOSPADM

## 2022-08-23 RX ORDER — SODIUM CHLORIDE 0.9 % (FLUSH) 0.9 %
5-40 SYRINGE (ML) INJECTION PRN
Status: DISCONTINUED | OUTPATIENT
Start: 2022-08-23 | End: 2022-08-24 | Stop reason: HOSPADM

## 2022-08-23 RX ORDER — HEPARIN SODIUM 1000 [USP'U]/ML
4000 INJECTION, SOLUTION INTRAVENOUS; SUBCUTANEOUS ONCE
Status: DISCONTINUED | OUTPATIENT
Start: 2022-08-23 | End: 2022-08-24

## 2022-08-23 RX ORDER — ASPIRIN 81 MG/1
81 TABLET, CHEWABLE ORAL DAILY
Status: DISCONTINUED | OUTPATIENT
Start: 2022-08-23 | End: 2022-08-23

## 2022-08-23 RX ORDER — ACETAMINOPHEN 325 MG/1
650 TABLET ORAL EVERY 4 HOURS PRN
Status: DISCONTINUED | OUTPATIENT
Start: 2022-08-23 | End: 2022-08-24 | Stop reason: HOSPADM

## 2022-08-23 RX ORDER — TORSEMIDE 20 MG/1
20 TABLET ORAL DAILY
Status: DISCONTINUED | OUTPATIENT
Start: 2022-08-23 | End: 2022-08-24 | Stop reason: HOSPADM

## 2022-08-23 RX ORDER — SODIUM CHLORIDE 9 MG/ML
INJECTION, SOLUTION INTRAVENOUS PRN
Status: DISCONTINUED | OUTPATIENT
Start: 2022-08-23 | End: 2022-08-24 | Stop reason: HOSPADM

## 2022-08-23 RX ORDER — HEPARIN SODIUM AND DEXTROSE 10000; 5 [USP'U]/100ML; G/100ML
5-30 INJECTION INTRAVENOUS CONTINUOUS
Status: DISCONTINUED | OUTPATIENT
Start: 2022-08-23 | End: 2022-08-23

## 2022-08-23 RX ORDER — HEPARIN SODIUM 1000 [USP'U]/ML
2000 INJECTION, SOLUTION INTRAVENOUS; SUBCUTANEOUS PRN
Status: DISCONTINUED | OUTPATIENT
Start: 2022-08-23 | End: 2022-08-23

## 2022-08-23 RX ORDER — CLOPIDOGREL BISULFATE 75 MG/1
75 TABLET ORAL DAILY
Status: DISCONTINUED | OUTPATIENT
Start: 2022-08-23 | End: 2022-08-24 | Stop reason: HOSPADM

## 2022-08-23 RX ADMIN — Medication 1 CAPSULE: at 09:26

## 2022-08-23 RX ADMIN — SODIUM CHLORIDE, PRESERVATIVE FREE 10 ML: 5 INJECTION INTRAVENOUS at 21:40

## 2022-08-23 RX ADMIN — ACETAMINOPHEN 650 MG: 325 TABLET ORAL at 15:51

## 2022-08-23 RX ADMIN — AMIODARONE HYDROCHLORIDE 200 MG: 200 TABLET ORAL at 09:27

## 2022-08-23 RX ADMIN — SODIUM CHLORIDE, PRESERVATIVE FREE 10 ML: 5 INJECTION INTRAVENOUS at 21:35

## 2022-08-23 RX ADMIN — TORSEMIDE 20 MG: 20 TABLET ORAL at 15:51

## 2022-08-23 RX ADMIN — ROSUVASTATIN CALCIUM 20 MG: 20 TABLET, FILM COATED ORAL at 07:23

## 2022-08-23 RX ADMIN — TAMSULOSIN HYDROCHLORIDE 0.4 MG: 0.4 CAPSULE ORAL at 21:35

## 2022-08-23 RX ADMIN — Medication 81 MG: at 09:27

## 2022-08-23 RX ADMIN — AMIODARONE HYDROCHLORIDE 200 MG: 200 TABLET ORAL at 21:35

## 2022-08-23 RX ADMIN — METOPROLOL SUCCINATE 50 MG: 50 TABLET, FILM COATED, EXTENDED RELEASE ORAL at 09:27

## 2022-08-23 RX ADMIN — CLOPIDOGREL 75 MG: 75 TABLET, FILM COATED ORAL at 14:08

## 2022-08-23 RX ADMIN — Medication 1 CAPSULE: at 14:08

## 2022-08-23 RX ADMIN — PANTOPRAZOLE SODIUM 40 MG: 40 TABLET, DELAYED RELEASE ORAL at 07:23

## 2022-08-23 NOTE — PROGRESS NOTES
[facility]   Progress Note                   Date:   8/23/2022  Patient name: Ti Hawkins  Date of admission:  8/18/2022 11:58 AM  MRN:   [id number]  YOB: 1945  PCP: Janak Barone MD    Reason for Admission:     Subjective:       Clinical Changes / Abnormalities:  Seen and examined alone in room after discussion with RN. Denies any CP or SOB. Tele remains SR with PVCs. Currently NPO for cardiac cath today.   Cleared by nephrology       Medications:   Scheduled Meds:   Current Facility-Administered Medications:     heparin (porcine) injection 4,000 Units, 4,000 Units, IntraVENous, Once, Rocky Redding, APRN - NP    heparin (porcine) injection 4,000 Units, 4,000 Units, IntraVENous, PRN, Rocky Redding, APRN - NP    heparin (porcine) injection 2,000 Units, 2,000 Units, IntraVENous, PRN, Rocky Redding, APRN - NP    heparin 25,000 units in dextrose 5 % 250 mL infusion (rate based), 5-30 Units/kg/hr, IntraVENous, Continuous, Rocky Redding APRN - NP    0.9 % sodium chloride infusion, , IntraVENous, Continuous, Rachel Hernandez APRN - CNP, Last Rate: 75 mL/hr at 08/22/22 2319, New Bag at 08/22/22 2319    aspirin EC tablet 81 mg, 81 mg, Oral, Daily, Malinda Haney PA-C, 81 mg at 08/23/22 9676    amiodarone (CORDARONE) tablet 200 mg, 200 mg, Oral, BID, Darvin Hartman DO, 200 mg at 08/23/22 8543    metoprolol succinate (TOPROL XL) extended release tablet 50 mg, 50 mg, Oral, Daily, Ann Marie Isabella, APRN - CNS, 50 mg at 08/23/22 9128    acetaminophen (TYLENOL) tablet 650 mg, 650 mg, Oral, Q6H PRN, Darvin Hartman DO    albuterol sulfate HFA (PROVENTIL;VENTOLIN;PROAIR) 108 (90 Base) MCG/ACT inhaler 2 puff, 2 puff, Inhalation, Q6H PRN, Darvin Hartman DO, 2 puff at 08/21/22 0840    [Held by provider] apixaban (ELIQUIS) tablet 5 mg, 5 mg, Oral, BID, Darvin Hartman DO, 5 mg at 08/20/22 0166    [Held by provider] furosemide (LASIX) tablet 40 mg, 40 mg, Oral, Daily, Renu Mule, DO, 40 mg at 08/21/22 6161    [Held by provider] lisinopril (PRINIVIL;ZESTRIL) tablet 2.5 mg, 2.5 mg, Oral, Daily, Renu Mule, DO    nitroGLYCERIN (NITROSTAT) SL tablet 0.4 mg, 0.4 mg, SubLINGual, Q5 Min PRN, Renu Mule, DO    pantoprazole (PROTONIX) tablet 40 mg, 40 mg, Oral, QAM AC, Renu Mule, DO, 40 mg at 08/23/22 6416    rosuvastatin (CRESTOR) tablet 20 mg, 20 mg, Oral, Daily, Renu Mule, DO, 20 mg at 08/23/22 3766    tamsulosin (FLOMAX) capsule 0.4 mg, 0.4 mg, Oral, Nightly, Renu Mule, DO, 0.4 mg at 08/22/22 2034    sodium chloride flush 0.9 % injection 5-40 mL, 5-40 mL, IntraVENous, 2 times per day, Renu Mule, DO, 10 mL at 08/22/22 0827    sodium chloride flush 0.9 % injection 10 mL, 10 mL, IntraVENous, PRN, Renu Mule, DO    0.9 % sodium chloride infusion, , IntraVENous, PRN, Renu Mule, DO    potassium chloride (KLOR-CON M) extended release tablet 40 mEq, 40 mEq, Oral, PRN **OR** potassium bicarb-citric acid (EFFER-K) effervescent tablet 40 mEq, 40 mEq, Oral, PRN **OR** potassium chloride 10 mEq/100 mL IVPB (Peripheral Line), 10 mEq, IntraVENous, PRN, Renu Mule, DO    magnesium sulfate 1000 mg in dextrose 5% 100 mL IVPB, 1,000 mg, IntraVENous, PRN, Renu Mule, DO    ondansetron (ZOFRAN-ODT) disintegrating tablet 4 mg, 4 mg, Oral, Q8H PRN **OR** ondansetron (ZOFRAN) injection 4 mg, 4 mg, IntraVENous, Q6H PRN, Renu Mule, DO    polyethylene glycol (GLYCOLAX) packet 17 g, 17 g, Oral, Daily PRN, Renu Mule, DO    acetaminophen (TYLENOL) tablet 650 mg, 650 mg, Oral, Q6H PRN **OR** acetaminophen (TYLENOL) suppository 650 mg, 650 mg, Rectal, Q6H PRN, Renu Mule, DO    lactobacillus (CULTURELLE) capsule 1 capsule, 1 capsule, Oral, TID WC, Renu Mule, DO, 1 capsule at 08/23/22 0926   Continuous Infusions:   heparin (PORCINE) Infusion      sodium chloride 75 mL/hr at 08/22/22 2319    sodium chloride       CBC:   Recent Labs     08/23/22  1000   WBC 8.2   HGB 10.2*          BMP:    Recent Labs     08/22/22  0821      K 4.2      CO2 24   BUN 22   CREATININE 1.32*   GLUCOSE 100*       Hepatic:   No results for input(s): AST, ALT, ALB, BILITOT, ALKPHOS in the last 72 hours. Troponin:   No results for input(s): TROPHS in the last 72 hours. BNP: No results for input(s): BNP in the last 72 hours. Lipids: No results for input(s): CHOL, HDL in the last 72 hours. Invalid input(s): LDLCALCU  INR:   No results for input(s): INR in the last 72 hours. Objective:   Vitals: /69   Pulse 69   Temp 97.7 °F (36.5 °C) (Oral)   Resp 17   Ht 5' 9\" (1.753 m)   Wt 155 lb 3.3 oz (70.4 kg)   SpO2 97%   BMI 22.92 kg/m²   General appearance: alert and cooperative with exam  HEENT: Head: Normocephalic, no lesions, without obvious abnormality. Neck: no adenopathy, no carotid bruit, no JVD, supple, symmetrical, trachea midline and thyroid not enlarged, symmetric, no tenderness/mass/nodules  Lungs: very diminished to auscultation bilaterally with fine expiratory wheezing. No rales  Heart: regular rate and rhythm, S1, S2 normal, no murmur, click, rub or gallop, SR with PVCs  Abdomen: soft, non-tender; bowel sounds normal; no masses,  no organomegaly  Extremities: extremities normal, atraumatic, no cyanosis or edema  Neurologic: Mental status: Alert, oriented, thought content appropriate    EKG:   Results for orders placed or performed during the hospital encounter of 08/18/22   EKG 12 Lead   Result Value Ref Range    Ventricular Rate 80 BPM    Atrial Rate 66 BPM    QRS Duration 174 ms    Q-T Interval 454 ms    QTc Calculation (Bazett) 523 ms    P Axis 54 degrees    R Axis -109 degrees    T Axis 20 degrees    Narrative    Ventricular-paced rhythm with occasional Premature ventricular complexes  Abnormal ECG  When compared with ECG of 18-AUG-2022 16:45,  Vent.  rate has decreased BY  12 BPM     ECHO:   Results for orders placed or performed during the hospital encounter of 07/21/22   ECHO Complete 2D W Doppler W Color   Result Value Ref Range    Left Ventricular Ejection Fraction 28     LVEF MODALITY ECHO     Narrative    [address] [place] [address]    Transthoracic Echocardiography Report (TTE)     Patient Name Milly Isaac       Date of Study                 07/22/2022                Gio Cardozo      Date of      1945  Gender                        Male   Birth      Age          68 year(s)  Race                                Room Number         Height:                       69 inch, 175.26 cm      Corporate ID [id number]    Weight:                       172 pounds, 78 kg   #      Patient Acct [de-identified]   BSA:           1.94 m^2       BMI:      25.4   #                                                                kg/m^2      MR #         [id number]     Sonographer                   Reyes Savannah      Accession #  [id number]  Interpreting Physician        [address] [place] [address]      Fellow                   Referring Nurse Practitioner      Interpreting             Referring Physician           Nikki Chapman     Type of Study      TTE procedure:2D Echocardiogram, M-Mode, Doppler, Color Doppler. Procedure Date  Date: 07/22/2022 Start: 07:18 AM    Study Location: [facility]  Technical Quality: Limited visualization due to poor acoustical window. Indications:Chest pain. History / Tech. Comments:  Procedure explained to patient. History of HDL, HTN, MI, multiple sclerosis,  former smoker, systolic heart failure, peripheral vascular disease,  pacemaker/defibrillator    Patient Status: Inpatient    Height: 69 inches Weight: 172 pounds BSA: 1.94 m^2 BMI: 25.4 kg/m^2    HR: 72 bpm BP: 105/46 mmHg    CONCLUSIONS    Summary  Technically difficult study. Left ventricle is normal in size and normal left ventricular wall thickness.   Global left ventricular systolic function is severely reduced Estimated  ejection fraction is 25-30%. Right ventricle is normal in size with reduced function. Mild aortic insufficiency. Mild mitral regurgitation. Mild tricuspid regurgitation. Estimated right ventricular systolic pressure  is 36.33 mmHg. Aortic root and ascending aorta are mildly dilated measuring 3.9 cm. IVC Increased diameter, but still has inspiratory variation suggesting upper  normal or mildly elevated RA filling pressure (i.e. CVP) . Signature  ----------------------------------------------------------------------------   Electronically signed by Zoe Duggan(Sonographer) on 07/22/2022 08:38 AM  ----------------------------------------------------------------------------    ----------------------------------------------------------------------------   Electronically signed by Abdi Santo(Interpreting physician) on 07/22/2022   02:27 PM  ----------------------------------------------------------------------------  FINDINGS  Left Atrium  Left atrium is normal in size. Inter-atrial septum is intact with no evidence for an atrial septal defect  by color doppler. Left Ventricle  Left ventricle is normal in size and normal left ventricular wall thickness. Global left ventricular systolic function is severely reduced Estimated  ejection fraction is 25-30%. Right Atrium  Right atrium appears normal in size. Pacemaker / ICD lead seen in right atrium. Right Ventricle  Right ventricle is normal in size with reduced function. Pacemaker / ICD lead seen in right ventricle. Mitral Valve  Normal mitral valve structure. Mild mitral regurgitation. No mitral stenosis. Aortic Valve  Aortic leaflet calcification. Mild aortic insufficiency. No aortic stenosis. Tricuspid Valve  The tricuspid valve was not well visualized. Mild tricuspid regurgitation. Estimated right ventricular systolic pressure is 19.54 mmHg. No tricuspid stenosis.   Pulmonic Valve  Pulmonic valve not well visualized but Doppler velocities are normal.  No evidence of pulmonic stenosis. Pericardial Effusion  No significant pericardial effusion is seen. Pleural Effusion  No pleural effusion seen. Miscellaneous  Aortic root and ascending aorta are mildly dilated measuring 3.9 cm. IVC Increased diameter, but still has inspiratory variation suggesting upper  normal or mildly elevated RA filling pressure (i.e. CVP) . E/E' average = 15.95.    M-mode / 2D Measurements & Calculations:      LVIDd:5.5 cm(3.7 - 5.6 cm)       Diastolic UKSZDI:099 ml   KNSAV:3.4 cm(2.2 - 4.0 cm)       Systolic IJXUHD:39.1 ml   IVSd:1.1 cm(0.6 - 1.1 cm)        Aortic Root:3.9 cm(2.0 - 3.7 cm)   LVPWd:1.1 cm(0.6 - 1.1 cm)       LA Dimension: 3.8 cm(1.9 - 4.0 cm)   Fractional Shortenin.36 %    LA volume/Index: 64.8 ml /33m^2   Calculated LVEF (%): 36.17 %     LVOT:2.4 cm                                    RVDd:4.1 cm      Mitral:                                  Aortic      Valve Area (P1/2-Time): 2.82 cm^2        Peak Velocity: 1.59 m/s   Peak E-Wave: 0.80 m/s                    Mean Velocity: 1.09 m/s   Peak A-Wave: 0.81 m/s                    Peak Gradient: 10.11 mmHg   E/A Ratio: 0.99                          Mean Gradient: 6 mmHg   Peak Gradient: 2.56 mmHg   Deceleration Time: 267 msec   P1/2t: 78 msec                           Area (continuity): 1.72 cm^2                                            AV VTI: 32.9 cm      Tricuspid:                               Pulmonic:      Peak TR Velocity: 2.39 m/s               Peak Velocity: 0.78 m/s   Peak TR Gradient: 22.8484 mmHg           Peak Gradient: 2.43 mmHg   Estimated RA Pressure: 10 mmHg                                               Estimated PASP: 32.85 mmHg     Septal Wall E' velocity:0.05 m/s  Lateral Wall E' velocity:0.06 m/s     No results found for this or any previous visit. No results found for this or any previous visit.     Results for orders placed during the hospital encounter of 08/18/22    Echocardiogram Limited 2D Adult    Narrative  [address] [place] [address]    Transthoracic Echocardiography Report (TTE)    Patient Name Estrellita Cm       Date of Study               08/19/2022  Grace Duong    Date of      1945  Gender                      Male  Birth    Age          68 year(s)  Race                            Room Number         Height:                     69 inch, 175.26 cm    Corporate ID [id number]    Weight:                     157 pounds, 71.2 kg  #    Patient Acct [de-identified]   BSA:          1.86 m^2      BMI:     23.18 kg/m^2  #    MR #         [id number]     Sonographer                 Ayush Quiñones    Accession #  [id number]  Interpreting Physician      [address] [facility]    Fellow                   Referring Nurse  Practitioner    Interpreting             Referring Physician         Yolande Mueller  Fellow    Type of Study    TTE procedure:2D Echocardiogram, Limited Echo. Procedure Date  Date: 08/19/2022 Start: 10:24 AM    Study Location: [facility]  Technical Quality: Fair visualization    Indications:Pericardial effusion and Ventricular tachycardia. History / Tech. Comments:  Procedure explained to patient. History of afib, CAD, RCA stent, HDL, MI,  HTN, multiple sclerosis, former smoker, systolic heart failure, peripheral  vascular disease, pacemaker/defibrillator, Vtach    Patient Status: Inpatient    Height: 69 inches Weight: 157 pounds BSA: 1.86 m^2 BMI: 23.18 kg/m^2    Rhythm: Within normal limits HR: 72 bpm    CONCLUSIONS    Summary  A limited echocardiogram was perform to assess pericardial effusion. Left ventricle is normal in size, normal left ventricular wall thickness,  global left ventricular systolic function is severely reduced with a  calculated EF 25.4%. Severe global hypokinesis. Right ventricular dilatation with reduced systolic function.  Pacemaker / ICD  lead seen in right ventricle and right atrium. No pericardial effusion seen. Aortic root is mildly dilated measuring 4.0cm. Signature  ----------------------------------------------------------------------------  Electronically signed by Zoe Carolina(Sonographer) on 2022 11:24 AM  ----------------------------------------------------------------------------    ----------------------------------------------------------------------------  Electronically signed by Abdi Santo(Interpreting physician) on 2022  12:57 PM  ----------------------------------------------------------------------------  FINDINGS    Left Ventricle  Left ventricle is normal in size, normal left ventricular wall thickness,  global left ventricular systolic function is severely reduced with a  calculated EF 25.4%. Severe global hypokinesis. Right Atrium  Pacemaker / ICD lead seen in right atrium. Right Ventricle  Right ventricular dilatation with reduced systolic function. Pacemaker / ICD lead seen in right ventricle. Mitral Valve  The mitral valve opens well. Aortic Valve  Not well visualized but appears to open. Tricuspid Valve  The tricuspid valve opens well. Pulmonic Valve  Pulmonic valve not well visualized. Pericardial Effusion  No pericardial effusion seen. Pleural Effusion  No pleural effusion seen. Miscellaneous  Aortic root is mildly dilated measuring 4.0cm.     M-mode / 2D Measurements & Calculations:    LVIDd:5.1 cm(3.7 - 5.6 cm)       Diastolic QIDPHE:084 ml  CUHRA:0.4 cm(2.2 - 4.0 cm)       Systolic PODNGQ:426 ml  LUMX:9.6 cm(0.6 - 1.1 cm)        Aortic Root:4 cm(2.0 - 3.7 cm)  LVPWd:1 cm(0.6 - 1.1 cm)         LA Dimension: 4.1 cm(1.9 - 4.0 cm)  Fractional Shortenin.76 %  Calculated LVEF (%): 25.35 %        Assessment / Acute Cardiac Problems:   - Wide QRS on Monitor- Maybe VT other differentials include AF with abberancy- improved on IV Amio  - Known ischemic CM s/p Biv AICD  - Parox AF / Flutter  - Elevated trop due to CKD   - EF 25%  - Known CAD s/p PCI- apparently had PCI to ISR of RCA on 7/2020 with [facility] per care everywhere    Plan of Treatment:   1. now on amio to 200 po bid with stable rate. AICD interrogation showed 3 weeks worth of AFib and elevated fluid levels. Also showed multiple runs of VTach in monitored zone with . Continue to hold Eliquis    2. Per Dr. Janes Jimenez will need cardiac cath given signficant VT    3. hx of CKD? (Pt. States was told levels were \"boarderline\" at one point but he never seen a nephrologist. Heraclio Moody with all [address] [facility] physicians. Continue gentle IVF    4.  Cleared by Nephro for cardiac cath     Mode Waters, APRN - CNP

## 2022-08-23 NOTE — FLOWSHEET NOTE
Patient washed up at bedside, provided toothpaste and mouthwash, wash basin with warm soapy water, towels and wash cloths.

## 2022-08-23 NOTE — PROGRESS NOTES
Physician Progress Note      PATIENT:               Lisette Reyes  CSN #:                  [id number]  :                       1945  ADMIT DATE:       2022 11:58 AM  DISCH DATE:  Jose Maria Vogel  PROVIDER #:        Jeffrey MARVIN          QUERY TEXT:    Patient admitted with Weakness and v tach s/p cardiac Cath which showed   significant vessel disease requiring NICK . on admission troponin 61>51 and EKG   showed paced rhythm with PVC   Noted documentation of NSTEMI per renal   consult note on  and elevated troponins due to ckd per cardiology progress   note on . If possible please clarify one of the following : The medical record reflects the following:  Risk Factors: age, ckd, cmp, a fib  Clinical Indicators:  admitted with Weakness and v tach s/p cardiac Cath which   showed significant vessel disease requiring NICK . on admission troponin 61>51   and EKG showed paced rhythm with PVC   Noted documentation of NSTEMI per   renal consult note on  and elevated troponins due to ckd per cardiology   progress note on   Treatment: heart cath, lab monitoring, EKG,    Thank You Demetrius Grewal RN BSN  CCDS  Email [email or web address]  Cell [phone]  office hours M-F 6am to 2:30pm  Options provided:  -- elevated troponins due to NSTEMI confirmed  -- Elevated troponins due to CKD, NSTEMI ruled out  -- elevated troponins due to unstable angina with CAD  -- Other - I will add my own diagnosis  -- Disagree - Not applicable / Not valid  -- Disagree - Clinically unable to determine / Unknown  -- Refer to Clinical Documentation Reviewer    PROVIDER RESPONSE TEXT:    elevated troponins due to NSTEMI which has been confirmed    Query created by:  Roxana Childers on 2022 2:29 PM      Electronically signed by:  Jeffrey MARVIN 2022 4:46 PM

## 2022-08-23 NOTE — OP NOTE
[facility]    CARDIAC CATHETERIZATION    Date:   8/23/2022  Patient name:  Andrae Prado  Date of admission:  8/18/2022 11:58 AM  MRN:   [id number]  YOB: 1945    Operators:  Primary:   Bhakti Glasgow MD (Attending Physician)    Assistant/CV fellow:  Estrellita Lopez MD      Procedure performed:     [x] Left Heart Catheterization. [] Graft Angiography. [x] Left Ventriculography. [] Right Heart Catheterization. [x] Coronary Angiography. [] Aortic Valve Studies. [] PCI:      [] Other:       Pre Procedure Conscious Sedation Data:  ASA Class:    [] I [] II [x] III [] IV    Mallampati Class:  [x] I [] II [] III [] IV      Indication:  - VT     Procedure:  Access:  [] Femoral  [x] Radial  artery       [x] Right  [] Left    Procedure: After informed consent was obtained with explanation of the risks and benefits, patient was brought to the cath lab. The access area was prepped and draped in sterile fashion. 1% lidocaine was used for local block. The artery was cannulated with 6  Fr sheath with brisk arterial blood return. The side port was frequently flushed and aspirated with normal saline. Findings:  LMCA: Normal 0% stenosis. LAD: Mild irregularities 20-30%. LCx: Mild irregularities 10-20%. RCA: Dominant  mid 100% stenosis  Lesion on Mid RCA: Mid subsection. 100% stenosis 20 mm length reduced to 10%. Pre procedure  DANE 0 flow was noted. Post Procedure DANE III flow was present. Poor runoff was present. The lesion  was diagnosed as High Risk (C). Devices used  l Cross It Wire 190cm. Number of passes: 1.  l Euphora Balloon 1.5mm x 12mm. 8 inflation(s) to a max pressure of: 14 jewels.  l Euphora Balloon 2.5mm x 30mm. 1 inflation(s) to a max pressure of: 19 jewels. l 6 fr GuideLiner. Number of passes: 1.  l Resolute Vinay 4.0 x 38 NICK. 2 inflation(s) to a max pressure of: 24 jewels.  l NC Euphora Balloon 4.5mm x 12mm. 7 inflation(s) to a max pressure of: 29 jewels.   Lesion on Dist RCA: Mid subsection. 100% stenosis 12 mm length reduced to 20%. Pre procedure  DANE 0 flow was noted. Post Procedure DANE III flow was present. Poor runoff was present. The lesion  was diagnosed as High Risk (C). Devices used  l Mini Trek Balloon 1.20mm x 12mm. 3 inflation(s) to a max pressure of: 14 jewels.  l Euphora Balloon 2.5mm x 30mm. 3 inflation(s) to a max pressure of: 12 jewels.  l Luge Wire 182 cm. Number of passes: 1.  l Resolute [place] 3.0 x 26 NICK. 1 inflation(s) to a max pressure of: 12 jewels. Procedure Summary  Single significant vessel disease 100% in previous mid stent  Successful PTCA -NICK of the  mid and distal RCA  Lower normal LV function  Recommendations  Post stent protocol    Estimated Blood Loss: 10  mL        ____________________________________________________________________    Electronically signed on 8/23/2022 at 2:09 PM by:    Kimi Marquez MD  Fellow, [address]     I have reviewed the case / procedure with resident / fellow  I have examined the patient personally  Patient agree with treatment plan as discussed before, final arrangement based on my evaluation and exam.    Risk and benefit of procedure planned were explained in details. Procedure was performed by me personally, with all aspect of the procedure being done using standard protocol. Note was modified based on my own assessment and treatment.     [facility]

## 2022-08-23 NOTE — PLAN OF CARE
Problem: Discharge Planning  Goal: Discharge to home or other facility with appropriate resources  8/22/2022 1042 by Libia Calvin RN  Outcome: Progressing  8/22/2022 1033 by Libia Calvin RN  Outcome: Progressing     Problem: Safety - Adult  Goal: Free from fall injury  8/22/2022 2346 by Carlos Nicholas RN  Outcome: Progressing  8/22/2022 1042 by Libia Calvin RN  Outcome: Progressing  8/22/2022 1033 by Libia Calvin RN  Outcome: Progressing     Problem: Skin/Tissue Integrity  Goal: Absence of new skin breakdown  Description: 1. Monitor for areas of redness and/or skin breakdown  2. Assess vascular access sites hourly  3. Every 4-6 hours minimum:  Change oxygen saturation probe site  4. Every 4-6 hours:  If on nasal continuous positive airway pressure, respiratory therapy assess nares and determine need for appliance change or resting period.   8/22/2022 2346 by Carlos Nicholas RN  Outcome: Progressing  8/22/2022 1042 by Libia Calvin RN  Outcome: Progressing  8/22/2022 1033 by Libia Calvin RN  Outcome: Progressing     Problem: Respiratory - Adult  Goal: Achieves optimal ventilation and oxygenation  8/22/2022 1042 by Libia Calvin RN  Outcome: Progressing  8/22/2022 1033 by Libia Calvin RN  Outcome: Progressing     Problem: Chronic Conditions and Co-morbidities  Goal: Patient's chronic conditions and co-morbidity symptoms are monitored and maintained or improved  8/22/2022 2346 by Carlos Nicholas RN  Outcome: Progressing  8/22/2022 1042 by Libia Calvin RN  Outcome: Progressing  8/22/2022 1033 by Libia Calvin RN  Outcome: Progressing

## 2022-08-23 NOTE — PROGRESS NOTES
[facility]  Office: [address], DO, Fiona Ferguson, DO, Fantasma Mail, DO, Huong Has Blood, DO, Remy Montoya MD, Rowan Mina MD, Jonathan Arredondo MD, Rea Mcmullen MD,  Kesha Kimble MD, Nelia Huffman MD, Orlin Hernandez, DO, Leonel Samayoa MD,  Rajesh Machado MD, Juan Stewart MD, Concha Garcia, DO, Amber Cain MD, Cesia Mayo MD, Feliberto Sanches MD, Wisam Benitez, DO, Conor Henry MD, Douglas Leija MD, Gely Mom, CNP,  Soto Raw, CNP, Tracy Guerrero, CNP, Nyla Booty, CNP, Elaine Glover PA-C, Ewelina Guzman, DNP, Wero Tubbs, CNP, Janee Beat, CNP, Mode Class, CNP, Akil Croft, CNP, Ameena Simental, CNP, Lisandro Fischer, CNS, Silvina Mendoza, [facility], Kaila Roy, CNP, Astrid Parr, CNP, Sin Malone, CNP           [address]    Progress Note    8/23/2022    9:50 AM    Name:   Kely Barron  MRN:     [id number]     [place]:      [de-identified]   Room:   2008/2008-01  IP Day:  4  Admit Date:  8/18/2022 11:58 AM    PCP:   Bobby Gomez MD  Code Status:  Full Code    Subjective:     C/C:   Chief Complaint   Patient presents with    Fatigue     X3-4 days since the diarrhea- pt has been home for  days-prior to that he [place] [facility] [place]    Diarrhea     X3 days - denies any N/V     Interval History Status: unchanged. Pt seen and evaluated this morning. Awaiting cardiac cath. No new complaints. Denies chest pain. Brief History: This is a 79-year-old male with past medical history significant for multiple sclerosis, ischemic cardiomyopathy status post AICD placement, history of coronary artery disease status post PCI. Initially presented to outside hospital for weakness and fatigue. His AICD was interrogated and revealing ventricular tachycardia prompting his transfer to [facility].   He has been evaluated by cardiology who is recommending cardiac catheterization. Review of Systems:     Constitutional:  negative for chills, fevers, sweats  Respiratory:  negative for cough, dyspnea on exertion, shortness of breath, wheezing  Cardiovascular:  negative for chest pain, chest pressure/discomfort, lower extremity edema, palpitations  Gastrointestinal:  negative for abdominal pain, constipation, diarrhea, nausea, vomiting  Neurological:  negative for dizziness, headache    Medications: Allergies:     Allergies   Allergen Reactions    Codeine     Doxycycline     Erythromycin     Gammagard [Immune Globulin]     Sulfa Antibiotics Hives       Current Meds:   Scheduled Meds:    heparin (porcine)  4,000 Units IntraVENous Once    aspirin  81 mg Oral Daily    amiodarone  200 mg Oral BID    metoprolol succinate  50 mg Oral Daily    [Held by provider] apixaban  5 mg Oral BID    [Held by provider] furosemide  40 mg Oral Daily    [Held by provider] lisinopril  2.5 mg Oral Daily    pantoprazole  40 mg Oral QAM AC    rosuvastatin  20 mg Oral Daily    tamsulosin  0.4 mg Oral Nightly    sodium chloride flush  5-40 mL IntraVENous 2 times per day    lactobacillus  1 capsule Oral TID WC     Continuous Infusions:    heparin (PORCINE) Infusion      sodium chloride 75 mL/hr at 08/22/22 2319    sodium chloride       PRN Meds: heparin (porcine), heparin (porcine), acetaminophen, albuterol sulfate HFA, nitroGLYCERIN, sodium chloride flush, sodium chloride, potassium chloride **OR** potassium alternative oral replacement **OR** potassium chloride, magnesium sulfate, ondansetron **OR** ondansetron, polyethylene glycol, acetaminophen **OR** acetaminophen    Data:     Past Medical History:   has a past medical history of Abdominal aortic aneurysm without rupture ([facility] Atrial flutter ([facility] Chronic atrial fibrillation, unspecified ([facility] Chronic kidney disease, Combined systolic and diastolic heart failure ([facility] Diverticulosis large intestine w/o perforation or abscess w/bleeding, Hyperlipidemia, Hypotension, Ischemic cardiomyopathy, MI (myocardial infarction) ([facility] MS (multiple sclerosis) ([facility] [address] [facility] Multiple sclerosis ([facility] [address] [facility] Pacemaker, artificial, and Urinary retention. Social History:   reports that he has quit smoking. He has never used smokeless tobacco. He reports that he does not drink alcohol and does not use drugs. Family History: History reviewed. No pertinent family history. Vitals:  /69   Pulse 69   Temp 97.7 °F (36.5 °C) (Oral)   Resp 17   Ht 5' 9\" (1.753 m)   Wt 155 lb 3.3 oz (70.4 kg)   SpO2 97%   BMI 22.92 kg/m²   Temp (24hrs), Av.1 °F (36.7 °C), Min:97.6 °F (36.4 °C), Max:98.6 °F (37 °C)    No results for input(s): POCGLU in the last 72 hours. I/O (24Hr): Intake/Output Summary (Last 24 hours) at 2022 0950  Last data filed at 2022 2769  Gross per 24 hour   Intake 200 ml   Output 675 ml   Net -475 ml       Labs:  Hematology:No results for input(s): WBC, RBC, HGB, HCT, MCV, MCH, MCHC, RDW, PLT, MPV, SEDRATE, CRP, INR, DDIMER, GP0FHQVB, LABABSO in the last 72 hours. Invalid input(s): PT  Chemistry:  Recent Labs     22  0821      K 4.2      CO2 24   GLUCOSE 100*   BUN 22   CREATININE 1.32*   ANIONGAP 9   LABGLOM 53*   GFRAA >60   CALCIUM 8.2*     Recent Labs     22  1427   PROT 5.7*     ABG:No results found for: POCPH, PHART, PH, POCPCO2, ICR8SGA, PCO2, POCPO2, PO2ART, PO2, POCHCO3, DOK3IDV, HCO3, NBEA, PBEA, BEART, BE, THGBART, THB, ZOW3BEN, KRUH1WSJ, J5XMPXNQ, O2SAT, FIO2  Lab Results   Component Value Date/Time    SPECIAL 1ml left arm 2022 01:20 PM     Lab Results   Component Value Date/Time    CULTURE NO GROWTH 4 DAYS 2022 01:20 PM       Radiology:  XR CHEST PORTABLE    Result Date: 2022  No acute process.        Physical Examination:        General appearance:  alert, cooperative and no distress  Mental Status:  oriented to person, place and time and normal affect  Lungs:  clear to auscultation bilaterally, normal effort  Heart:  regular rate and rhythm, no murmur  Abdomen:  soft, nontender, nondistended, normal bowel sounds, no masses, hepatomegaly, splenomegaly  Extremities:  no edema, redness, tenderness in the calves  Skin:  no gross lesions, rashes, induration    Assessment:        Hospital Problems             Last Modified POA    * (Principal) Weakness 8/18/2022 Yes    NSVT (nonsustained ventricular tachycardia) ([address] 8/19/2022 Yes    Dehydration 8/19/2022 Yes    Combined systolic and diastolic cardiac dysfunction 8/19/2022 Yes    Longstanding persistent atrial fibrillation ([address] 8/19/2022 Yes    Monomorphic ventricular tachycardia ([address] 8/21/2022 Yes    Fatigue 8/22/2022 Yes       Plan:        #Ventricular tachycardia  -AICD interrogation revealing multiple runs of ventricular tachycardia. Cardiology planning for cardiac catheterization. Continue oral amiodarone as ordered. #Longstanding persistent atrial fibrillation  -Eliquis on hold for potential cardiac catheterization. Continue metoprolol and amiodarone as ordered. #Multiple sclerosis  -Stable    #Ischemic cardiomyopathy, mixed HFpEF/HFrEF  -Continue goal-directed medical therapy as ordered. Lasix on hold. We will start aspirin 81 mg daily given history of stent placement. #CKD stage IIIa  -Creatinine currently at baseline. PT and OT assessment  Disposition: We will likely need skilled nursing facility at the time of discharge. Anticipate discharge within 48 hours.     Missy Marie PA-C  8/23/2022  9:50 AM

## 2022-08-23 NOTE — PROGRESS NOTES
Occupational [email or web address]  Occupational Therapy Not Seen Note    DATE: 2022    NAME: Philly Pedro  MRN: [id number]   : 1945      Patient not seen this date for Occupational Therapy due to: Cardiac Cath    Next Scheduled Treatment: 22    Electronically signed by LUIS Powell on 2022 at 10:19 AM

## 2022-08-23 NOTE — PROGRESS NOTES
Renal Progress Note    Patient :  You Hernandez; 68 y.o. MRN# [id number]  Location:  2008/2008-01  Attending:  Evert Cisneros DO  Admit Date:  8/18/2022   Hospital Day: 4      Subjective:     Cardiac catheterization done today. Apparently had in-stent stenosis and required 2 new stents to the RCA. Postprocedure seems to be doing well. No shortness of breath orthopnea. Urine output fair although not being measured accurately. No labs from today, creatinine yesterday was 1.3. Denies any shortness of breath or orthopnea. IV fluids continue to offset risk of contrast nephropathy. Ultrasound of the kidney reviewed right kidney 10.5 left 7 cm with increased echogenicity indicating significant atrophy. No more arrhythmia since transferred to [address] [place] [address]. [facility] Oral intake to be resumed. UPC 0.4, complements normal, kappa lambda free light chain ratio 1.76, immunofixation pending  Outpatient Medications:     Medications Prior to Admission: [DISCONTINUED] atorvastatin (LIPITOR) 40 MG tablet, Take 40 mg by mouth daily  tamsulosin (FLOMAX) 0.4 MG capsule, Take 1 capsule by mouth nightly  lisinopril (PRINIVIL;ZESTRIL) 2.5 MG tablet, Take 1 tablet by mouth in the morning. [DISCONTINUED] metoprolol succinate (TOPROL XL) 25 MG extended release tablet, Take 0.5 tablets by mouth in the morning. apixaban (ELIQUIS) 5 MG TABS tablet, Take by mouth 2 times daily  albuterol sulfate HFA (PROVENTIL;VENTOLIN;PROAIR) 108 (90 Base) MCG/ACT inhaler, Inhale 2 puffs into the lungs every 6 hours as needed for Wheezing  Rosuvastatin Calcium 20 MG CPSP, Take by mouth Daily  nitroGLYCERIN (NITROSTAT) 0.4 MG SL tablet, Place 0.4 mg under the tongue every 5 minutes as needed for Chest pain up to max of 3 total doses.  If no relief after 1 dose, call 911.  acetaminophen (TYLENOL) 325 MG tablet, Take 650 mg by mouth every 6 hours as needed for Pain  omeprazole (PRILOSEC) 20 MG delayed release capsule, Take 20 mg by mouth Daily  [DISCONTINUED] muscle use. HEENT: Atraumatic, normocephalic, no throat congestion, moist mucosa. Eyes:   Pupils equal, round and reactive to light, EOMI. Neck:   No JVD, no thyromegaly, no lymphadenopathy. Chest:  Bilateral vesicular breath sounds, occasional expiratory wheezing   cardiac:  S1 S2 RR, no murmurs, gallops or rubs, JVP not raised. Abdomen: Soft, non-tender, no masses or organomegaly, BS audible. :   No suprapubic or flank tenderness. Neuro:   AAO x 3, No FND. SKIN:  No rashes, good skin turgor. Extremities:  ++ edema, palpable peripheral pulses, no calf tenderness. Labs:       Recent Labs     08/23/22  1000   WBC 8.2   RBC 3.67*   HGB 10.2*   HCT 32.3*   MCV 88.0   MCH 27.8   MCHC 31.6   RDW 15.1*      MPV 10.6      BMP:   Recent Labs     08/22/22  0821      K 4.2      CO2 24   BUN 22   CREATININE 1.32*   GLUCOSE 100*   CALCIUM 8.2*      Phosphorus:   No results for input(s): PHOS in the last 72 hours. Magnesium:  No results for input(s): MG in the last 72 hours. Albumin:  No results for input(s): LABALBU in the last 72 hours.   BNP:    No results found for: BNP  JANAE:    No results found for: JANAE  SPEP:  Lab Results   Component Value Date/Time    PROT 5.7 08/22/2022 02:27 PM    ALBCAL PENDING 08/22/2022 02:27 PM    ALBPCT PENDING 08/22/2022 02:27 PM    LABALPH PENDING 08/22/2022 02:27 PM    LABALPH PENDING 08/22/2022 02:27 PM    A1PCT PENDING 08/22/2022 02:27 PM    A2PCT PENDING 08/22/2022 02:27 PM    Germaine Chaudhry PENDING 08/22/2022 02:27 PM    BETAPCT PENDING 08/22/2022 02:27 PM    Rama Wang PENDING 08/22/2022 02:27 PM    GGPCT PENDING 08/22/2022 02:27 PM    PATH PENDING 08/22/2022 02:27 PM     UPEP:   No results found for: LABPE  C3:     Lab Results   Component Value Date/Time    C3 153 08/22/2022 02:27 PM     C4:     Lab Results   Component Value Date/Time    C4 33 08/22/2022 02:27 PM     MPO ANCA:   No results found for: MPO  PR3 ANCA:   No results found for: PR3  Anti-GBM:   No results found for: GBMABIGG  Hep BsAg:       No results found for: HEPBSAG  Hep C AB:        No results found for: HEPCAB    Urinalysis/Chemistries:      Lab Results   Component Value Date/Time    NITRU NEGATIVE 07/27/2022 05:00 PM    COLORU Yellow 07/27/2022 05:00 PM    PHUR 5.5 07/27/2022 05:00 PM    WBCUA 2 TO 5 07/27/2022 05:00 PM    RBCUA 2 TO 5 07/27/2022 05:00 PM    MUCUS 1+ 07/27/2022 05:00 PM    BACTERIA FEW 07/27/2022 05:00 PM    SPECGRAV 1.025 07/27/2022 05:00 PM    LEUKOCYTESUR NEGATIVE 07/27/2022 05:00 PM    UROBILINOGEN Normal 07/27/2022 05:00 PM    BILIRUBINUR NEGATIVE 07/27/2022 05:00 PM    GLUCOSEU NEGATIVE 07/27/2022 05:00 PM    KETUA NEGATIVE 07/27/2022 05:00 PM    AMORPHOUS 1+ 07/27/2022 05:00 PM     Urine Sodium:   No results found for: OBDULIO  Urine Potassium:  No results found for: KUR  Urine Chloride:  No results found for: CLUR  Urine Osmolarity: No results found for: OSMOU  Urine Protein:   No components found for: TOTALPROTEIN, URINE   Urine Creatinine:     Lab Results   Component Value Date/Time    LABCREA 122.0 08/22/2022 04:09 PM     Urine Eosinophils:  No components found for: UEOS    Radiology:     CXR:     Assessment:     1. 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 Renal function stable:  2. Nonsustained monomorphic ventricular tachycardia in a patient with previous history of severe cardiomyopathy,  3. Non-ST elevation MI status postcardiac catheterization and stent placement to RCA  4. Ischemic cardiomyopathy ejection fraction 25 to 30%  5. Multiple sclerosis with motor deficits  6. Solitary functioning right kidney with atrophic left kidney    Plan:   1. Continue fluids till both 5 PM and then discontinue them  2. Start Demadex 20 mg daily  3. Fluid restriction 1.5 L a day  4.   If renal function remained stable patient can be discharged tomorrow and can be followed up as an outpatient either at the [facility] clinic or through our office  5. We will follow with you    Nutrition   Please ensure that patient is on a renal diet/TF. Avoid nephrotoxic drugs/contrast exposure. We will continue to follow along with you.

## 2022-08-23 NOTE — PROGRESS NOTES
Physical Therapy        Physical Therapy Cancel Note      DATE: 2022    NAME: Chanel Mcduffie  MRN: [id number]   : 1945      Patient not seen this date for Physical Therapy due to:      Testing: Pt going for a cardiac cath.       Electronically signed by Reanna Conrad PTA on 2022 at 12:02 PM

## 2022-08-24 VITALS
WEIGHT: 155.2 LBS | OXYGEN SATURATION: 92 % | SYSTOLIC BLOOD PRESSURE: 107 MMHG | BODY MASS INDEX: 22.99 KG/M2 | HEIGHT: 69 IN | HEART RATE: 76 BPM | TEMPERATURE: 98.4 F | DIASTOLIC BLOOD PRESSURE: 67 MMHG | RESPIRATION RATE: 19 BRPM

## 2022-08-24 PROBLEM — N18.32 STAGE 3B CHRONIC KIDNEY DISEASE (HCC): Status: ACTIVE | Noted: 2022-08-24

## 2022-08-24 PROBLEM — I10 ESSENTIAL HYPERTENSION: Status: ACTIVE | Noted: 2022-08-24

## 2022-08-24 PROBLEM — E86.0 DEHYDRATION: Status: RESOLVED | Noted: 2022-08-19 | Resolved: 2022-08-24

## 2022-08-24 LAB
ALBUMIN (CALCULATED): 3.1 G/DL (ref 3.2–5.2)
ALBUMIN PERCENT: 54 % (ref 45–65)
ALPHA 1 PERCENT: 4 % (ref 3–6)
ALPHA 2 PERCENT: 16 % (ref 6–13)
ALPHA-1-GLOBULIN: 0.2 G/DL (ref 0.1–0.4)
ALPHA-2-GLOBULIN: 0.9 G/DL (ref 0.5–0.9)
ANION GAP SERPL CALCULATED.3IONS-SCNC: 12 MMOL/L (ref 9–17)
BETA GLOBULIN: 0.6 G/DL (ref 0.5–1.1)
BETA PERCENT: 11 % (ref 11–19)
BUN BLDV-MCNC: 26 MG/DL (ref 8–23)
CALCIUM SERPL-MCNC: 8.3 MG/DL (ref 8.6–10.4)
CHLORIDE BLD-SCNC: 105 MMOL/L (ref 98–107)
CO2: 22 MMOL/L (ref 20–31)
CREAT SERPL-MCNC: 1.5 MG/DL (ref 0.7–1.2)
GAMMA GLOBULIN %: 16 % (ref 9–20)
GAMMA GLOBULIN: 0.9 G/DL (ref 0.5–1.5)
GFR AFRICAN AMERICAN: 55 ML/MIN
GFR NON-AFRICAN AMERICAN: 45 ML/MIN
GFR SERPL CREATININE-BSD FRML MDRD: ABNORMAL ML/MIN/{1.73_M2}
GLUCOSE BLD-MCNC: 100 MG/DL (ref 70–99)
PARTIAL THROMBOPLASTIN TIME: 24 SEC (ref 20.5–30.5)
PARTIAL THROMBOPLASTIN TIME: 24.8 SEC (ref 20.5–30.5)
PATHOLOGIST: ABNORMAL
PATHOLOGIST: NORMAL
POTASSIUM SERPL-SCNC: 4 MMOL/L (ref 3.7–5.3)
PROTEIN ELECTROPHORESIS, SERUM: ABNORMAL
SERUM IFX INTERP: NORMAL
SODIUM BLD-SCNC: 139 MMOL/L (ref 135–144)
TOTAL PROT. SUM,%: 101 % (ref 98–102)
TOTAL PROT. SUM: 5.7 G/DL (ref 6.3–8.2)
TOTAL PROTEIN: 5.7 G/DL (ref 6.4–8.3)

## 2022-08-24 PROCEDURE — 6370000000 HC RX 637 (ALT 250 FOR IP): Performed by: HOSPITALIST

## 2022-08-24 PROCEDURE — 6370000000 HC RX 637 (ALT 250 FOR IP): Performed by: STUDENT IN AN ORGANIZED HEALTH CARE EDUCATION/TRAINING PROGRAM

## 2022-08-24 PROCEDURE — 2580000003 HC RX 258: Performed by: STUDENT IN AN ORGANIZED HEALTH CARE EDUCATION/TRAINING PROGRAM

## 2022-08-24 PROCEDURE — 85730 THROMBOPLASTIN TIME PARTIAL: CPT

## 2022-08-24 PROCEDURE — 97530 THERAPEUTIC ACTIVITIES: CPT

## 2022-08-24 PROCEDURE — 97535 SELF CARE MNGMENT TRAINING: CPT

## 2022-08-24 PROCEDURE — 97110 THERAPEUTIC EXERCISES: CPT

## 2022-08-24 PROCEDURE — 80048 BASIC METABOLIC PNL TOTAL CA: CPT

## 2022-08-24 PROCEDURE — 36415 COLL VENOUS BLD VENIPUNCTURE: CPT

## 2022-08-24 PROCEDURE — 99232 SBSQ HOSP IP/OBS MODERATE 35: CPT | Performed by: INTERNAL MEDICINE

## 2022-08-24 PROCEDURE — 6370000000 HC RX 637 (ALT 250 FOR IP): Performed by: INTERNAL MEDICINE

## 2022-08-24 PROCEDURE — 99239 HOSP IP/OBS DSCHRG MGMT >30: CPT | Performed by: INTERNAL MEDICINE

## 2022-08-24 RX ORDER — AMIODARONE HYDROCHLORIDE 200 MG/1
200 TABLET ORAL DAILY
Qty: 30 TABLET | Refills: 0 | DISCHARGE
Start: 2022-09-01

## 2022-08-24 RX ORDER — TORSEMIDE 20 MG/1
20 TABLET ORAL DAILY
Qty: 30 TABLET | Refills: 3 | DISCHARGE
Start: 2022-08-25 | End: 2022-09-22

## 2022-08-24 RX ORDER — METOPROLOL SUCCINATE 50 MG/1
50 TABLET, EXTENDED RELEASE ORAL DAILY
Qty: 30 TABLET | Refills: 3 | DISCHARGE
Start: 2022-08-25 | End: 2022-09-28 | Stop reason: DRUGHIGH

## 2022-08-24 RX ORDER — AMIODARONE HYDROCHLORIDE 200 MG/1
200 TABLET ORAL 2 TIMES DAILY
Qty: 16 TABLET | Refills: 0 | Status: ON HOLD | DISCHARGE
Start: 2022-08-24 | End: 2022-09-20 | Stop reason: DRUGHIGH

## 2022-08-24 RX ORDER — CLOPIDOGREL BISULFATE 75 MG/1
75 TABLET ORAL DAILY
Qty: 30 TABLET | Refills: 3 | DISCHARGE
Start: 2022-08-25

## 2022-08-24 RX ADMIN — SODIUM CHLORIDE, PRESERVATIVE FREE 10 ML: 5 INJECTION INTRAVENOUS at 09:50

## 2022-08-24 RX ADMIN — ROSUVASTATIN CALCIUM 20 MG: 20 TABLET, FILM COATED ORAL at 09:47

## 2022-08-24 RX ADMIN — TORSEMIDE 20 MG: 20 TABLET ORAL at 09:52

## 2022-08-24 RX ADMIN — PANTOPRAZOLE SODIUM 40 MG: 40 TABLET, DELAYED RELEASE ORAL at 09:47

## 2022-08-24 RX ADMIN — AMIODARONE HYDROCHLORIDE 200 MG: 200 TABLET ORAL at 09:46

## 2022-08-24 RX ADMIN — METOPROLOL SUCCINATE 50 MG: 50 TABLET, FILM COATED, EXTENDED RELEASE ORAL at 09:46

## 2022-08-24 RX ADMIN — CLOPIDOGREL 75 MG: 75 TABLET, FILM COATED ORAL at 09:47

## 2022-08-24 RX ADMIN — Medication 81 MG: at 09:46

## 2022-08-24 RX ADMIN — APIXABAN 5 MG: 5 TABLET, FILM COATED ORAL at 09:55

## 2022-08-24 RX ADMIN — Medication 1 CAPSULE: at 12:45

## 2022-08-24 RX ADMIN — Medication 1 CAPSULE: at 09:46

## 2022-08-24 ASSESSMENT — PAIN SCALES - GENERAL: PAINLEVEL_OUTOF10: 0

## 2022-08-24 NOTE — PROGRESS NOTES
Nephrology Progress Note      SUBJECTIVE       Pt was seen and examined. No acute issues overnite. Stable hemodynamics . Patient is status post cardiac cath with couple stents to the RCA. He has low normal LV function. His serum creatinine this morning is stable around 1.5. He had excellent urinary output last 24 hours quantified at 2.8 L. Rosas is still in place. Discussed with him about ultrasound findings of an atrophic left kidney. He wishes to follow-up with us in the office following discharge. Patient denies any nausea, vomiting, increasing shortness of breath    OBJECTIVE      CURRENT TEMPERATURE:  Temp: 97.5 °F (36.4 °C)  MAXIMUM TEMPERATURE OVER 24HRS:  Temp (24hrs), Av.7 °F (36.5 °C), Min:97.4 °F (36.3 °C), Max:97.9 °F (36.6 °C)    CURRENT RESPIRATORY RATE:  Resp: 20  CURRENT PULSE:  Heart Rate: 77  CURRENT BLOOD PRESSURE:  BP: (!) 116/56  24HR BLOOD PRESSURE RANGE:  Systolic (27HXO), OSY:020 , Min:100 , BKK:171   ; Diastolic (01MFS), VOZ:28, Min:45, Max:82    24HR INTAKE/OUTPUT:    Intake/Output Summary (Last 24 hours) at 2022 1017  Last data filed at 2022 0400  Gross per 24 hour   Intake --   Output 2875 ml   Net -2875 ml     WEIGHT :Patient Vitals for the past 96 hrs (Last 3 readings):   Weight   22 0600 155 lb 3.3 oz (70.4 kg)   22 1815 156 lb 1.4 oz (70.8 kg)   22 0731 154 lb 12.2 oz (70.2 kg)     PHYSICAL EXAM      GENERAL APPEARANCE:Awake, alert, in no acute distress  SKIN: warm and dry, no rash or erythema  EYES: conjunctivae normal and sclera anicteric  ENT: no thrush no pharyngeal congestion   NECK:   No JVD. No carotid bruits and no carotid lymphadenopathy . PULMONARY: lungs are clear to auscultation. No Wheezing, no ronchi . CADRDIOVASCULAR: S1 and S2 normal NO S3 and NO S4 . No rubs , no murmur. ABDOMEN: soft nontender, bowel sounds present, no organomegaly, no ascites.      EXTREMITIES: no cyanosis, clubbing or edema     CURRENT MEDICATIONS clopidogrel (PLAVIX) tablet 75 mg, Daily  sodium chloride flush 0.9 % injection 5-40 mL, 2 times per day  sodium chloride flush 0.9 % injection 5-40 mL, PRN  0.9 % sodium chloride infusion, PRN  acetaminophen (TYLENOL) tablet 650 mg, Q4H PRN  torsemide (DEMADEX) tablet 20 mg, Daily  aspirin EC tablet 81 mg, Daily  amiodarone (CORDARONE) tablet 200 mg, BID  metoprolol succinate (TOPROL XL) extended release tablet 50 mg, Daily  acetaminophen (TYLENOL) tablet 650 mg, Q6H PRN  albuterol sulfate HFA (PROVENTIL;VENTOLIN;PROAIR) 108 (90 Base) MCG/ACT inhaler 2 puff, Q6H PRN  apixaban (ELIQUIS) tablet 5 mg, BID  [Held by provider] lisinopril (PRINIVIL;ZESTRIL) tablet 2.5 mg, Daily  nitroGLYCERIN (NITROSTAT) SL tablet 0.4 mg, Q5 Min PRN  pantoprazole (PROTONIX) tablet 40 mg, QAM AC  rosuvastatin (CRESTOR) tablet 20 mg, Daily  tamsulosin (FLOMAX) capsule 0.4 mg, Nightly  sodium chloride flush 0.9 % injection 5-40 mL, 2 times per day  sodium chloride flush 0.9 % injection 10 mL, PRN  0.9 % sodium chloride infusion, PRN  potassium chloride (KLOR-CON M) extended release tablet 40 mEq, PRN   Or  potassium bicarb-citric acid (EFFER-K) effervescent tablet 40 mEq, PRN   Or  potassium chloride 10 mEq/100 mL IVPB (Peripheral Line), PRN  magnesium sulfate 1000 mg in dextrose 5% 100 mL IVPB, PRN  ondansetron (ZOFRAN-ODT) disintegrating tablet 4 mg, Q8H PRN   Or  ondansetron (ZOFRAN) injection 4 mg, Q6H PRN  polyethylene glycol (GLYCOLAX) packet 17 g, Daily PRN  acetaminophen (TYLENOL) tablet 650 mg, Q6H PRN   Or  acetaminophen (TYLENOL) suppository 650 mg, Q6H PRN  lactobacillus (CULTURELLE) capsule 1 capsule, TID WC          LABS      CBC:   Recent Labs     08/23/22  1000   WBC 8.2   RBC 3.67*   HGB 10.2*   HCT 32.3*   MCV 88.0   MCH 27.8   MCHC 31.6   RDW 15.1*      MPV 10.6      BMP:   Recent Labs     08/22/22  0821 08/24/22  0258    139   K 4.2 4.0    105   CO2 24 22   BUN 22 26*   CREATININE 1.32* 1.50* GLUCOSE 100* 100*   CALCIUM 8.2* 8.3*        SPEP:   Lab Results   Component Value Date/Time    PROT 5.7 08/22/2022 02:27 PM    ALBCAL 3.1 08/22/2022 02:27 PM    ALBPCT 54 08/22/2022 02:27 PM    LABALPH 0.2 08/22/2022 02:27 PM    LABALPH 0.9 08/22/2022 02:27 PM    A1PCT 4 08/22/2022 02:27 PM    A2PCT 16 08/22/2022 02:27 PM    LABBETA 0.6 08/22/2022 02:27 PM    BETAPCT 11 08/22/2022 02:27 PM    GAMGLOB 0.9 08/22/2022 02:27 PM    GGPCT 16 08/22/2022 02:27 PM    PATH ELECTRONICALLY SIGNED. Qing Pope M.D. 08/22/2022 02:27 PM    PATH ELECTRONICALLY SIGNED. Qing Pope M.D. 08/22/2022 02:27 PM       C3:   Lab Results   Component Value Date    C3 153 08/22/2022     C4:   Lab Results   Component Value Date    C4 33 08/22/2022       URINE CREATININE:    Lab Results   Component Value Date/Time    LABCREA 122.0 08/22/2022 04:09 PM     URINALYSIS:  U/A:   Lab Results   Component Value Date/Time    NITRU NEGATIVE 07/27/2022 05:00 PM    COLORU Yellow 07/27/2022 05:00 PM    PHUR 5.5 07/27/2022 05:00 PM    WBCUA 2 TO 5 07/27/2022 05:00 PM    RBCUA 2 TO 5 07/27/2022 05:00 PM    MUCUS 1+ 07/27/2022 05:00 PM    BACTERIA FEW 07/27/2022 05:00 PM    SPECGRAV 1.025 07/27/2022 05:00 PM    LEUKOCYTESUR NEGATIVE 07/27/2022 05:00 PM    UROBILINOGEN Normal 07/27/2022 05:00 PM    BILIRUBINUR NEGATIVE 07/27/2022 05:00 PM    GLUCOSEU NEGATIVE 07/27/2022 05:00 PM    KETUA NEGATIVE 07/27/2022 05:00 PM    AMORPHOUS 1+ 07/27/2022 05:00 PM     Renal ultrasound shows left kidney 7 cm, right kidney 10.5 cm with no evidence of hydronephrosis. ASSESSMENT      1. CKD stage IIIa with a creatinine of 1.2-1.4 baseline likely secondary to ischemic nephrosclerosis, has an atrophic appearing left kidney, normal-sized right kidney. 2. HTN: Stable blood pressures  3. ST elevation MI status post cardiac cath and couple stent placements to the RCA  4. Ischemic cardiomyopathy with ejection fraction of 25 to 30%.   5.  History of multiple sclerosis with some motor deficits. 6.  Nonsustained monomorphic VT. 7.  Volume status looks okay    PLAN      1. Continue Demadex 20 mg a day, 2 g sodium and 1.5 L fluid restriction per day   2. Remove Rosas catheter from my side  3. Follow up labs ordered. 4.  Discharge from renal standpoint, follow-up with Dr. Kavitha Vaca in 4 weeks      Please do not hesitate to call with questions.     This note is created with the assistance of a speech-recognition program. While intending to generate a document that actually reflects the content of the visit, no guarantees can be provided that every mistake has been identified and corrected by editing    Electronically signed by Meredith Serrato MD on 8/24/2022 at 10:17 AM

## 2022-08-24 NOTE — PROGRESS NOTES
[facility]  Office: [address], DO, Gwen Nyhan, DO, Lucy Mann, DO, Therese Mariposa Blood, DO, Zacarias Vela MD, Amy Ignacio MD, Renato Scott MD, Michele Kendall MD,  Rosalinda Morataya MD, Nicolasa Jones MD, Rylie Blackwood, DO, Chaz Ware MD,  Fred Riley MD, Barbi Ceja MD, Bobby Murillo, DO, Arnulfo Birch MD, Connor Doty MD, Cristopher Torres MD, Rashawn Garay DO, Deven Faulkner MD, Titus Baeza MD, Zora Pickett, CNP,  Frederick Toth, CNP, Delgado Cho, CNP, Loyda Mansfield, CNP, Keila Esqueda PA-C, Betty Bright, [facility], Delonte Bautista, CNP, Elder Hitchcock, CNP, Davide Sanders, CNP, Hiral Saldivar, CNP, Trav Beck, CNP, Yolanda Talamantes, CNS, Jeff Nolen, [facility], Morris Snow, CNP, Clive Manning, CNP, Luz Armando, [facility] [address]    Progress Note    8/24/2022    12:06 PM    Name:   Chanel Mcduffie  MRN:     [id number]     Tito Levo:      [de-identified]   Room:   2008/2008-01  IP Day:  5  Admit Date:  8/18/2022 11:58 AM    PCP:   Bren Bowman MD  Code Status:  Full Code    Subjective:     C/C:   Chief Complaint   Patient presents with    Fatigue     X3-4 days since the diarrhea- pt has been home for  days-[place] [facility] [place]    Diarrhea     X3 days - denies any N/V     Interval History Status: unchanged. Pt seen and evaluated this morning. He is feeling better. He underwent LHC yesterday and two stents were placed. No new complaints. Brief History: This is a 26-year-old male with past medical history significant for multiple sclerosis, ischemic cardiomyopathy status post AICD placement, history of coronary artery disease status post PCI. Initially presented to outside hospital for weakness and fatigue.   His AICD was interrogated and revealing ventricular tachycardia prompting his transfer to [facility] [place]. He has been evaluated by cardiology who is recommending cardiac catheterization. 8/23: Left heart catheterization    Findings:  LMCA: Normal 0% stenosis. LAD: Mild irregularities 20-30%. LCx: Mild irregularities 10-20%. RCA: Dominant  mid 100% stenosis  Lesion on Mid RCA: Mid subsection. 100% stenosis 20 mm length reduced to 10%. Pre procedure  DANE 0 flow was noted. Post Procedure DANE III flow was present. Poor runoff was present. The lesion  was diagnosed as High Risk (C). Devices used  l Cross It Wire 190cm. Number of passes: 1.  l Euphora Balloon 1.5mm x 12mm. 8 inflation(s) to a max pressure of: 14 jewels.  l Euphora Balloon 2.5mm x 30mm. 1 inflation(s) to a max pressure of: 19 jewels. l 6 fr GuideLiner. Number of passes: 1.  l Resolute [place] 4.0 x 38 NICK. 2 inflation(s) to a max pressure of: 24 jewels.  l NC Euphora Balloon 4.5mm x 12mm. 7 inflation(s) to a max pressure of: 29 jewels. Lesion on Dist RCA: Mid subsection. 100% stenosis 12 mm length reduced to 20%. Pre procedure  DANE 0 flow was noted. Post Procedure DANE III flow was present. Poor runoff was present. The lesion  was diagnosed as High Risk (C). Devices used  l Mini Trek Balloon 1.20mm x 12mm. 3 inflation(s) to a max pressure of: 14 jewels.  l Euphora Balloon 2.5mm x 30mm. 3 inflation(s) to a max pressure of: 12 jewels.  l Luge Wire 182 cm. Number of passes: 1.  l Resolute Vinay 3.0 x 26 NICK. 1 inflation(s) to a max pressure of: 12 jewels. Procedure Summary  Single significant vessel disease 100% in previous mid stent  Successful PTCA -NICK of the  mid and distal RCA  Lower normal LV function  Recommendations  Post stent protocol    Continue plavix, eliquis. Follow-up with cardiologist as outpatient.      Review of Systems:     Constitutional:  negative for chills, fevers, sweats  Respiratory:  negative for cough, dyspnea on exertion, shortness of breath, wheezing  Cardiovascular:  negative for chest pain, chest pressure/discomfort, lower extremity edema, palpitations  Gastrointestinal:  negative for abdominal pain, constipation, diarrhea, nausea, vomiting  Neurological:  negative for dizziness, headache    Medications: Allergies: Allergies   Allergen Reactions    Codeine     Doxycycline     Erythromycin     Gammagard [Immune Globulin]     Sulfa Antibiotics Hives       Current Meds:   Scheduled Meds:    clopidogrel  75 mg Oral Daily    sodium chloride flush  5-40 mL IntraVENous 2 times per day    torsemide  20 mg Oral Daily    aspirin  81 mg Oral Daily    amiodarone  200 mg Oral BID    metoprolol succinate  50 mg Oral Daily    apixaban  5 mg Oral BID    [Held by provider] lisinopril  2.5 mg Oral Daily    pantoprazole  40 mg Oral QAM AC    rosuvastatin  20 mg Oral Daily    tamsulosin  0.4 mg Oral Nightly    sodium chloride flush  5-40 mL IntraVENous 2 times per day    lactobacillus  1 capsule Oral TID WC     Continuous Infusions:    sodium chloride      sodium chloride       PRN Meds: sodium chloride flush, sodium chloride, acetaminophen, acetaminophen, albuterol sulfate HFA, nitroGLYCERIN, sodium chloride flush, sodium chloride, potassium chloride **OR** potassium alternative oral replacement **OR** potassium chloride, magnesium sulfate, ondansetron **OR** ondansetron, polyethylene glycol, acetaminophen **OR** acetaminophen    Data:     Past Medical History:   has a past medical history of Abdominal aortic aneurysm without rupture (HCC), Atrial flutter ([facility] Chronic atrial fibrillation, unspecified ([facility] Chronic kidney disease, Combined systolic and diastolic heart failure ([facility] Diverticulosis large intestine w/o perforation or abscess w/bleeding, Hyperlipidemia, Hypotension, Ischemic cardiomyopathy, MI (myocardial infarction) ([facility] MS (multiple sclerosis) ([facility] Multiple sclerosis ([facility] Pacemaker, artificial, and Urinary retention. Social History:   reports that he has quit smoking.  He has never used smokeless tobacco. He reports that he does not drink alcohol and does not use drugs. Family History: History reviewed. No pertinent family history. Vitals:  /67   Pulse 76   Temp 98.4 °F (36.9 °C) (Oral)   Resp 19   Ht 5' 9\" (1.753 m)   Wt 155 lb 3.3 oz (70.4 kg)   SpO2 92%   BMI 22.92 kg/m²   Temp (24hrs), Av.8 °F (36.6 °C), Min:97.5 °F (36.4 °C), Max:98.4 °F (36.9 °C)    No results for input(s): POCGLU in the last 72 hours. I/O (24Hr): Intake/Output Summary (Last 24 hours) at 2022 1206  Last data filed at 2022 0400  Gross per 24 hour   Intake --   Output 2875 ml   Net -2875 ml       Labs:  Hematology:  Recent Labs     22  1000   WBC 8.2   RBC 3.67*   HGB 10.2*   HCT 32.3*   MCV 88.0   MCH 27.8   MCHC 31.6   RDW 15.1*      MPV 10.6     Chemistry:  Recent Labs     22  0821 22  0258    139   K 4.2 4.0    105   CO2 24 22   GLUCOSE 100* 100*   BUN 22 26*   CREATININE 1.32* 1.50*   ANIONGAP 9 12   LABGLOM 53* 45*   GFRAA >60 55*   CALCIUM 8.2* 8.3*     Recent Labs     22  1427   PROT 5.7*     ABG:No results found for: POCPH, PHART, PH, POCPCO2, QDF6WVM, PCO2, POCPO2, PO2ART, PO2, POCHCO3, JTC1SXF, HCO3, NBEA, PBEA, BEART, BE, THGBART, THB, BFZ7WJP, WEJF4YCF, J3NVASLJ, O2SAT, FIO2  Lab Results   Component Value Date/Time    SPECIAL 1ml left arm 2022 01:20 PM     Lab Results   Component Value Date/Time    CULTURE NO GROWTH 5 DAYS 2022 01:20 PM       Radiology:  XR CHEST PORTABLE    Result Date: 2022  No acute process.        Physical Examination:        General appearance:  alert, cooperative and no distress  Mental Status:  oriented to person, place and time and normal affect  Lungs:  clear to auscultation bilaterally, normal effort  Heart:  regular rate and rhythm, no murmur  Abdomen:  soft, nontender, nondistended, normal bowel sounds, no masses, hepatomegaly, splenomegaly  Extremities:  no edema, redness, tenderness in the calves  Skin:  no gross lesions, rashes, induration    Assessment:        Hospital Problems             Last Modified POA    * (Principal) Weakness 8/18/2022 Yes    NSVT (nonsustained ventricular tachycardia) ([facility] 8/19/2022 Yes    Dehydration 8/19/2022 Yes    Combined systolic and diastolic cardiac dysfunction 8/19/2022 Yes    Longstanding persistent atrial fibrillation ([address] 8/19/2022 Yes    Monomorphic ventricular tachycardia ([address] 8/21/2022 Yes    Fatigue 8/22/2022 Yes    Stage 3b chronic kidney disease ([address] 8/24/2022 Yes    Essential hypertension 8/24/2022 Yes       Plan:        #Ventricular tachycardia  -AICD interrogation revealing multiple runs of ventricular tachycardia. Underwent LHC yesterday, two stents placed. Report detailed above. Continue eliquis, plavix. Continue amiodarone. #Longstanding persistent atrial fibrillation  -Resume Eliquis. Continue metoprolol and amiodarone as ordered. #Multiple sclerosis  -Stable    #Ischemic cardiomyopathy, mixed HFpEF/HFrEF  -Continue goal-directed medical therapy as ordered. Lasix on hold. We will start aspirin 81 mg daily given history of stent placement. #CKD stage IIIa  -Creatinine currently at baseline.     PT and OT assessment      oSuleymane Stern PA-C  8/24/2022  12:06 PM

## 2022-08-24 NOTE — PROGRESS NOTES
Occupational Therapy  Facility/Department: [facility]- [facility]  Occupational Therapy Daily Progress Note    Name: Bianka Ritchie  : 1945  MRN: [id number]  Date of Service: 2022    Discharge Recommendations:  Patient would benefit from continued therapy after discharge          Patient Diagnosis(es): The primary encounter diagnosis was Fatigue, unspecified type. Diagnoses of Diarrhea, unspecified type, Renal insufficiency, and Elevated troponin were also pertinent to this visit. Past Medical History:  has a past medical history of Abdominal aortic aneurysm without rupture (HCC), Atrial flutter ([address] Chronic atrial fibrillation, unspecified ([address] Chronic kidney disease, Combined systolic and diastolic heart failure ([address] Diverticulosis large intestine w/o perforation or abscess w/bleeding, Hyperlipidemia, Hypotension, Ischemic cardiomyopathy, MI (myocardial infarction) ([facility] MS (multiple sclerosis) ([facility] Multiple sclerosis ([facility] Pacemaker, artificial, and Urinary retention. Past Surgical History:  has a past surgical history that includes pacemaker placement and Coronary angioplasty with stent. Assessment   Performance deficits / Impairments: Decreased functional mobility ; Decreased ADL status; Decreased cognition;Decreased high-level IADLs;Decreased endurance;Decreased fine motor control;Decreased coordination;Decreased strength;Decreased balance;Decreased safe awareness  Prognosis: Good  Decision Making: Medium Complexity  REQUIRES OT FOLLOW-UP: Yes  Activity Tolerance  Activity Tolerance: Patient limited by fatigue;Patient Tolerated treatment well        Plan   Plan  Times per Week: 5-6x/wk  Times per Day: Daily  Current Treatment Recommendations: Balance training, Functional mobility training, Endurance training, Strengthening, Equipment evaluation, education, & procurement, Safety education & training, Patient/Caregiver education & training, Self-Care / ADL, Home management training X2;Minimum assistance  Stand to Sit: Moderate assistance; Additional time; Adaptive equipment;Minimum assistance;Assist X2        ADL  Grooming: Stand by assistance;Verbal cueing;Setup  Grooming Skilled Clinical Factors: A needed to open zippers/containers and to squeeze toothpaste on toothbrush d/t B tremors in B hands Set up on tray table, sitting EOB, pt. completed brushing teeth/washing face/combing hair and shaving face at min A- SBA level, with increase time and effort, using compensatory strategies. Toileting Skilled Clinical Factors: Pt. transported to toilet with use of Omar Damien steady, however pt. decline to trial toilet transfer d/t fear of not being able to transfer off of toilet d/t low surface, despite max encouragement and 2 A. Additional Comments: Pt. niranjan CASILLAS tolerance to grooming activity, sitting EOB, needing increased time and effort d/t B UE tremors and FMC/GMC issues. Activity Tolerance  Activity Tolerance: Patient tolerated treatment well;Patient limited by fatigue           Cognition  Overall Cognitive Status: Exceptions  Arousal/Alertness: Delayed responses to stimuli  Following Commands: Follows one step commands with increased time; Follows one step commands with repetition  Attention Span: Attends with cues to redirect  Safety Judgement: Decreased awareness of need for assistance;Decreased awareness of need for safety  Problem Solving: Assistance required to generate solutions;Assistance required to identify errors made;Assistance required to implement solutions  Insights: Decreased awareness of deficits  Initiation: Requires cues for some  Sequencing: Requires cues for some  Orientation  Overall Orientation Status: Within Functional Limits  Orientation Level: Oriented X4                  Education Given To: Patient  Education Provided: Equipment;Precautions;Transfer Training;Energy Conservation; ADL Adaptive Strategies; Fall Prevention Strategies  Education Method: Demonstration;Verbal  Barriers to Learning: Hearing;None  Education Outcome: Verbalized understanding;Continued education needed;Demonstrated understanding                                                              AM-PAC Score        AM-PAC Inpatient Daily Activity Raw Score: 15 (08/24/22 1439)  AM-PAC Inpatient ADL T-Scale Score : 34.69 (08/24/22 1439)  ADL Inpatient CMS 0-100% Score: 56.46 (08/24/22 1439)  ADL Inpatient CMS G-Code Modifier : CK (08/24/22 1439)           Goals  Short Term Goals  Time Frame for Short term goals: 14 visits  Short Term Goal 1: Pt will perform grooming/UB ADL tasks with mod IND using AE/DME PRN  Short Term Goal 2: Pt will perform toileting/LB ADL tasks with CGA and use of AE/DME PRN  Short Term Goal 3: Demo 1+ minute standing tolerance with use of least restrictive AD for increased participation in ADL tasks  Short Term Goal 4: Perform functional transfers with CGA and use of least restrictive AD for increased independence with ADL tasks  Short Term Goal 5:  Independently demo good safety awareness during engagement in all ADLs and functional transfers/functional mobility       Therapy Time   Individual Concurrent Group Co-treatment   Time In 0848         Time Out 0942         Minutes 54         Timed Code Treatment Minutes: [address] [facility], BRADY/

## 2022-08-24 NOTE — PLAN OF CARE
Problem: Discharge Planning  Goal: Discharge to home or other facility with appropriate resources  8/24/2022 0444 by Karla Araujo RN  Outcome: Progressing  8/23/2022 1835 by Tom Turner RN  Outcome: Progressing     Problem: Safety - Adult  Goal: Free from fall injury  8/23/2022 1835 by Tom Turner RN  Outcome: Progressing     Problem: Skin/Tissue Integrity  Goal: Absence of new skin breakdown  Description: 1. Monitor for areas of redness and/or skin breakdown  2. Assess vascular access sites hourly  3. Every 4-6 hours minimum:  Change oxygen saturation probe site  4. Every 4-6 hours:  If on nasal continuous positive airway pressure, respiratory therapy assess nares and determine need for appliance change or resting period.   Outcome: Progressing     Problem: Respiratory - Adult  Goal: Achieves optimal ventilation and oxygenation  Outcome: Progressing

## 2022-08-24 NOTE — PROGRESS NOTES
[facility]   Progress Note                   Date:   8/24/2022  Patient name: Peter Rudolph  Date of admission:  8/18/2022 11:58 AM  MRN:   [id number]  YOB: 1945  PCP: Tamika Salgado MD    Reason for Admission:     Subjective:       Clinical Changes / Abnormalities:  Seen and examined alone in room after discussion with RN. Denies any CP or SOB. Tele remains SR with PVCs.      Medications:   Scheduled Meds:   Current Facility-Administered Medications:     clopidogrel (PLAVIX) tablet 75 mg, 75 mg, Oral, Daily, Lennox Simpers, MD, 75 mg at 08/24/22 0947    sodium chloride flush 0.9 % injection 5-40 mL, 5-40 mL, IntraVENous, 2 times per day, Lennox Simpers, MD, 10 mL at 08/24/22 0950    sodium chloride flush 0.9 % injection 5-40 mL, 5-40 mL, IntraVENous, PRN, Lennox Simpers, MD    0.9 % sodium chloride infusion, , IntraVENous, PRN, Lennox Simpers, MD    acetaminophen (TYLENOL) tablet 650 mg, 650 mg, Oral, Q4H PRN, Lennox Simpers, MD    torsemide [facility]) tablet 20 mg, 20 mg, Oral, Daily, Yojana Worley MD, 20 mg at 08/24/22 9550    aspirin EC tablet 81 mg, 81 mg, Oral, Daily, Lennox Simpers, MD, 81 mg at 08/24/22 0971    amiodarone (CORDARONE) tablet 200 mg, 200 mg, Oral, BID, Lennox Simpers, MD, 200 mg at 08/24/22 0946    metoprolol succinate (TOPROL XL) extended release tablet 50 mg, 50 mg, Oral, Daily, Lennox Simpers, MD, 50 mg at 08/24/22 0946    acetaminophen (TYLENOL) tablet 650 mg, 650 mg, Oral, Q6H PRN, Lennox Simpers, MD    albuterol sulfate HFA (PROVENTIL;VENTOLIN;PROAIR) 108 (90 Base) MCG/ACT inhaler 2 puff, 2 puff, Inhalation, Q6H PRN, Lennox Simpers, MD, 2 puff at 08/21/22 0840    apixaban (ELIQUIS) tablet 5 mg, 5 mg, Oral, BID, Claretha [place], DO, 5 mg at 08/24/22 8898    [Held by provider] lisinopril (PRINIVIL;ZESTRIL) tablet 2.5 mg, 2.5 mg, Oral, Daily, Claretha [place], DO    nitroGLYCERIN (NITROSTAT) SL tablet 0.4 mg, 0.4 mg, SubLINGual, Q5 Min PRN, Lennox Simpers, MD    pantoprazole (PROTONIX) tablet 40 mg, 40 mg, Oral, QAM AC, Castillo Navarro MD, 40 mg at 08/24/22 0947    rosuvastatin (CRESTOR) tablet 20 mg, 20 mg, Oral, Daily, Castillo Navarro MD, 20 mg at 08/24/22 0947    tamsulosin [employer]) capsule 0.4 mg, 0.4 mg, Oral, Nightly, Castillo Navarro MD, 0.4 mg at 08/23/22 2135    sodium chloride flush 0.9 % injection 5-40 mL, 5-40 mL, IntraVENous, 2 times per day, Castillo Navarro MD, 10 mL at 08/23/22 2140    sodium chloride flush 0.9 % injection 10 mL, 10 mL, IntraVENous, PRN, Castillo Navarro MD    0.9 % sodium chloride infusion, , IntraVENous, PRN, Castillo Navarro MD    potassium chloride (KLOR-CON M) extended release tablet 40 mEq, 40 mEq, Oral, PRN **OR** potassium bicarb-citric acid (EFFER-K) effervescent tablet 40 mEq, 40 mEq, Oral, PRN **OR** potassium chloride 10 mEq/100 mL IVPB (Peripheral Line), 10 mEq, IntraVENous, PRN, Castillo Navarro MD    magnesium sulfate 1000 mg in dextrose 5% 100 mL IVPB, 1,000 mg, IntraVENous, PRN, Castillo Navarro MD    ondansetron (ZOFRAN-ODT) disintegrating tablet 4 mg, 4 mg, Oral, Q8H PRN **OR** ondansetron (ZOFRAN) injection 4 mg, 4 mg, IntraVENous, Q6H PRN, Castillo Navarro MD    polyethylene glycol (GLYCOLAX) packet 17 g, 17 g, Oral, Daily PRN, Castillo Navarro MD    acetaminophen (TYLENOL) tablet 650 mg, 650 mg, Oral, Q6H PRN, 650 mg at 08/23/22 1551 **OR** acetaminophen (TYLENOL) suppository 650 mg, 650 mg, Rectal, Q6H PRN, Castillo Navarro MD    lactobacillus (CULTURELLE) capsule 1 capsule, 1 capsule, Oral, TID WC, Castillo Navarro MD, 1 capsule at 08/24/22 0946   Continuous Infusions:   sodium chloride      sodium chloride       CBC:   Recent Labs     08/23/22  1000   WBC 8.2   HGB 10.2*          BMP:    Recent Labs     08/22/22  0821 08/24/22  0258    139   K 4.2 4.0    105   CO2 24 22   BUN 22 26*   CREATININE 1.32* 1.50*   GLUCOSE 100* 100*       Hepatic:   No results for input(s): AST, ALT, ALB, BILITOT, ALKPHOS in the last 72 hours. Troponin:   No results for input(s): TROPHS in the last 72 hours.     BNP: No results for input(s): BNP in the last 72 hours. Lipids: No results for input(s): CHOL, HDL in the last 72 hours. Invalid input(s): LDLCALCU  INR:   No results for input(s): INR in the last 72 hours. Objective:   Vitals: BP (!) 116/56   Pulse 77   Temp 97.5 °F (36.4 °C) (Axillary)   Resp 20   Ht 5' 9\" (1.753 m)   Wt 155 lb 3.3 oz (70.4 kg)   SpO2 98%   BMI 22.92 kg/m²   General appearance: alert and cooperative with exam  HEENT: Head: Normocephalic, no lesions, without obvious abnormality. Neck: no adenopathy, no carotid bruit, no JVD, supple, symmetrical, trachea midline and thyroid not enlarged, symmetric, no tenderness/mass/nodules  Lungs: very diminished to auscultation bilaterally with fine expiratory wheezing. No rales  Heart: regular rate and rhythm, S1, S2 normal, no murmur, click, rub or gallop, SR with PVCs  Abdomen: soft, non-tender; bowel sounds normal; no masses,  no organomegaly  Extremities: extremities normal, atraumatic, no cyanosis or edema  Neurologic: Mental status: Alert, oriented, thought content appropriate    EKG:   Results for orders placed or performed during the hospital encounter of 08/18/22   EKG 12 Lead   Result Value Ref Range    Ventricular Rate 80 BPM    Atrial Rate 66 BPM    QRS Duration 174 ms    Q-T Interval 454 ms    QTc Calculation (Bazett) 523 ms    P Axis 54 degrees    R Axis -109 degrees    T Axis 20 degrees    Narrative    Ventricular-paced rhythm with occasional Premature ventricular complexes  Abnormal ECG  When compared with ECG of 18-AUG-2022 16:45,  Vent.  rate has decreased BY  12 BPM     ECHO:   Results for orders placed or performed during the hospital encounter of 07/21/22   ECHO Complete 2D W Doppler W Color   Result Value Ref Range    Left Ventricular Ejection Fraction 28     LVEF MODALITY ECHO     Narrative    [address] [place] [address]    Transthoracic Echocardiography Report (TTE)     Patient Name Moe Fallon       Date of Study                 07/22/2022 Tammy Sampson      Date of      1945  Gender                        Male   Birth      Age          68 year(s)  Race                                Room Number         Height:                       69 inch, 175.26 cm      Corporate ID [id number]    Weight:                       172 pounds, 78 kg   #      Patient Acct [de-identified]   BSA:           1.94 m^2       BMI:      25.4   #                                                                kg/m^2      MR #         [id number]     Sonographer                   Kiesha Win      Accession #  [id number]  Interpreting Physician        [address] [facility]      Fellow                   Referring Nurse Practitioner      Interpreting             Referring Physician           Nikki Coombs     Type of Study      TTE procedure:2D Echocardiogram, M-Mode, Doppler, Color Doppler. Procedure Date  Date: 07/22/2022 Start: 07:18 AM    Study Location: [facility]  Technical Quality: Limited visualization due to poor acoustical window. Indications:Chest pain. History / Tech. Comments:  Procedure explained to patient. History of HDL, HTN, MI, multiple sclerosis,  former smoker, systolic heart failure, peripheral vascular disease,  pacemaker/defibrillator    Patient Status: Inpatient    Height: 69 inches Weight: 172 pounds BSA: 1.94 m^2 BMI: 25.4 kg/m^2    HR: 72 bpm BP: 105/46 mmHg    CONCLUSIONS    Summary  Technically difficult study. Left ventricle is normal in size and normal left ventricular wall thickness. Global left ventricular systolic function is severely reduced Estimated  ejection fraction is 25-30%. Right ventricle is normal in size with reduced function. Mild aortic insufficiency. Mild mitral regurgitation. Mild tricuspid regurgitation. Estimated right ventricular systolic pressure  is 25.12 mmHg. Aortic root and ascending aorta are mildly dilated measuring 3.9 cm.   IVC Increased diameter, but still has inspiratory variation suggesting upper  normal or mildly elevated RA filling pressure (i.e. CVP) . Signature  ----------------------------------------------------------------------------   Electronically signed by oZe Montiel(Sonographer) on 07/22/2022 08:38 AM  ----------------------------------------------------------------------------    ----------------------------------------------------------------------------   Electronically signed by Abdi Santo(Interpreting physician) on 07/22/2022   02:27 PM  ----------------------------------------------------------------------------  FINDINGS  Left Atrium  Left atrium is normal in size. Inter-atrial septum is intact with no evidence for an atrial septal defect  by color doppler. Left Ventricle  Left ventricle is normal in size and normal left ventricular wall thickness. Global left ventricular systolic function is severely reduced Estimated  ejection fraction is 25-30%. Right Atrium  Right atrium appears normal in size. Pacemaker / ICD lead seen in right atrium. Right Ventricle  Right ventricle is normal in size with reduced function. Pacemaker / ICD lead seen in right ventricle. Mitral Valve  Normal mitral valve structure. Mild mitral regurgitation. No mitral stenosis. Aortic Valve  Aortic leaflet calcification. Mild aortic insufficiency. No aortic stenosis. Tricuspid Valve  The tricuspid valve was not well visualized. Mild tricuspid regurgitation. Estimated right ventricular systolic pressure is 41.72 mmHg. No tricuspid stenosis. Pulmonic Valve  Pulmonic valve not well visualized but Doppler velocities are normal.  No evidence of pulmonic stenosis. Pericardial Effusion  No significant pericardial effusion is seen. Pleural Effusion  No pleural effusion seen. Miscellaneous  Aortic root and ascending aorta are mildly dilated measuring 3.9 cm.   IVC Increased diameter, but still has inspiratory variation suggesting upper  normal or mildly elevated RA filling pressure (i.e. CVP) . E/E' average = 15.95.    M-mode / 2D Measurements & Calculations:      LVIDd:5.5 cm(3.7 - 5.6 cm)       Diastolic RNOOQC:296 ml   EVMFJ:1.6 cm(2.2 - 4.0 cm)       Systolic CFHNHD:38.1 ml   IVSd:1.1 cm(0.6 - 1.1 cm)        Aortic Root:3.9 cm(2.0 - 3.7 cm)   LVPWd:1.1 cm(0.6 - 1.1 cm)       LA Dimension: 3.8 cm(1.9 - 4.0 cm)   Fractional Shortenin.36 %    LA volume/Index: 64.8 ml /33m^2   Calculated LVEF (%): 36.17 %     LVOT:2.4 cm                                    RVDd:4.1 cm      Mitral:                                  Aortic      Valve Area (P1/2-Time): 2.82 cm^2        Peak Velocity: 1.59 m/s   Peak E-Wave: 0.80 m/s                    Mean Velocity: 1.09 m/s   Peak A-Wave: 0.81 m/s                    Peak Gradient: 10.11 mmHg   E/A Ratio: 0.99                          Mean Gradient: 6 mmHg   Peak Gradient: 2.56 mmHg   Deceleration Time: 267 msec   P1/2t: 78 msec                           Area (continuity): 1.72 cm^2                                            AV VTI: 32.9 cm      Tricuspid:                               Pulmonic:      Peak TR Velocity: 2.39 m/s               Peak Velocity: 0.78 m/s   Peak TR Gradient: 22.8484 mmHg           Peak Gradient: 2.43 mmHg   Estimated RA Pressure: 10 mmHg                                               Estimated PASP: 32.85 mmHg     Septal Wall E' velocity:0.05 m/s  Lateral Wall E' velocity:0.06 m/s     No results found for this or any previous visit. No results found for this or any previous visit.     Results for orders placed during the hospital encounter of 22    Echocardiogram Limited 2D Adult    Narrative  [address] [place] [address]    Transthoracic Echocardiography Report (TTE)    Patient Name Luz Leblanc       Date of Study               2022  Aly Godoy    Date of      1945  Gender                      Male  Birth    Age          68 year(s)  Race                            Room Number         Height: 69 inch, 175.26 cm    Corporate ID [id number]    Weight:                     157 pounds, 71.2 kg  #    Patient Acct [de-identified]   BSA:          1.86 m^2      BMI:     23.18 kg/m^2  #    MR #         [id number]     Sonographer                 Robyn Dugan    Accession #  [id number]  Interpreting Physician      [address] [place] [address]    Fellow                   Referring Nurse  Practitioner    Interpreting             Referring Physician         Dean Pritchett  Fellow    Type of Study    TTE procedure:2D Echocardiogram, Limited Echo. Procedure Date  Date: 08/19/2022 Start: 10:24 AM    Study Location: [facility]  Technical Quality: Fair visualization    Indications:Pericardial effusion and Ventricular tachycardia. History / Tech. Comments:  Procedure explained to patient. History of afib, CAD, RCA stent, HDL, MI,  HTN, multiple sclerosis, former smoker, systolic heart failure, peripheral  vascular disease, pacemaker/defibrillator, Vtach    Patient Status: Inpatient    Height: 69 inches Weight: 157 pounds BSA: 1.86 m^2 BMI: 23.18 kg/m^2    Rhythm: Within normal limits HR: 72 bpm    CONCLUSIONS    Summary  A limited echocardiogram was perform to assess pericardial effusion. Left ventricle is normal in size, normal left ventricular wall thickness,  global left ventricular systolic function is severely reduced with a  calculated EF 25.4%. Severe global hypokinesis. Right ventricular dilatation with reduced systolic function. Pacemaker / ICD  lead seen in right ventricle and right atrium. No pericardial effusion seen. Aortic root is mildly dilated measuring 4.0cm.     Signature  ----------------------------------------------------------------------------  Electronically signed by Zoe Baldwin(Sonographer) on 08/19/2022 11:24 AM  ----------------------------------------------------------------------------    ----------------------------------------------------------------------------  Electronically signed by Abdi Santo(Interpreting physician) on 2022  12:57 PM  ----------------------------------------------------------------------------  FINDINGS    Left Ventricle  Left ventricle is normal in size, normal left ventricular wall thickness,  global left ventricular systolic function is severely reduced with a  calculated EF 25.4%. Severe global hypokinesis. Right Atrium  Pacemaker / ICD lead seen in right atrium. Right Ventricle  Right ventricular dilatation with reduced systolic function. Pacemaker / ICD lead seen in right ventricle. Mitral Valve  The mitral valve opens well. Aortic Valve  Not well visualized but appears to open. Tricuspid Valve  The tricuspid valve opens well. Pulmonic Valve  Pulmonic valve not well visualized. Pericardial Effusion  No pericardial effusion seen. Pleural Effusion  No pleural effusion seen. Miscellaneous  Aortic root is mildly dilated measuring 4.0cm. M-mode / 2D Measurements & Calculations:    LVIDd:5.1 cm(3.7 - 5.6 cm)       Diastolic DGNDZQ:288 ml  FRUCH:5.3 cm(2.2 - 4.0 cm)       Systolic YWUGDX:908 ml  CGPV:5.3 cm(0.6 - 1.1 cm)        Aortic Root:4 cm(2.0 - 3.7 cm)  LVPWd:1 cm(0.6 - 1.1 cm)         LA Dimension: 4.1 cm(1.9 - 4.0 cm)  Fractional Shortenin.76 %  Calculated LVEF (%): 25.35 %    CARDIAC CATH 22  Findings:  LMCA: Normal 0% stenosis. LAD: Mild irregularities 20-30%. LCx: Mild irregularities 10-20%. RCA: Dominant  mid 100% stenosis  Lesion on Mid RCA: Mid subsection. 100% stenosis 20 mm length reduced to 10%. Pre procedure  DANE 0 flow was noted. Post Procedure DANE III flow was present. Poor runoff was present. The lesion  was diagnosed as High Risk (C). Devices used  l Cross It Wire 190cm. Number of passes: 1.  l Euphora Balloon 1.5mm x 12mm.  8 inflation(s)

## 2022-08-24 NOTE — PROGRESS NOTES
Physical Therapy  Facility/Department: [facility]- [facility]  Physical Therapy Daily Treatment Note    Name: Eloy Pagan  : 1945  MRN: [id number]  Date of Service: 2022    Discharge Recommendations:  Patient would benefit from continued therapy after discharge   PT Equipment Recommendations  Equipment Needed: No  Other: Pt has electric scooter at home and is his primary means of mobility. Patient Diagnosis(es): The primary encounter diagnosis was Fatigue, unspecified type. Diagnoses of Diarrhea, unspecified type, Renal insufficiency, and Elevated troponin were also pertinent to this visit. Past Medical History:  has a past medical history of Abdominal aortic aneurysm without rupture (HCC), Atrial flutter ([address] Chronic atrial fibrillation, unspecified ([address] Chronic kidney disease, Combined systolic and diastolic heart failure ([address] Diverticulosis large intestine w/o perforation or abscess w/bleeding, Hyperlipidemia, Hypotension, Ischemic cardiomyopathy, MI (myocardial infarction) ([address] MS (multiple sclerosis) ([address] Multiple sclerosis ([address] Pacemaker, artificial, and Urinary retention. Past Surgical History:  has a past surgical history that includes pacemaker placement and Coronary angioplasty with stent. Assessment   Body Structures, Functions, Activity Limitations Requiring Skilled Therapeutic Intervention: Decreased functional mobility ; Decreased strength;Decreased safe awareness;Decreased endurance;Decreased balance  Assessment: Pt performed bed mobility with CGA, and was able to sit EOB with CGA/SBA. Pt performed STS transfer in SS with modA x2, and additional transfers within SS with Willem x2. Pt is currently unsafe to return to prior living arrangement and would require 24/7 assistance. Pt would benefit from continued therapy to address deficits and maximize safety and independence with mobility. .  Therapy Prognosis: Good  Requires PT Follow-Up: Yes  Activity Tolerance  Activity Mobility Comments: Significant increased time/effort to perform bed mobility, HOB elevated. Transfers  Sit to Stand: Moderate Assistance;2 Person Assistance  Stand to sit: Moderate Assistance;2 Person Assistance  Comment: Pt performed multiple STS transfers in SS with modA x2 required to stand from bed into SS, then required Willem x2 to perform additional stands within SS from paddles. Pt required cueing for hand placement with difficulty holding onto SS bar d/t significant muscle tremors. Pt performed transfers x3 total and was able to stand ~45 seconds to 1 minute each time requiring cueing for improved posture. Pt tends to look down at floor while standing, requiring cueing to keep head up. Ambulation  Comments: Pt is nonambulatory. More Ambulation?: No  Stairs/Curb  Stairs?: No     Balance  Posture: Fair  Sitting - Static: Good  Sitting - Dynamic: Fair;+  Standing - Static: +;Poor  Standing - Dynamic: Poor;-  Comments: Pt able to sit EOB with CGA/SBA, standing balance assessed while standing in SS. Exercise Treatment:   Seated LE exercises: LAQ, seated marches: x10 reps BLE  Comments: Pt performed while seated EOB. AM-PAC Score  AM-PAC Inpatient Mobility Raw Score : 11 (08/24/22 0912)  AM-PAC Inpatient T-Scale Score : 33.86 (08/24/22 0912)  Mobility Inpatient CMS 0-100% Score: 72.57 (08/24/22 0912)  Mobility Inpatient CMS G-Code Modifier : CL (08/24/22 0912)      Goals  Short Term Goals  Time Frame for Short term goals: 14 visits  Short term goal 1: Pt to be Modified (I) with stand pivot transfers with modifications as needed without LOB. Short term goal 2: Pt to tolerate 30 minutes of therapy activity to improve strength and endurance for mobility. Short term goal 3: Improve standing static/dynamic balance to Fair+ during transfers to minimize fall risk with mobility.   Patient Goals   Patient goals : Return to SNF for further strengthening       Education  Patient Education  Education Given To: Patient  Education Provided: Plan of Care;Home Exercise Program;Role of Therapy;Transfer Training  Education Method: Verbal  Barriers to Learning: None  Education Outcome: Verbalized understanding      Therapy Time   Individual Concurrent Group Co-treatment   Time In 0843         Time Out 0910         Minutes 27         Timed Code Treatment Minutes: 23 Minutes (partial co-treat with OT)       Tami Rae, PTA

## 2022-08-24 NOTE — CARE COORDINATION
Spoke to [place] at [address] [facility]. She will convert patient to [address] [facility] once he gets to facility. Spoke to Carmen at [facility]. They are able to accept today. Transportation request faxed to [address] [place] [address] for asap. SUZAN completed. AVS faxed. Updated patient     patient will be picked up at 3pm. Notified Carmen at [facility]. Notified patient.  Notified patient's son, Pk Gave    Discharge [address] [facility]  Clinical Case Management Department  Written by: Lana Dalton RN    Patient Name: Ruthann Landers  Attending Provider: Ethan Francis DO  Admit Date: 2022 11:58 AM  MRN: [id number]  Account: [de-identified]                     : 1945  Discharge Date: 2022      Disposition: [facility]    Lana Dalton RN

## 2022-08-24 NOTE — DISCHARGE SUMMARY
[facility]  Office: [address], DO, Mikhail [place], DO, Del Camacho, DO, Yulia Sabjanki Blood, DO, Price Lopez MD, Brice Shannon MD, Shari Finnegan MD, Roberto Carlos Stanton MD,  Wero Hector MD, Omer Martinez MD, Lisa Sahu, DO, Leilani Gutierrez MD,  Juventino Gomes MD, Violet Navarrete MD, Memo Bustillos, DO, Malissa Hunter MD, Barber Calloway MD, Joleen Landrum MD, Jordyn Vivas, DO, Ana Yin MD, Paulette Schreiber MD, Jessica Maier, CNP,  Mihaela Reardon, CNP, Gin Salas, CNP, Joslyn Garvey, CNP, Jonathan Bryant PA-C, Susan Go, [place], Ramses Vega, CNP, Sarai Ramsey, CNP, Dasia Leonard, CNP, [employer], [facility], Elle Matthews, CNP, Zac Bruno, [facility], [place], [place], Quentin Gamble, CNP, Adalberto Sanchez, CNP, [place] [facility], [address] [place] [address]    Discharge Summary     Patient ID: Peter Rudolph  :  [id number]   MRN: [id number]     ACCOUNT:  [de-identified]   Patient's PCP: Tamika Salgado MD  Admit Date: 2022   Discharge Date: 2022  Length of Stay: 5  Code Status:  Full Code  Admitting Physician: Kayy Dempsey DO  Discharge Physician: Cheryle Kyle, PA-C     Active Discharge Diagnoses:     Hospital Problem Lists:  Principal Problem:    Weakness  Active Problems:    NSVT (nonsustained ventricular tachycardia) (HCC)    Dehydration    Combined systolic and diastolic cardiac dysfunction    Longstanding persistent atrial fibrillation (HCC)    Monomorphic ventricular tachycardia (HCC)    Fatigue    Stage 3b chronic kidney disease ([facility] [address]    Essential hypertension  Resolved Problems:    * No resolved hospital problems. *      Admission Condition:  poor     Discharged Condition: good    Hospital Stay:     Hospital Course:       This is a 80-year-old male with past medical history significant for multiple sclerosis, ischemic cardiomyopathy status post AICD placement, history of coronary artery disease status post PCI. Initially presented to outside hospital for weakness and fatigue. His AICD was interrogated and revealing ventricular tachycardia prompting his transfer to [address] [place] [address]. He has been evaluated by cardiology who is recommending cardiac catheterization. 8/23: Left heart catheterization     Findings:  LMCA: Normal 0% stenosis. LAD: Mild irregularities 20-30%. LCx: Mild irregularities 10-20%. RCA: Dominant  mid 100% stenosis  Lesion on Mid RCA: Mid subsection. 100% stenosis 20 mm length reduced to 10%. Pre procedure  DANE 0 flow was noted. Post Procedure DANE III flow was present. Poor runoff was present. The lesion  was diagnosed as High Risk (C). Devices used  l Cross It Wire 190cm. Number of passes: 1.  l Euphora Balloon 1.5mm x 12mm. 8 inflation(s) to a max pressure of: 14 jewels.  l Euphora Balloon 2.5mm x 30mm. 1 inflation(s) to a max pressure of: 19 jewels. l 6 fr GuideLiner. Number of passes: 1.  l Resolute [place] 4.0 x 38 NICK. 2 inflation(s) to a max pressure of: 24 jewels.  l NC Euphora Balloon 4.5mm x 12mm. 7 inflation(s) to a max pressure of: 29 jewels. Lesion on Dist RCA: Mid subsection. 100% stenosis 12 mm length reduced to 20%. Pre procedure  DANE 0 flow was noted. Post Procedure DANE III flow was present. Poor runoff was present. The lesion  was diagnosed as High Risk (C). Devices used  l Mini Trek Balloon 1.20mm x 12mm. 3 inflation(s) to a max pressure of: 14 jewels.  l Euphora Balloon 2.5mm x 30mm. 3 inflation(s) to a max pressure of: 12 jewels.  l Luge Wire 182 cm. Number of passes: 1.  l Resolute Vinay 3.0 x 26 NICK. 1 inflation(s) to a max pressure of: 12 jewels. Procedure Summary  Single significant vessel disease 100% in previous mid stent  Successful PTCA -NICK of the  mid and distal RCA  Lower normal LV function  Recommendations  Post stent protocol     Continue plavix, eliquis. Follow-up with cardiologist as outpatient. Significant therapeutic interventions:     #Ventricular tachycardia  -AICD interrogation revealing multiple runs of ventricular tachycardia. Underwent LHC yesterday, two stents placed. Report detailed above. Continue eliquis, plavix. Continue amiodarone 200 mg bid through 8/31, start amiodarone 200 mg daily thereafter. #Longstanding persistent atrial fibrillation  -resume Eliquis. Continue metoprolol and amiodarone as ordered. #Multiple sclerosis  -Stable     #Ischemic cardiomyopathy, mixed HFpEF/HFrEF  -Continue goal-directed medical therapy as ordered. Plavix, eliquis. #CKD stage IIIa  -Creatinine currently at baseline.      PT and OT assessment    Significant Diagnostic Studies:   Labs / Micro:  CBC:   Lab Results   Component Value Date/Time    WBC 8.2 08/23/2022 10:00 AM    RBC 3.67 08/23/2022 10:00 AM    HGB 10.2 08/23/2022 10:00 AM    HCT 32.3 08/23/2022 10:00 AM    MCV 88.0 08/23/2022 10:00 AM    MCH 27.8 08/23/2022 10:00 AM    MCHC 31.6 08/23/2022 10:00 AM    RDW 15.1 08/23/2022 10:00 AM     08/23/2022 10:00 AM     BMP:    Lab Results   Component Value Date/Time    GLUCOSE 100 08/24/2022 02:58 AM     08/24/2022 02:58 AM    K 4.0 08/24/2022 02:58 AM     08/24/2022 02:58 AM    CO2 22 08/24/2022 02:58 AM    ANIONGAP 12 08/24/2022 02:58 AM    BUN 26 08/24/2022 02:58 AM    CREATININE 1.50 08/24/2022 02:58 AM    BUNCRER NOT REPORTED 01/29/2016 06:38 PM    CALCIUM 8.3 08/24/2022 02:58 AM    LABGLOM 45 08/24/2022 02:58 AM    GFRAA 55 08/24/2022 02:58 AM    GFR      08/24/2022 02:58 AM     HFP:    Lab Results   Component Value Date/Time    PROT 5.7 08/22/2022 02:27 PM     CMP:    Lab Results   Component Value Date/Time    GLUCOSE 100 08/24/2022 02:58 AM     08/24/2022 02:58 AM    K 4.0 08/24/2022 02:58 AM     08/24/2022 02:58 AM    CO2 22 08/24/2022 02:58 AM    BUN 26 08/24/2022 02:58 AM    CREATININE 1.50 08/24/2022 02:58 AM    ANIONGAP 12 08/24/2022 02:58 AM    ALKPHOS 134 08/18/2022 01:10 PM    ALT 20 08/18/2022 01:10 PM    AST 19 08/18/2022 01:10 PM    BILITOT 0.68 08/18/2022 01:10 PM    LABALBU 3.4 08/18/2022 01:10 PM    ALBUMIN 0.9 08/18/2022 01:10 PM    LABGLOM 45 08/24/2022 02:58 AM    GFRAA 55 08/24/2022 02:58 AM    GFR      08/24/2022 02:58 AM    PROT 5.7 08/22/2022 02:27 PM    CALCIUM 8.3 08/24/2022 02:58 AM     PT/INR:    Lab Results   Component Value Date/Time    PROTIME 13.9 08/19/2022 05:21 AM    INR 1.4 08/19/2022 05:21 AM     PTT:   Lab Results   Component Value Date/Time    APTT 24.8 08/24/2022 08:29 AM     Radiology:  US RENAL COMPLETE    Result Date: 8/22/2022  1. Atrophic left kidney. 2. No hydronephrosis. 3. Increased echogenicity of the left renal cortex which can be seen with chronic medical renal disease. XR CHEST PORTABLE    Result Date: 8/18/2022  No acute process. Consultations:    Consults:     Final Specialist Recommendations/Findings:   IP CONSULT TO CARDIOLOGY  IP CONSULT TO NEPHROLOGY      The patient was seen and examined on day of discharge and this discharge summary is in conjunction with any daily progress note from day of discharge. Discharge plan:     Disposition: To a non-[facility] facility    Physician Follow Up: [facility]  [address] [place] [address]  [phone]  Follow up on 9/6/2022  at 2:30 pm with CNP     Diet: cardiac diet    Activity: As tolerated    Discharge Medications:      Medication List        CHANGE how you take these medications      lisinopril 2.5 MG tablet  Commonly known as: PRINIVIL;ZESTRIL  Take 1 tablet by mouth in the morning.   What changed: additional instructions            CONTINUE taking these medications      acetaminophen 325 MG tablet  Commonly known as: TYLENOL     albuterol sulfate  (90 Base) MCG/ACT inhaler  Commonly known as: PROVENTIL;VENTOLIN;PROAIR     apixaban 5 MG Tabs tablet  Commonly known as: ELIQUIS     furosemide 40 MG tablet  Commonly known as: LASIX  Take 1 tablet by mouth daily     metoprolol succinate 25 MG extended release tablet  Commonly known as: TOPROL XL  Take 0.5 tablets by mouth daily     nitroGLYCERIN 0.4 MG SL tablet  Commonly known as: NITROSTAT     omeprazole 20 MG delayed release capsule  Commonly known as: PRILOSEC     Rosuvastatin Calcium 20 MG Cpsp     tamsulosin 0.4 MG capsule  Commonly known as: FLOMAX  Take 1 capsule by mouth nightly            STOP taking these medications      atorvastatin 40 MG tablet  Commonly known as: LIPITOR     chlorhexidine 4 % external liquid  Commonly known as: HIBICLENS     ibuprofen 600 MG tablet  Commonly known as: IBU               Where to Get Your Medications        You can get these medications from any pharmacy    Bring a paper prescription for each of these medications  furosemide 40 MG tablet  metoprolol succinate 25 MG extended release tablet         No discharge procedures on file. Time Spent on discharge is  35 mins in patient examination, evaluation, counseling as well as medication reconciliation, prescriptions for required medications, discharge plan and follow up. Electronically signed by   Izabel Alfaro PA-C  8/24/2022  12:09 PM      Thank you Dr. Osiel Brice MD for the opportunity to be involved in this patient's care.

## 2022-09-19 ENCOUNTER — APPOINTMENT (OUTPATIENT)
Dept: GENERAL RADIOLOGY | Age: 77
DRG: 683 | End: 2022-09-19
Payer: OTHER GOVERNMENT

## 2022-09-19 ENCOUNTER — HOSPITAL ENCOUNTER (EMERGENCY)
Age: 77
Discharge: ANOTHER HOSPITAL | DRG: 683 | End: 2022-09-20
Attending: EMERGENCY MEDICINE
Payer: OTHER GOVERNMENT

## 2022-09-19 DIAGNOSIS — N17.9 ACUTE RENAL FAILURE, UNSPECIFIED ACUTE RENAL FAILURE TYPE (HCC): Primary | ICD-10-CM

## 2022-09-19 LAB
ABSOLUTE EOS #: 0 K/UL (ref 0–0.4)
ABSOLUTE LYMPH #: 1.1 K/UL (ref 1–4.8)
ABSOLUTE MONO #: 0.6 K/UL (ref 0.1–1.2)
ALBUMIN SERPL-MCNC: 3.7 G/DL (ref 3.5–5.2)
ALBUMIN/GLOBULIN RATIO: 1.3 (ref 1–2.5)
ALP BLD-CCNC: 91 U/L (ref 40–129)
ALT SERPL-CCNC: 14 U/L (ref 5–41)
ANION GAP SERPL CALCULATED.3IONS-SCNC: 18 MMOL/L (ref 9–17)
AST SERPL-CCNC: 9 U/L
BACTERIA: ABNORMAL
BASOPHILS # BLD: 1 % (ref 0–2)
BASOPHILS ABSOLUTE: 0 K/UL (ref 0–0.2)
BILIRUB SERPL-MCNC: 0.5 MG/DL (ref 0.3–1.2)
BILIRUBIN URINE: NEGATIVE
BUN BLDV-MCNC: 60 MG/DL (ref 8–23)
CALCIUM SERPL-MCNC: 9 MG/DL (ref 8.6–10.4)
CHLORIDE BLD-SCNC: 89 MMOL/L (ref 98–107)
CO2: 24 MMOL/L (ref 20–31)
COLOR: YELLOW
CREAT SERPL-MCNC: 5.16 MG/DL (ref 0.7–1.2)
EOSINOPHILS RELATIVE PERCENT: 1 % (ref 1–4)
EPITHELIAL CELLS UA: ABNORMAL /HPF (ref 0–5)
GFR AFRICAN AMERICAN: 13 ML/MIN
GFR NON-AFRICAN AMERICAN: 11 ML/MIN
GFR SERPL CREATININE-BSD FRML MDRD: ABNORMAL ML/MIN/{1.73_M2}
GLUCOSE BLD-MCNC: 130 MG/DL (ref 70–99)
GLUCOSE URINE: NEGATIVE
HCT VFR BLD CALC: 33.8 % (ref 41–53)
HEMOGLOBIN: 11.2 G/DL (ref 13.5–17.5)
KETONES, URINE: NEGATIVE
LEUKOCYTE ESTERASE, URINE: ABNORMAL
LYMPHOCYTES # BLD: 14 % (ref 24–44)
MAGNESIUM: 2.2 MG/DL (ref 1.6–2.6)
MCH RBC QN AUTO: 27.5 PG (ref 26–34)
MCHC RBC AUTO-ENTMCNC: 33 G/DL (ref 31–37)
MCV RBC AUTO: 83.4 FL (ref 80–100)
MONOCYTES # BLD: 8 % (ref 2–11)
NITRITE, URINE: NEGATIVE
OTHER OBSERVATIONS UA: ABNORMAL
PDW BLD-RTO: 16.7 % (ref 12.5–15.4)
PH UA: 6 (ref 5–8)
PLATELET # BLD: 253 K/UL (ref 140–450)
PMV BLD AUTO: 7.9 FL (ref 6–12)
POTASSIUM SERPL-SCNC: 3.3 MMOL/L (ref 3.7–5.3)
PRO-BNP: 3156 PG/ML
PROTEIN UA: ABNORMAL
RBC # BLD: 4.05 M/UL (ref 4.5–5.9)
RBC UA: ABNORMAL /HPF (ref 0–2)
SEG NEUTROPHILS: 76 % (ref 36–66)
SEGMENTED NEUTROPHILS ABSOLUTE COUNT: 5.7 K/UL (ref 1.8–7.7)
SODIUM BLD-SCNC: 131 MMOL/L (ref 135–144)
SPECIFIC GRAVITY UA: 1.01 (ref 1–1.03)
TOTAL PROTEIN: 6.6 G/DL (ref 6.4–8.3)
TROPONIN, HIGH SENSITIVITY: 137 NG/L (ref 0–22)
TURBIDITY: ABNORMAL
URINE HGB: ABNORMAL
UROBILINOGEN, URINE: NORMAL
WBC # BLD: 7.5 K/UL (ref 3.5–11)
WBC UA: ABNORMAL /HPF (ref 0–5)

## 2022-09-19 PROCEDURE — 2580000003 HC RX 258: Performed by: EMERGENCY MEDICINE

## 2022-09-19 PROCEDURE — 87186 SC STD MICRODIL/AGAR DIL: CPT

## 2022-09-19 PROCEDURE — 83880 ASSAY OF NATRIURETIC PEPTIDE: CPT

## 2022-09-19 PROCEDURE — 85025 COMPLETE CBC W/AUTO DIFF WBC: CPT

## 2022-09-19 PROCEDURE — 83735 ASSAY OF MAGNESIUM: CPT

## 2022-09-19 PROCEDURE — 74018 RADEX ABDOMEN 1 VIEW: CPT

## 2022-09-19 PROCEDURE — 87086 URINE CULTURE/COLONY COUNT: CPT

## 2022-09-19 PROCEDURE — 87077 CULTURE AEROBIC IDENTIFY: CPT

## 2022-09-19 PROCEDURE — 84484 ASSAY OF TROPONIN QUANT: CPT

## 2022-09-19 PROCEDURE — 2580000003 HC RX 258: Performed by: PHYSICIAN ASSISTANT

## 2022-09-19 PROCEDURE — 81001 URINALYSIS AUTO W/SCOPE: CPT

## 2022-09-19 PROCEDURE — 80053 COMPREHEN METABOLIC PANEL: CPT

## 2022-09-19 PROCEDURE — 36415 COLL VENOUS BLD VENIPUNCTURE: CPT

## 2022-09-19 PROCEDURE — 93005 ELECTROCARDIOGRAM TRACING: CPT | Performed by: PHYSICIAN ASSISTANT

## 2022-09-19 PROCEDURE — 80048 BASIC METABOLIC PNL TOTAL CA: CPT

## 2022-09-19 PROCEDURE — 71045 X-RAY EXAM CHEST 1 VIEW: CPT

## 2022-09-19 RX ORDER — SODIUM CHLORIDE 9 MG/ML
INJECTION, SOLUTION INTRAVENOUS CONTINUOUS
Status: DISCONTINUED | OUTPATIENT
Start: 2022-09-19 | End: 2022-09-20 | Stop reason: HOSPADM

## 2022-09-19 RX ORDER — 0.9 % SODIUM CHLORIDE 0.9 %
1000 INTRAVENOUS SOLUTION INTRAVENOUS ONCE
Status: COMPLETED | OUTPATIENT
Start: 2022-09-19 | End: 2022-09-20

## 2022-09-19 RX ADMIN — SODIUM CHLORIDE: 9 INJECTION, SOLUTION INTRAVENOUS at 21:11

## 2022-09-19 RX ADMIN — SODIUM CHLORIDE 1000 ML: 9 INJECTION, SOLUTION INTRAVENOUS at 23:00

## 2022-09-19 ASSESSMENT — PAIN - FUNCTIONAL ASSESSMENT: PAIN_FUNCTIONAL_ASSESSMENT: 0-10

## 2022-09-20 ENCOUNTER — APPOINTMENT (OUTPATIENT)
Dept: ULTRASOUND IMAGING | Age: 77
DRG: 683 | End: 2022-09-20
Attending: INTERNAL MEDICINE
Payer: OTHER GOVERNMENT

## 2022-09-20 ENCOUNTER — HOSPITAL ENCOUNTER (INPATIENT)
Age: 77
LOS: 2 days | Discharge: HOME | DRG: 683 | End: 2022-09-22
Attending: INTERNAL MEDICINE
Payer: OTHER GOVERNMENT

## 2022-09-20 VITALS
HEIGHT: 69 IN | TEMPERATURE: 97.9 F | SYSTOLIC BLOOD PRESSURE: 91 MMHG | RESPIRATION RATE: 16 BRPM | OXYGEN SATURATION: 98 % | HEART RATE: 74 BPM | DIASTOLIC BLOOD PRESSURE: 52 MMHG | BODY MASS INDEX: 22.92 KG/M2

## 2022-09-20 DIAGNOSIS — N17.9 AKI (ACUTE KIDNEY INJURY) (HCC): Primary | ICD-10-CM

## 2022-09-20 PROBLEM — G35 MULTIPLE SCLEROSIS (HCC): Status: ACTIVE | Noted: 2022-09-20

## 2022-09-20 PROBLEM — N18.9 ACUTE KIDNEY INJURY SUPERIMPOSED ON CKD (HCC): Status: ACTIVE | Noted: 2022-09-20

## 2022-09-20 LAB
ANION GAP SERPL CALCULATED.3IONS-SCNC: 10 MMOL/L (ref 9–17)
ANION GAP SERPL CALCULATED.3IONS-SCNC: 12 MMOL/L (ref 9–17)
ANION GAP SERPL CALCULATED.3IONS-SCNC: 21 MMOL/L (ref 9–17)
BUN BLDV-MCNC: 55 MG/DL (ref 8–23)
BUN BLDV-MCNC: 59 MG/DL (ref 8–23)
BUN BLDV-MCNC: 60 MG/DL (ref 8–23)
BUN/CREAT BLD: 13 (ref 9–20)
BUN/CREAT BLD: 13 (ref 9–20)
CALCIUM SERPL-MCNC: 8.3 MG/DL (ref 8.6–10.4)
CALCIUM SERPL-MCNC: 8.4 MG/DL (ref 8.6–10.4)
CALCIUM SERPL-MCNC: 8.6 MG/DL (ref 8.6–10.4)
CHLORIDE BLD-SCNC: 104 MMOL/L (ref 98–107)
CHLORIDE BLD-SCNC: 104 MMOL/L (ref 98–107)
CHLORIDE BLD-SCNC: 92 MMOL/L (ref 98–107)
CHLORIDE, UR: 52 MMOL/L
CO2: 19 MMOL/L (ref 20–31)
CO2: 24 MMOL/L (ref 20–31)
CO2: 25 MMOL/L (ref 20–31)
COMPLEMENT C3: 119 MG/DL (ref 90–180)
COMPLEMENT C4: 27 MG/DL (ref 10–40)
CORTISOL: 15.5 UG/DL (ref 2.7–18.4)
CREAT SERPL-MCNC: 4.4 MG/DL (ref 0.7–1.2)
CREAT SERPL-MCNC: 4.79 MG/DL (ref 0.7–1.2)
CREAT SERPL-MCNC: 5.41 MG/DL (ref 0.7–1.2)
CREATININE URINE: 66 MG/DL (ref 39–259)
CREATININE URINE: 67.7 MG/DL (ref 39–259)
CREATININE URINE: 67.7 MG/DL (ref 39–259)
EKG ATRIAL RATE: 77 BPM
EKG P AXIS: 59 DEGREES
EKG Q-T INTERVAL: 510 MS
EKG QRS DURATION: 196 MS
EKG QTC CALCULATION (BAZETT): 577 MS
EKG R AXIS: -119 DEGREES
EKG T AXIS: 45 DEGREES
EKG VENTRICULAR RATE: 77 BPM
EOSINOPHIL,URINE: NORMAL
GFR AFRICAN AMERICAN: 13 ML/MIN
GFR AFRICAN AMERICAN: 14 ML/MIN
GFR AFRICAN AMERICAN: 16 ML/MIN
GFR NON-AFRICAN AMERICAN: 10 ML/MIN
GFR NON-AFRICAN AMERICAN: 12 ML/MIN
GFR NON-AFRICAN AMERICAN: 13 ML/MIN
GFR SERPL CREATININE-BSD FRML MDRD: ABNORMAL ML/MIN/{1.73_M2}
GLUCOSE BLD-MCNC: 106 MG/DL (ref 70–99)
GLUCOSE BLD-MCNC: 119 MG/DL (ref 75–110)
GLUCOSE BLD-MCNC: 91 MG/DL (ref 70–99)
GLUCOSE BLD-MCNC: 94 MG/DL (ref 70–99)
HAV IGM SER IA-ACNC: NONREACTIVE
HEPATITIS B CORE IGM ANTIBODY: NONREACTIVE
HEPATITIS B SURFACE ANTIGEN: NONREACTIVE
HEPATITIS C ANTIBODY: NONREACTIVE
MAGNESIUM: 1.9 MG/DL (ref 1.6–2.6)
OSMOLALITY URINE: 313 MOSM/KG (ref 80–1300)
PHOSPHORUS: 3.7 MG/DL (ref 2.5–4.5)
POTASSIUM SERPL-SCNC: 3.2 MMOL/L (ref 3.7–5.3)
POTASSIUM SERPL-SCNC: 3.7 MMOL/L (ref 3.7–5.3)
POTASSIUM SERPL-SCNC: 3.8 MMOL/L (ref 3.7–5.3)
SODIUM BLD-SCNC: 132 MMOL/L (ref 135–144)
SODIUM BLD-SCNC: 139 MMOL/L (ref 135–144)
SODIUM BLD-SCNC: 140 MMOL/L (ref 135–144)
SODIUM,UR: 65 MMOL/L
TOTAL PROTEIN, URINE: 34 MG/DL
TOTAL PROTEIN, URINE: 34 MG/DL
TROPONIN, HIGH SENSITIVITY: 110 NG/L (ref 0–22)
TSH SERPL DL<=0.05 MIU/L-ACNC: 1.75 UIU/ML (ref 0.3–5)
URINE TOTAL PROTEIN CREATININE RATIO: 0.52 (ref 0–0.2)

## 2022-09-20 PROCEDURE — 87086 URINE CULTURE/COLONY COUNT: CPT

## 2022-09-20 PROCEDURE — 82533 TOTAL CORTISOL: CPT

## 2022-09-20 PROCEDURE — 82947 ASSAY GLUCOSE BLOOD QUANT: CPT

## 2022-09-20 PROCEDURE — 87077 CULTURE AEROBIC IDENTIFY: CPT

## 2022-09-20 PROCEDURE — 87186 SC STD MICRODIL/AGAR DIL: CPT

## 2022-09-20 PROCEDURE — 36415 COLL VENOUS BLD VENIPUNCTURE: CPT

## 2022-09-20 PROCEDURE — 86335 IMMUNFIX E-PHORSIS/URINE/CSF: CPT

## 2022-09-20 PROCEDURE — 6370000000 HC RX 637 (ALT 250 FOR IP): Performed by: INTERNAL MEDICINE

## 2022-09-20 PROCEDURE — 1200000000 HC SEMI PRIVATE

## 2022-09-20 PROCEDURE — 84100 ASSAY OF PHOSPHORUS: CPT

## 2022-09-20 PROCEDURE — 6360000002 HC RX W HCPCS: Performed by: INTERNAL MEDICINE

## 2022-09-20 PROCEDURE — 83735 ASSAY OF MAGNESIUM: CPT

## 2022-09-20 PROCEDURE — 86038 ANTINUCLEAR ANTIBODIES: CPT

## 2022-09-20 PROCEDURE — 84300 ASSAY OF URINE SODIUM: CPT

## 2022-09-20 PROCEDURE — 80048 BASIC METABOLIC PNL TOTAL CA: CPT

## 2022-09-20 PROCEDURE — 99222 1ST HOSP IP/OBS MODERATE 55: CPT | Performed by: INTERNAL MEDICINE

## 2022-09-20 PROCEDURE — 2580000003 HC RX 258: Performed by: EMERGENCY MEDICINE

## 2022-09-20 PROCEDURE — 82570 ASSAY OF URINE CREATININE: CPT

## 2022-09-20 PROCEDURE — 83935 ASSAY OF URINE OSMOLALITY: CPT

## 2022-09-20 PROCEDURE — 84156 ASSAY OF PROTEIN URINE: CPT

## 2022-09-20 PROCEDURE — 51702 INSERT TEMP BLADDER CATH: CPT

## 2022-09-20 PROCEDURE — 84166 PROTEIN E-PHORESIS/URINE/CSF: CPT

## 2022-09-20 PROCEDURE — 76775 US EXAM ABDO BACK WALL LIM: CPT

## 2022-09-20 PROCEDURE — 87205 SMEAR GRAM STAIN: CPT

## 2022-09-20 PROCEDURE — 86225 DNA ANTIBODY NATIVE: CPT

## 2022-09-20 PROCEDURE — 80074 ACUTE HEPATITIS PANEL: CPT

## 2022-09-20 PROCEDURE — 82436 ASSAY OF URINE CHLORIDE: CPT

## 2022-09-20 PROCEDURE — 6370000000 HC RX 637 (ALT 250 FOR IP): Performed by: NURSE PRACTITIONER

## 2022-09-20 PROCEDURE — 2580000003 HC RX 258: Performed by: INTERNAL MEDICINE

## 2022-09-20 PROCEDURE — 84443 ASSAY THYROID STIM HORMONE: CPT

## 2022-09-20 PROCEDURE — 2580000003 HC RX 258: Performed by: NURSE PRACTITIONER

## 2022-09-20 PROCEDURE — 6360000002 HC RX W HCPCS: Performed by: EMERGENCY MEDICINE

## 2022-09-20 PROCEDURE — 86160 COMPLEMENT ANTIGEN: CPT

## 2022-09-20 RX ORDER — ACETAMINOPHEN 325 MG/1
650 TABLET ORAL EVERY 6 HOURS PRN
Status: DISCONTINUED | OUTPATIENT
Start: 2022-09-20 | End: 2022-09-22 | Stop reason: HOSPADM

## 2022-09-20 RX ORDER — SODIUM CHLORIDE 9 MG/ML
INJECTION, SOLUTION INTRAVENOUS PRN
Status: CANCELLED | OUTPATIENT
Start: 2022-09-20

## 2022-09-20 RX ORDER — CLOPIDOGREL BISULFATE 75 MG/1
75 TABLET ORAL DAILY
Status: DISCONTINUED | OUTPATIENT
Start: 2022-09-20 | End: 2022-09-22 | Stop reason: HOSPADM

## 2022-09-20 RX ORDER — POLYETHYLENE GLYCOL 3350 17 G/17G
17 POWDER, FOR SOLUTION ORAL DAILY
Status: DISCONTINUED | OUTPATIENT
Start: 2022-09-20 | End: 2022-09-22 | Stop reason: HOSPADM

## 2022-09-20 RX ORDER — SODIUM CHLORIDE 9 MG/ML
INJECTION, SOLUTION INTRAVENOUS CONTINUOUS
Status: CANCELLED | OUTPATIENT
Start: 2022-09-20

## 2022-09-20 RX ORDER — PANTOPRAZOLE SODIUM 40 MG/1
40 TABLET, DELAYED RELEASE ORAL
Status: CANCELLED | OUTPATIENT
Start: 2022-09-20

## 2022-09-20 RX ORDER — SODIUM CHLORIDE 9 MG/ML
INJECTION, SOLUTION INTRAVENOUS CONTINUOUS
Status: DISCONTINUED | OUTPATIENT
Start: 2022-09-20 | End: 2022-09-22 | Stop reason: HOSPADM

## 2022-09-20 RX ORDER — SODIUM CHLORIDE 0.9 % (FLUSH) 0.9 %
5-40 SYRINGE (ML) INJECTION PRN
Status: CANCELLED | OUTPATIENT
Start: 2022-09-20

## 2022-09-20 RX ORDER — CLOPIDOGREL BISULFATE 75 MG/1
75 TABLET ORAL DAILY
Status: CANCELLED | OUTPATIENT
Start: 2022-09-20

## 2022-09-20 RX ORDER — METOPROLOL SUCCINATE 25 MG/1
50 TABLET, EXTENDED RELEASE ORAL DAILY
Status: CANCELLED | OUTPATIENT
Start: 2022-09-20

## 2022-09-20 RX ORDER — SODIUM CHLORIDE 9 MG/ML
INJECTION, SOLUTION INTRAVENOUS PRN
Status: DISCONTINUED | OUTPATIENT
Start: 2022-09-20 | End: 2022-09-22 | Stop reason: HOSPADM

## 2022-09-20 RX ORDER — AMIODARONE HYDROCHLORIDE 200 MG/1
200 TABLET ORAL DAILY
Status: CANCELLED | OUTPATIENT
Start: 2022-09-20

## 2022-09-20 RX ORDER — ROSUVASTATIN CALCIUM 10 MG/1
10 TABLET, COATED ORAL DAILY
Status: DISCONTINUED | OUTPATIENT
Start: 2022-09-21 | End: 2022-09-22 | Stop reason: HOSPADM

## 2022-09-20 RX ORDER — SODIUM CHLORIDE 0.9 % (FLUSH) 0.9 %
5-40 SYRINGE (ML) INJECTION EVERY 12 HOURS SCHEDULED
Status: DISCONTINUED | OUTPATIENT
Start: 2022-09-20 | End: 2022-09-22 | Stop reason: HOSPADM

## 2022-09-20 RX ORDER — ONDANSETRON 2 MG/ML
4 INJECTION INTRAMUSCULAR; INTRAVENOUS EVERY 6 HOURS PRN
Status: DISCONTINUED | OUTPATIENT
Start: 2022-09-20 | End: 2022-09-22 | Stop reason: HOSPADM

## 2022-09-20 RX ORDER — BISACODYL 10 MG
10 SUPPOSITORY, RECTAL RECTAL DAILY PRN
Status: DISCONTINUED | OUTPATIENT
Start: 2022-09-20 | End: 2022-09-21

## 2022-09-20 RX ORDER — PANTOPRAZOLE SODIUM 40 MG/1
40 TABLET, DELAYED RELEASE ORAL
Status: DISCONTINUED | OUTPATIENT
Start: 2022-09-20 | End: 2022-09-22 | Stop reason: HOSPADM

## 2022-09-20 RX ORDER — LISINOPRIL 5 MG/1
5 TABLET ORAL DAILY
Status: ON HOLD | COMMUNITY
End: 2022-09-22 | Stop reason: HOSPADM

## 2022-09-20 RX ORDER — ACETAMINOPHEN 650 MG/1
650 SUPPOSITORY RECTAL EVERY 6 HOURS PRN
Status: DISCONTINUED | OUTPATIENT
Start: 2022-09-20 | End: 2022-09-22 | Stop reason: HOSPADM

## 2022-09-20 RX ORDER — MAGNESIUM SULFATE 1 G/100ML
1000 INJECTION INTRAVENOUS ONCE
Status: COMPLETED | OUTPATIENT
Start: 2022-09-20 | End: 2022-09-20

## 2022-09-20 RX ORDER — ACETAMINOPHEN 650 MG/1
650 SUPPOSITORY RECTAL EVERY 6 HOURS PRN
Status: CANCELLED | OUTPATIENT
Start: 2022-09-20

## 2022-09-20 RX ORDER — METOPROLOL SUCCINATE 50 MG/1
50 TABLET, EXTENDED RELEASE ORAL DAILY
Status: DISCONTINUED | OUTPATIENT
Start: 2022-09-20 | End: 2022-09-22 | Stop reason: HOSPADM

## 2022-09-20 RX ORDER — 0.9 % SODIUM CHLORIDE 0.9 %
500 INTRAVENOUS SOLUTION INTRAVENOUS ONCE
Status: COMPLETED | OUTPATIENT
Start: 2022-09-20 | End: 2022-09-20

## 2022-09-20 RX ORDER — POTASSIUM CHLORIDE 20 MEQ/1
40 TABLET, EXTENDED RELEASE ORAL ONCE
Status: COMPLETED | OUTPATIENT
Start: 2022-09-20 | End: 2022-09-20

## 2022-09-20 RX ORDER — ACETAMINOPHEN 325 MG/1
650 TABLET ORAL EVERY 6 HOURS PRN
Status: CANCELLED | OUTPATIENT
Start: 2022-09-20

## 2022-09-20 RX ORDER — ONDANSETRON 4 MG/1
4 TABLET, ORALLY DISINTEGRATING ORAL EVERY 8 HOURS PRN
Status: CANCELLED | OUTPATIENT
Start: 2022-09-20

## 2022-09-20 RX ORDER — AMIODARONE HYDROCHLORIDE 200 MG/1
200 TABLET ORAL DAILY
Status: DISCONTINUED | OUTPATIENT
Start: 2022-09-20 | End: 2022-09-22 | Stop reason: HOSPADM

## 2022-09-20 RX ORDER — ROSUVASTATIN CALCIUM 10 MG/1
20 TABLET, COATED ORAL DAILY
Status: DISCONTINUED | OUTPATIENT
Start: 2022-09-20 | End: 2022-09-20

## 2022-09-20 RX ORDER — ONDANSETRON 2 MG/ML
4 INJECTION INTRAMUSCULAR; INTRAVENOUS EVERY 6 HOURS PRN
Status: CANCELLED | OUTPATIENT
Start: 2022-09-20

## 2022-09-20 RX ORDER — SODIUM CHLORIDE 0.9 % (FLUSH) 0.9 %
5-40 SYRINGE (ML) INJECTION EVERY 12 HOURS SCHEDULED
Status: CANCELLED | OUTPATIENT
Start: 2022-09-20

## 2022-09-20 RX ORDER — ONDANSETRON 4 MG/1
4 TABLET, ORALLY DISINTEGRATING ORAL EVERY 8 HOURS PRN
Status: DISCONTINUED | OUTPATIENT
Start: 2022-09-20 | End: 2022-09-22 | Stop reason: HOSPADM

## 2022-09-20 RX ORDER — SODIUM CHLORIDE 0.9 % (FLUSH) 0.9 %
5-40 SYRINGE (ML) INJECTION PRN
Status: DISCONTINUED | OUTPATIENT
Start: 2022-09-20 | End: 2022-09-22 | Stop reason: HOSPADM

## 2022-09-20 RX ADMIN — METOPROLOL SUCCINATE 50 MG: 50 TABLET, EXTENDED RELEASE ORAL at 09:33

## 2022-09-20 RX ADMIN — CEFTRIAXONE SODIUM 1000 MG: 1 INJECTION, POWDER, FOR SOLUTION INTRAMUSCULAR; INTRAVENOUS at 03:10

## 2022-09-20 RX ADMIN — POLYETHYLENE GLYCOL 3350 17 G: 17 POWDER, FOR SOLUTION ORAL at 18:37

## 2022-09-20 RX ADMIN — BISACODYL 10 MG: 10 SUPPOSITORY RECTAL at 14:22

## 2022-09-20 RX ADMIN — MAGNESIUM SULFATE 1000 MG: 1 INJECTION INTRAVENOUS at 13:46

## 2022-09-20 RX ADMIN — SODIUM CHLORIDE: 9 INJECTION, SOLUTION INTRAVENOUS at 13:44

## 2022-09-20 RX ADMIN — APIXABAN 5 MG: 5 TABLET, FILM COATED ORAL at 14:01

## 2022-09-20 RX ADMIN — APIXABAN 5 MG: 5 TABLET, FILM COATED ORAL at 21:15

## 2022-09-20 RX ADMIN — SODIUM CHLORIDE: 9 INJECTION, SOLUTION INTRAVENOUS at 06:36

## 2022-09-20 RX ADMIN — PANTOPRAZOLE SODIUM 40 MG: 40 TABLET, DELAYED RELEASE ORAL at 09:32

## 2022-09-20 RX ADMIN — CLOPIDOGREL BISULFATE 75 MG: 75 TABLET ORAL at 09:32

## 2022-09-20 RX ADMIN — ROSUVASTATIN CALCIUM 20 MG: 10 TABLET, FILM COATED ORAL at 09:32

## 2022-09-20 RX ADMIN — SODIUM CHLORIDE 500 ML: 9 INJECTION, SOLUTION INTRAVENOUS at 09:31

## 2022-09-20 RX ADMIN — POTASSIUM CHLORIDE 40 MEQ: 1500 TABLET, EXTENDED RELEASE ORAL at 13:47

## 2022-09-20 ASSESSMENT — PAIN SCALES - GENERAL: PAINLEVEL_OUTOF10: 0

## 2022-09-20 NOTE — ED NOTES
[facility] access notified RN that [facility] is currently on diversion. Dr. Kecia Tejeda notified.      .Electronically signed by Elizabeth Cordova RN on 9/19/2022 at 10:44 PM      Elizabeth Cordova RN  09/19/22 [id number]

## 2022-09-20 NOTE — CARE COORDINATION
[address] [place] [address] called [facility] ([phone]) discussed case they do not have any beds available but if pt still needs a bed in a few days they may have an opening. She will update [facility] notification [facility].

## 2022-09-20 NOTE — ED NOTES
[facility] access returned call and stated [facility] has no beds available tonight. They will check in the morning if we are still in need for a bed. Dr. Arthur Delacruz notified.     Electronically signed by Kamari Contreras RN on 9/19/2022 at 11:40 PM      Kamari Contreras RN  09/19/22 [phone]

## 2022-09-20 NOTE — ED NOTES
Dr Clifton Guerrero advised RN to call [facility] access to initiate transfer to [facility] per patients request.     .Electronically signed by Shaun Barron RN on 9/19/2022 at 11:28 PM      Shaun Barron RN  09/19/22 [phone]

## 2022-09-20 NOTE — CARE COORDINATION
Case Management Initial Discharge Plan  Peter Rudolph,         Readmission Risk              Risk of Unplanned Readmission:  29             Met with:patient to discuss discharge plans. Information verified: address, contacts, phone number, , insurance Yes  PCP: Tamika Salgado MD  Date of last visit: unsure goes to [facility] [place] about 1 yr ago     Insurance Provider: Medicare/VA-not listed in [place] his son is calling the [facility] Notification center     Discharge Planning  Current Residence:  1 story home alone   Living Arrangements:  KIKA Larkin has 1 stories/ramp thru garage   Support Systems:  Children  Current Services PTA:  [facility] Agency: Unsure? Patient able to perform ADL's:Independent  DME in home:  e scooter, walker, cpap (doesn't use), transfer bench   DME used to aid ambulation prior to admission:   e scooter   DME used during admission:  TBD     Potential Assistance Needed:  [address]: [facility] Medications:  No  Does patient want to participate in local refill/ meds to beds program?  Yes    Patient agreeable to home care: Yes  Freedom of choice provided:  yes      Type of Home Care Services:  PT, OT, Aide Services, Nursing Services  Patient expects to be discharged to:       Prior SNF/Rehab Placement and Facility: [facility] not want to go back there/[facility]   Agreeable to SNF/Rehab: unsure   [place] of choice provided: n/a   Evaluation: n/a    Expected Discharge date: Follow Up Appointment: Best Day/ Time:      Transportation provider: Wilber Barragan family friend [phone]  Transportation arrangements needed for discharge: No    Discharge Plan: UTI/SANNA. CR 4.7. Rocephin IV, Neph. Pt from home alone, out of [facility] snf for about 1 week. Hx MS, AICD, recent cardiac stents. LM with [facility] on hhc/needs. May benefit from ARU. Spoke with pt at bedside, he is a+o.  He was recently at [facility]  - -hx AICD, MS, Afib. Had cardiac cath 8/23 with 2 stents. Eliquis is a home med. Hx MS he is primarily scooter bound he can pivot self-but weak. 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 He does relay he does have difficulty urinating but does not self cath. He was discharged to [facility] in [place] was just released about 1 week ago. He was supposed to have hhc but he said has not started. He is  100% disabled has VA coverage but not in system-relays his son is calling [address]. He gets his meds thru [place] or pays OOP for them at [facility]. He has been working with  a Stephanie's [phone] at [facility] [place] in which she was assisting in hhc needs. Called . His dtr lives in [place], has a son in . His  friend Marcello Taylor helps assist with driving to appts, groceries.      Will need PT/OT evals-he has Medicare A/B-Probably into copay days may benefit from ARU with [facility] kindra.         Electronically signed by Linda Arevalo RN on 9/20/22 at 11:18 AM EDT

## 2022-09-20 NOTE — CARE COORDINATION
DC Planning    Spoke with Pat Silva from the [place] pt did have HHA services non skilled care approved for home 11 hr/week with [facility] Caregiver [phone]-called and LM. Jasmine Srinivasan from [facility] did call back they were going to start aide services this Thursday-did inform pt is hospitalized. Will need to inform of dc. He was at [facility] but under long term care thru [place] benefits-pt did leave and decided to go home-pt did not feel [facility] was adequate. He did not get set up with hhc/sn but if he does need SN [facility] Fret is in contract with [facility]. Sent referral.     His VA pcp did retire he needs another appt set up with VA pcp Dr. Judge Banegas establish SN/HHC.  Pat Silva with [facility] will set up appt probably virtual.

## 2022-09-20 NOTE — PROGRESS NOTES
Drug interaction - QTc concern    Dear Provider(s):  A drug interaction exists between amiodarone and ondansetron prn. The interaction is a potential additive QT prolongation. Please continue to monitor your patient's cardiac rhythm as you see appropriate. General risk factors for torsades de pointes  Hospitalization  Advanced Age   Female gender (2-fold risk)  Heart disease  Long Qt interval syndrome  QTc > 500 ms (2-3-fold risk)  Renal or hepatic insufficiency  Hypokalemia  Hypomagnesemia  Hypocalcemia  Diuretic use  Bradycardia  Use of more than one QT-prolonging drug  Rapid IV administration of select medicatrions      If pharmacy can be of any assistance to review the patients medications and offer alternative suggestions, please do not hesitate to contact the pharmacy at [phone]. Thank you.   Glen Yuen, [facility]  Pharmacist, 6:33 AM

## 2022-09-20 NOTE — H&P
[facility]  Office: [address], DO, Author Aron, DO, Cheryl Guillen, DO, Courtney Serrato, DO, Gina Sneed MD, Kam Max MD, Chelsea Cortés MD, Carolan Schilder, MD,  Kayla Mcdonough MD, Iraj Aviles MD, Pippa Kilpatrick, DO, Osbaldo Cruz MD,  Tiffany Stoll MD, Mercy Patricio MD, Manan Garcia DO, Lamont Marrero MD, Estiven Mortensen MD, Lacy Butts MD, Sharon Lozano MD, Gee Bueno MD, Yas Caruso MD, Samuel Walker DO, Yara Clancy MD, Mirian Powers MD, Manuel Silva, [facility],  Ivette Kulkarni, CNP, Shari Odom, CNP, Juan Carlos Elliott, CNP,  Curly Eduardo, [facility], Tobias Kapoor, CNP, Aubrey Gillespie, CNP, Alexia Mckeon, CNP, Jean-Pierre Glasgow, CNP, Keren Tomlinson, CNP, Sergei Burleson PA-C, Kashif Ac, CNS, Lysbeth Angelucci, [facility], Soila Hayes, CNP, Caryle Frame, CNP, Raji He, [facility]    HISTORY AND PHYSICAL EXAMINATION            Date:   9/20/2022  Patient name:  Columba Mcclelland  Date of admission:  9/20/2022  6:28 AM  MRN:   [id number]  Account:  [de-identified]  YOB: 1945  PCP:    Osmar Mcintyre MD  Room:   2002/2002-02  Code Status:    Full Code    Chief Complaint:     Low urine output    History Obtained From:     patient    History of Present Illness:     Columba Mcclelland is a 68 y.o. male with a past medical history for multiple sclerosis, urinary retention, CKD stage IIIa presents to the emergency department at [facility] for low urine output. Patient in the past has required urinary catheterization and thought that he may need this again. Patient's creatinine was found to be 5.2, patient was recently seen for ventricular tachycardia and was discharged on diuretics. Patient was transferred to [facility] for nephrology evaluation for dialysis. Patient previously treated for multiple sclerosis with IVIG until he developed anaphylaxis. Patient with no other complaints currently, await nephrology consult    Past Medical History:     Past Medical History:   Diagnosis Date    Abdominal aortic aneurysm without rupture (HCC)     Atrial flutter (HCC)     Chronic atrial fibrillation, unspecified (HCC)     Chronic kidney disease     Combined systolic and diastolic heart failure (HCC)     Diverticulosis large intestine w/o perforation or abscess w/bleeding     Hyperlipidemia     Hypotension     Ischemic cardiomyopathy     MI (myocardial infarction) ([facility] [address]     MS (multiple sclerosis) ([facility]     Multiple sclerosis ([facility]     Pacemaker, artificial     Urinary retention         Past Surgical History:     Past Surgical History:   Procedure Laterality Date    CORONARY ANGIOPLASTY WITH STENT PLACEMENT      PACEMAKER PLACEMENT          Medications Prior to Admission:     Prior to Admission medications    Medication Sig Start Date End Date Taking?  Authorizing Provider   metoprolol succinate (TOPROL XL) 50 MG extended release tablet Take 1 tablet by mouth daily 8/25/22   [facility], [facility]   amiodarone (CORDARONE) 200 MG tablet Take 1 tablet by mouth 2 times daily for 8 days 8/24/22 9/1/22  [facility], [facility]   amiodarone (CORDARONE) 200 MG tablet Take 1 tablet by mouth daily 9/1/22   [employer], [facility]   clopidogrel (PLAVIX) 75 MG tablet Take 1 tablet by mouth daily 8/25/22   [facility], [facility]   torsemide [facility]) 20 MG tablet Take 1 tablet by mouth daily 8/25/22   [facility], [facility]   furosemide (LASIX) 40 MG tablet Take 1 tablet by mouth daily 8/22/22 8/24/22  Drake Solorzano DO   tamsulosin [facility]) 0.4 MG capsule Take 1 capsule by mouth nightly 7/30/22   Brigette Ceron MD   lisinopril (PRINIVIL;ZESTRIL) 2.5 MG tablet Take 1 tablet by mouth in the morning. 7/23/22   Drake Solorzano DO   apixaban (ELIQUIS) 5 MG TABS tablet Take by mouth 2 times daily    Historical Provider, MD   albuterol sulfate HFA (PROVENTIL;VENTOLIN;PROAIR) 108 (90 Base) MCG/ACT inhaler Inhale 2 puffs into the lungs every 6 hours as needed for Wheezing    Historical Provider, MD   Rosuvastatin Calcium 20 MG CPSP Take by mouth Daily    Historical Provider, MD   nitroGLYCERIN (NITROSTAT) 0.4 MG SL tablet Place 0.4 mg under the tongue every 5 minutes as needed for Chest pain up to max of 3 total doses. If no relief after 1 dose, call 911. Historical Provider, MD   acetaminophen (TYLENOL) 325 MG tablet Take 650 mg by mouth every 6 hours as needed for Pain    Historical Provider, MD   omeprazole (PRILOSEC) 20 MG delayed release capsule Take 20 mg by mouth Daily    Historical Provider, MD   ibuprofen (IBU) 600 MG tablet Take 1 tablet by mouth every 6 hours as needed for Pain 6/25/22 7/30/22  Johnson Faria MD        Allergies:     Codeine, Doxycycline, Erythromycin, Gammagard [immune globulin], and Sulfa antibiotics    Social History:     Tobacco:    reports that he has quit smoking. He has never used smokeless tobacco.  Alcohol:      reports no history of alcohol use. Drug Use:  reports no history of drug use. Family History:     No family history on file. Review of Systems:     Positive and Negative as described in HPI.     CONSTITUTIONAL:  negative for fevers, chills, sweats, fatigue, weight loss  HEENT:  negative for vision, hearing changes, runny nose, throat pain  RESPIRATORY:  negative for shortness of breath, cough, congestion, wheezing  CARDIOVASCULAR:  negative for chest pain, palpitations  GASTROINTESTINAL:  negative for nausea, vomiting, diarrhea, constipation, change in bowel habits, abdominal pain   GENITOURINARY:  negative for difficulty of urination, burning with urination, frequency   INTEGUMENT:  negative for rash, skin lesions, easy bruising   HEMATOLOGIC/LYMPHATIC:  negative for swelling/edema   ALLERGIC/IMMUNOLOGIC:  negative for urticaria , itching  ENDOCRINE:  negative increase in drinking, increase in urination, hot or cold intolerance  MUSCULOSKELETAL:  negative joint pains, muscle aches, swelling of joints  NEUROLOGICAL: +tremor negative for headaches, dizziness, lightheadedness, numbness, pain, tingling extremities  BEHAVIOR/PSYCH:  negative for depression, anxiety    Physical Exam:   BP (!) 80/38   Pulse 60   Temp 97.5 °F (36.4 °C) (Oral)   Resp 16   Ht 5' 9\" (1.753 m)   Wt 147 lb 1.6 oz (66.7 kg)   SpO2 97%   BMI 21.72 kg/m²   Temp (24hrs), Av.8 °F (36.6 °C), Min:97.5 °F (36.4 °C), Max:98.2 °F (36.8 °C)    No results for input(s): POCGLU in the last 72 hours. No intake or output data in the 24 hours ending 22 1253    General Appearance:  alert, well appearing, and in no acute distress  Mental status: oriented to person, place, and time  Head:  normocephalic, atraumatic  Eye: no icterus, redness, pupils equal and reactive, extraocular eye movements intact, conjunctiva clear  Ear: normal external ear, no discharge, hearing intact  Nose:  no drainage noted  Mouth: mucous membranes moist  Neck: supple, no carotid bruits, thyroid not palpable  Lungs: Bilateral equal air entry, clear to auscultation, no wheezing, rales or rhonchi, normal effort  Cardiovascular: normal rate, regular rhythm, no murmur, gallop, rub.   Abdomen: Soft, nontender, nondistended, normal bowel sounds, no hepatomegaly or splenomegaly  Neurologic: There are no new focal motor or sensory deficits, normal muscle tone and bulk, no abnormal sensation, normal speech, cranial nerves II through XII grossly intact  Skin: No gross lesions, rashes, bruising or bleeding on exposed skin area  Extremities:  peripheral pulses palpable, no pedal edema or calf pain with palpation  Psych: normal affect     Investigations:      Laboratory Testing:  Recent Results (from the past 24 hour(s))   EKG 12 Lead    Collection Time: 22  8:29 PM   Result Value Ref Range    Ventricular Rate 77 BPM    Atrial Rate 77 BPM    QRS Duration 196 ms    Q-T Interval 510 ms    QTc Calculation (Cristy) 577 ms    P Axis 59 degrees    R Axis -119 degrees    T Axis 45 degrees   CBC with Auto Differential    Collection Time: 09/19/22  9:08 PM   Result Value Ref Range    WBC 7.5 3.5 - 11.0 k/uL    RBC 4.05 (L) 4.5 - 5.9 m/uL    Hemoglobin 11.2 (L) 13.5 - 17.5 g/dL    Hematocrit 33.8 (L) 41 - 53 %    MCV 83.4 80 - 100 fL    MCH 27.5 26 - 34 pg    MCHC 33.0 31 - 37 g/dL    RDW 16.7 (H) 12.5 - 15.4 %    Platelets 298 200 - 863 k/uL    MPV 7.9 6.0 - 12.0 fL    Seg Neutrophils 76 (H) 36 - 66 %    Lymphocytes 14 (L) 24 - 44 %    Monocytes 8 2 - 11 %    Eosinophils % 1 1 - 4 %    Basophils 1 0 - 2 %    Segs Absolute 5.70 1.8 - 7.7 k/uL    Absolute Lymph # 1.10 1.0 - 4.8 k/uL    Absolute Mono # 0.60 0.1 - 1.2 k/uL    Absolute Eos # 0.00 0.0 - 0.4 k/uL    Basophils Absolute 0.00 0.0 - 0.2 k/uL   Comprehensive Metabolic Panel w/ Reflex to MG    Collection Time: 09/19/22  9:08 PM   Result Value Ref Range    Glucose 130 (H) 70 - 99 mg/dL    BUN 60 (H) 8 - 23 mg/dL    Creatinine 5.16 (HH) 0.70 - 1.20 mg/dL    Calcium 9.0 8.6 - 10.4 mg/dL    Sodium 131 (L) 135 - 144 mmol/L    Potassium 3.3 (L) 3.7 - 5.3 mmol/L    Chloride 89 (L) 98 - 107 mmol/L    CO2 24 20 - 31 mmol/L    Anion Gap 18 (H) 9 - 17 mmol/L    Alkaline Phosphatase 91 40 - 129 U/L    ALT 14 5 - 41 U/L    AST 9 <40 U/L    Total Bilirubin 0.5 0.3 - 1.2 mg/dL    Total Protein 6.6 6.4 - 8.3 g/dL    Albumin 3.7 3.5 - 5.2 g/dL    Albumin/Globulin Ratio 1.3 1.0 - 2.5    GFR Non-African American 11 (L) >60 mL/min    GFR  13 (L) >60 mL/min    GFR Comment         Troponin    Collection Time: 09/19/22  9:08 PM   Result Value Ref Range    Troponin, High Sensitivity 137 (HH) 0 - 22 ng/L   Brain Natriuretic Peptide    Collection Time: 09/19/22  9:08 PM   Result Value Ref Range    Pro-BNP 3,156 (H) <300 pg/mL   Magnesium    Collection Time: 09/19/22  9:08 PM   Result Value Ref Range    Magnesium 2.2 1.6 - 2.6 mg/dL   Urinalysis with Reflex to Culture Collection Time: 09/19/22 11:17 PM    Specimen: Urine   Result Value Ref Range    Color, UA Yellow Yellow    Turbidity UA SLIGHTLY CLOUDY (A) Clear    Glucose, Ur NEGATIVE NEGATIVE    Bilirubin Urine NEGATIVE NEGATIVE    Ketones, Urine NEGATIVE NEGATIVE    Specific Gravity, UA 1.010 1.005 - 1.030    Urine Hgb MODERATE (A) NEGATIVE    pH, UA 6.0 5.0 - 8.0    Protein, UA TRACE (A) NEGATIVE    Urobilinogen, Urine Normal Normal    Nitrite, Urine NEGATIVE NEGATIVE    Leukocyte Esterase, Urine LARGE (A) NEGATIVE   Microscopic Urinalysis    Collection Time: 09/19/22 11:17 PM   Result Value Ref Range    WBC, UA TOO NUMEROUS TO COUNT 0 - 5 /HPF    RBC, UA 20 TO 50 0 - 2 /HPF    Epithelial Cells UA 2 TO 5 0 - 5 /HPF    Bacteria, UA MANY (A) None    Other Observations UA Culture ordered based on defined criteria. (A) NOT REQ.    Basic Metabolic Panel    Collection Time: 09/19/22 11:52 PM   Result Value Ref Range    Glucose 91 70 - 99 mg/dL    BUN 59 (H) 8 - 23 mg/dL    Creatinine 5.41 (HH) 0.70 - 1.20 mg/dL    Calcium 8.4 (L) 8.6 - 10.4 mg/dL    Sodium 132 (L) 135 - 144 mmol/L    Potassium 3.8 3.7 - 5.3 mmol/L    Chloride 92 (L) 98 - 107 mmol/L    CO2 19 (L) 20 - 31 mmol/L    Anion Gap 21 (H) 9 - 17 mmol/L    GFR Non-African American 10 (L) >60 mL/min    GFR  13 (L) >60 mL/min    GFR Comment         Troponin    Collection Time: 09/19/22 11:52 PM   Result Value Ref Range    Troponin, High Sensitivity 110 (HH) 0 - 22 ng/L   Basic Metabolic Panel    Collection Time: 09/20/22  7:02 AM   Result Value Ref Range    Glucose 94 70 - 99 mg/dL    BUN 60 (H) 8 - 23 mg/dL    Creatinine 4.79 (H) 0.70 - 1.20 mg/dL    Bun/Cre Ratio 13 9 - 20    Calcium 8.3 (L) 8.6 - 10.4 mg/dL    Sodium 140 135 - 144 mmol/L    Potassium 3.2 (L) 3.7 - 5.3 mmol/L    Chloride 104 98 - 107 mmol/L    CO2 24 20 - 31 mmol/L    Anion Gap 12 9 - 17 mmol/L    GFR Non-African American 12 (L) >60 mL/min    GFR  14 (L) >60 mL/min    GFR Comment         Magnesium    Collection Time: 09/20/22  7:02 AM   Result Value Ref Range    Magnesium 1.9 1.6 - 2.6 mg/dL   Phosphorus    Collection Time: 09/20/22  7:02 AM   Result Value Ref Range    Phosphorus 3.7 2.5 - 4.5 mg/dL       Imaging/Diagnostics:  XR ABDOMEN (KUB) (SINGLE AP VIEW)    Result Date: 9/19/2022  Nonobstructive bowel gas pattern with moderate volume stool. XR CHEST PORTABLE    Result Date: 9/19/2022  No acute process. Assessment :      Hospital Problems             Last Modified POA    * (Principal) Acute kidney injury superimposed on CKD ([address] 9/20/2022 Yes    Coronary artery disease involving native coronary artery of native heart 9/20/2022 Yes    Ischemic cardiomyopathy 9/20/2022 Yes    Stage 3a chronic kidney disease ([address] 9/20/2022 Yes    Chronic atrial fibrillation ([address] 9/20/2022 Yes    Essential hypertension 9/20/2022 Yes    SANNA (acute kidney injury) ([address] 9/20/2022 Yes    Multiple sclerosis ([address] 9/20/2022 Yes       Plan:     Patient status inpatient in the Progressive Unit/Step down    Continue IV fluids, creatinine is improving  Nephrology consulted for dialysis needs, likely will discharge on lower dose of diuretics as needed if at all. No other complaints, reevaluate tomorrow    Consultations:   IP CONSULT TO NEPHROLOGY     Patient is admitted as inpatient status because of co-morbidities listed above, severity of signs and symptoms as outlined, requirement for current medical therapies and most importantly because of direct risk to patient if care not provided in a hospital setting. Expected length of stay > 48 hours.     Jake Toth DO  9/20/2022  12:53 PM    Copy sent to Dr. Estrellita Ramos MD

## 2022-09-20 NOTE — ED PROVIDER NOTES
[id number] [facility]  [address] [facility] [address]  Phone: [phone]  Fax: [phone]      Attending Physician Attestation    I performed a history and physical examination of the patient and discussed management with the mid level provideer. I reviewed the mid level provider's note and agree with the documented findings and plan of care. Any areas of disagreement are noted on the chart. I was personally present for the key portions of any procedures. I have documented in the chart those procedures where I was not present during the key portions. I have reviewed the emergency nurses triage note. I agree with the chief complaint, past medical history, past surgical history, allergies, medications, social and family history as documented unless otherwise noted below. Documentation of the HPI, Physical Exam and Medical Decision Making performed by mid level providers is based on my personal performance of the HPI, PE and MDM. For Physician Assistant/ Nurse Practitioner cases/documentation I have personally evaluated this patient and have completed at least one if not all key elements of the E/M (history, physical exam, and MDM). Additional findings are as noted. CHIEF COMPLAINT       Chief Complaint   Patient presents with    Other     Difficulty urinating     Hypotension     Has not had midodrine since [address]    has a past medical history of Abdominal aortic aneurysm without rupture ([address] Atrial flutter ([address] Chronic atrial fibrillation, unspecified ([address] Chronic kidney disease, Combined systolic and diastolic heart failure ([address] Diverticulosis large intestine w/o perforation or abscess w/bleeding, Hyperlipidemia, Hypotension, Ischemic cardiomyopathy, MI (myocardial infarction) ([address] MS (multiple sclerosis) ([address] Multiple sclerosis ([address] Pacemaker, artificial, and Urinary retention.     SURGICAL HISTORY      has a past surgical history that includes pacemaker placement and Coronary angioplasty with stent. CURRENT MEDICATIONS       Current Discharge Medication List        CONTINUE these medications which have NOT CHANGED    Details   metoprolol succinate (TOPROL XL) 50 MG extended release tablet Take 1 tablet by mouth daily  Qty: 30 tablet, Refills: 3      amiodarone (CORDARONE) 200 MG tablet Take 1 tablet by mouth daily  Qty: 30 tablet, Refills: 0      clopidogrel (PLAVIX) 75 MG tablet Take 1 tablet by mouth daily  Qty: 30 tablet, Refills: 3      torsemide (DEMADEX) 20 MG tablet Take 1 tablet by mouth daily  Qty: 30 tablet, Refills: 3      tamsulosin (FLOMAX) 0.4 MG capsule Take 1 capsule by mouth nightly  Qty: 30 capsule, Refills: 3      lisinopril (PRINIVIL;ZESTRIL) 2.5 MG tablet Take 1 tablet by mouth in the morning. Qty: 30 tablet, Refills: 3      apixaban (ELIQUIS) 5 MG TABS tablet Take by mouth 2 times daily      albuterol sulfate HFA (PROVENTIL;VENTOLIN;PROAIR) 108 (90 Base) MCG/ACT inhaler Inhale 2 puffs into the lungs every 6 hours as needed for Wheezing      Rosuvastatin Calcium 20 MG CPSP Take by mouth Daily      nitroGLYCERIN (NITROSTAT) 0.4 MG SL tablet Place 0.4 mg under the tongue every 5 minutes as needed for Chest pain up to max of 3 total doses. If no relief after 1 dose, call 911.      acetaminophen (TYLENOL) 325 MG tablet Take 650 mg by mouth every 6 hours as needed for Pain      omeprazole (PRILOSEC) 20 MG delayed release capsule Take 20 mg by mouth Daily             ALLERGIES     is allergic to codeine, doxycycline, erythromycin, gammagard [immune globulin], and sulfa antibiotics. FAMILY HISTORY     has no family status information on file. family history is not on file. SOCIAL HISTORY      reports that he has quit smoking. He has never used smokeless tobacco. He reports that he does not drink alcohol and does not use drugs.       DIAGNOSTIC RESULTS     EKG: All EKG's are interpreted by the Emergency Department Physician who either signs or Co-signs this chart in the absence of a cardiologist.  Interpreted by Aubrie Kelley MD     Rhythm: Paced rhythm  Rate: 77  Axis: Indeterminate  Ectopy: Paced rhythm  Conduction: Paced rhythm  ST Segments: No obvious elevation or depression of the ST segments  T Waves: No obvious acute findings    Clinical Impression: Nonspecific ECG. Sinus rhythm. No acute infarction/ischemia noted. RADIOLOGY:   Non-plain film images such as CT, Ultrasound and MRI are read by the radiologist. Plain radiographic images are visualized and the radiologist interpretations are reviewed as follows:     XR CHEST PORTABLE   Final Result   No acute process. XR ABDOMEN (KUB) (SINGLE AP VIEW)   Final Result   Nonobstructive bowel gas pattern with moderate volume stool.              LABS:  Results for orders placed or performed during the hospital encounter of 09/19/22   CBC with Auto Differential   Result Value Ref Range    WBC 7.5 3.5 - 11.0 k/uL    RBC 4.05 (L) 4.5 - 5.9 m/uL    Hemoglobin 11.2 (L) 13.5 - 17.5 g/dL    Hematocrit 33.8 (L) 41 - 53 %    MCV 83.4 80 - 100 fL    MCH 27.5 26 - 34 pg    MCHC 33.0 31 - 37 g/dL    RDW 16.7 (H) 12.5 - 15.4 %    Platelets 778 791 - 366 k/uL    MPV 7.9 6.0 - 12.0 fL    Seg Neutrophils 76 (H) 36 - 66 %    Lymphocytes 14 (L) 24 - 44 %    Monocytes 8 2 - 11 %    Eosinophils % 1 1 - 4 %    Basophils 1 0 - 2 %    Segs Absolute 5.70 1.8 - 7.7 k/uL    Absolute Lymph # 1.10 1.0 - 4.8 k/uL    Absolute Mono # 0.60 0.1 - 1.2 k/uL    Absolute Eos # 0.00 0.0 - 0.4 k/uL    Basophils Absolute 0.00 0.0 - 0.2 k/uL   Comprehensive Metabolic Panel w/ Reflex to MG   Result Value Ref Range    Glucose 130 (H) 70 - 99 mg/dL    BUN 60 (H) 8 - 23 mg/dL    Creatinine 5.16 (HH) 0.70 - 1.20 mg/dL    Calcium 9.0 8.6 - 10.4 mg/dL    Sodium 131 (L) 135 - 144 mmol/L    Potassium 3.3 (L) 3.7 - 5.3 mmol/L    Chloride 89 (L) 98 - 107 mmol/L    CO2 24 20 - 31 mmol/L    Anion Gap 18 (H) 9 - 17 mmol/L Alkaline Phosphatase 91 40 - 129 U/L    ALT 14 5 - 41 U/L    AST 9 <40 U/L    Total Bilirubin 0.5 0.3 - 1.2 mg/dL    Total Protein 6.6 6.4 - 8.3 g/dL    Albumin 3.7 3.5 - 5.2 g/dL    Albumin/Globulin Ratio 1.3 1.0 - 2.5    GFR Non-African American 11 (L) >60 mL/min    GFR  13 (L) >60 mL/min    GFR Comment         Troponin   Result Value Ref Range    Troponin, High Sensitivity 137 (HH) 0 - 22 ng/L   Brain Natriuretic Peptide   Result Value Ref Range    Pro-BNP 3,156 (H) <300 pg/mL   Urinalysis with Reflex to Culture    Specimen: Urine   Result Value Ref Range    Color, UA Yellow Yellow    Turbidity UA SLIGHTLY CLOUDY (A) Clear    Glucose, Ur NEGATIVE NEGATIVE    Bilirubin Urine NEGATIVE NEGATIVE    Ketones, Urine NEGATIVE NEGATIVE    Specific Gravity, UA 1.010 1.005 - 1.030    Urine Hgb MODERATE (A) NEGATIVE    pH, UA 6.0 5.0 - 8.0    Protein, UA TRACE (A) NEGATIVE    Urobilinogen, Urine Normal Normal    Nitrite, Urine NEGATIVE NEGATIVE    Leukocyte Esterase, Urine LARGE (A) NEGATIVE   Magnesium   Result Value Ref Range    Magnesium 2.2 1.6 - 2.6 mg/dL   Basic Metabolic Panel   Result Value Ref Range    Glucose 91 70 - 99 mg/dL    BUN 59 (H) 8 - 23 mg/dL    Creatinine 5.41 (HH) 0.70 - 1.20 mg/dL    Calcium 8.4 (L) 8.6 - 10.4 mg/dL    Sodium 132 (L) 135 - 144 mmol/L    Potassium 3.8 3.7 - 5.3 mmol/L    Chloride 92 (L) 98 - 107 mmol/L    CO2 19 (L) 20 - 31 mmol/L    Anion Gap 21 (H) 9 - 17 mmol/L    GFR Non-African American 10 (L) >60 mL/min    GFR  13 (L) >60 mL/min    GFR Comment         Microscopic Urinalysis   Result Value Ref Range    WBC, UA TOO NUMEROUS TO COUNT 0 - 5 /HPF    RBC, UA 20 TO 50 0 - 2 /HPF    Epithelial Cells UA 2 TO 5 0 - 5 /HPF    Bacteria, UA MANY (A) None    Other Observations UA Culture ordered based on defined criteria. (A) NOT REQ.    Troponin   Result Value Ref Range    Troponin, High Sensitivity 110 (HH) 0 - 22 ng/L   EKG 12 Lead   Result Value Ref Range Ventricular Rate 77 BPM    Atrial Rate 77 BPM    QRS Duration 196 ms    Q-T Interval 510 ms    QTc Calculation (Bazett) 577 ms    P Axis 59 degrees    R Axis -119 degrees    T Axis 45 degrees         EMERGENCY DEPARTMENT COURSE:   Vitals:    Vitals:    09/19/22 2115 09/19/22 2130 09/19/22 2145 09/19/22 2200   BP: (!) 83/44 (!) 118/44 (!) 94/50 (!) 99/48   Pulse:       Resp:       Temp:       TempSrc:       SpO2:       Height:         -------------------------  BP: (!) 99/48, Temp: 98.2 °F (36.8 °C), Heart Rate: 88, Resp: 16      PERTINENT ATTENDING PHYSICIAN COMMENTS:    Patient presents with decreased urine output. Found to be in acute renal failure. I spoke with Dr. Dolores Pappas on-call for nephrology and he is comfortable with the patient going to either [facility] or [facility]. I spoke with the hospitalist at [facility] where they have available beds and she accepted transfer of the patient. Patient updated and agreeable with the plan. CONSULTS:    None    CRITICAL CARE:     None    PROCEDURES:    None    FINAL IMPRESSION      1. Acute renal failure, unspecified acute renal failure type ([facility]          DISPOSITION/PLAN   DISPOSITION Admitted 09/20/2022 02:01:19 AM      Condition on Disposition    Improved    PATIENT REFERRED TO:  No follow-up provider specified.     DISCHARGE MEDICATIONS:  Current Discharge Medication List          (Please note that portions of this note were completed with a voice recognition program.  Efforts were made to edit the dictations but occasionally words are mis-transcribed.)    Bryson Mann DO, DO  Attending Emergency Physician       Bryson Mann DO  09/20/22 3492

## 2022-09-20 NOTE — PROGRESS NOTES
Pt admitted to room 2002 from [facility] ER  Oriented to room and call light/tv controls. Bed in lowest position, wheels locked, 2/4 side rails up  Call light in reach, room free of clutter, adequate lighting provided. Admission database, vital signs and assessment as charted.

## 2022-09-20 NOTE — PROGRESS NOTES
Transitions of Care Pharmacy Service   Medication Review    The patient's list of current home medications has been reviewed. Source(s) of information: Patient/ Surescripts/ [facility]/ [facility]IC    Based on information provided by the above source(s), I have updated the patient's home med list as described below. Please review the ACTION REQUESTED section of this note below for any discrepancies on current hospital orders. I changed or updated the following medications on the patient's home medication list:  Removed Lasix 40mg daily - see torsemide  Ibuprofen 600mg - old  Amiodarone 200mg BID - see once daily     Added none     Adjusted   none   Other Notes none         PROVIDER ACTION REQUESTED  Medications that need to be addressed by a physician/nurse practitioner:    Medication Action Requested        none         Please feel free to call me with any questions about this encounter. Thank you.     [facility]   Transitions of Care Pharmacy Service  Phone:  [phone]  Fax: [phone]      Electronically signed by [facility] on 9/20/2022 at 5:31 PM           Medications Prior to Admission: lisinopril (PRINIVIL;ZESTRIL) 5 MG tablet, Take 5 mg by mouth daily  apixaban (ELIQUIS) 5 MG TABS tablet, Take 5 mg by mouth 2 times daily Indications: for atrial fib  metoprolol succinate (TOPROL XL) 50 MG extended release tablet, Take 1 tablet by mouth daily  amiodarone (CORDARONE) 200 MG tablet, Take 1 tablet by mouth daily  clopidogrel (PLAVIX) 75 MG tablet, Take 1 tablet by mouth daily  torsemide (DEMADEX) 20 MG tablet, Take 1 tablet by mouth daily  tamsulosin (FLOMAX) 0.4 MG capsule, Take 1 capsule by mouth nightly  albuterol sulfate HFA (PROVENTIL;VENTOLIN;PROAIR) 108 (90 Base) MCG/ACT inhaler, Inhale 2 puffs into the lungs every 6 hours as needed for Wheezing  Rosuvastatin Calcium 20 MG CPSP, Take 20 mg by mouth Daily  nitroGLYCERIN (NITROSTAT) 0.4 MG SL tablet, Place 0.4 mg under the tongue every 5 minutes as needed for Chest pain up to max of 3 total doses.  If no relief after 1 dose, call 911.  acetaminophen (TYLENOL) 325 MG tablet, Take 650 mg by mouth every 6 hours as needed for Pain  omeprazole (PRILOSEC) 20 MG delayed release capsule, Take 40 mg by mouth Daily

## 2022-09-20 NOTE — CONSULTS
Renal Consult Note    Patient :  Columba Mcclelland; 68 y.o. MRN# [id number]  Location:  2002/2002-02  Attending:  Pippa Kilpatrick DO  Admit Date:  9/20/2022   Hospital Day: 0    Reason for Consult:     Asked by Dr Pippa Kilpatrick DO to see for SANNA/Elevated Creatinine. History Obtained From:     patient, electronic medical record    History of Present Illness:     Columba Mcclelland; 68 y.o. male with past medical history as below presented to the hospital with the chief complaint of to the emergency department for evaluation of decreased urine output. Patient denied any nausea vomiting or diarrhea, does have some recent swelling in feet and ankles. He was found to have hypertension and did take Midodrine about 3 days ago. Patient had 2 admissions in the last couple of months and in August he did get cardiac catheterization with stent placement x2 as JOHANNA.  Patient does report remote history of urinary retention. Patient mentions that he does have an upcoming appointment this month with Dr. Ana M Cardoso. Lab work on presentation showed BUN 60, creatinine of 5.16 mg/DL, sodium 131, potassium 3.3, chloride 89, bicarb 24, calcium 9.0, magnesium 2.2. UA was done which was positive for trace protein, large leuk esterase, WBC too many to count, RBC 20-50 many bacteria. Chest x-ray was negative. KUB was done on 9/19/2022 which showed nonobstructed bowel gas pattern with moderate volume stool. Patient has been having some hypotension with systolics in mid 04X. Patient's baseline creatinine seems to be around 1.2-1.4 mg/dl. As per last renal ultrasound done on 8/22/2022 patient does seem to have atrophied left kidney with size of 7 cm and right kidney 11.5 cm. No hydronephrosis. Increased echogenicity of the left renal cortex which can be seen with chronic medical renal disease. Renal serology was checked in August 2022 which showed SPEP and immunofixation positive for IgM kappa.     In the ED patient received a fluid bolus and was initiated on maintenance fluids of normal saline 100 cc an hour. Patient initially wanted to get transferred to [facility] for further management but due to lack of bed availability he was transferred to [facility] for further evaluation. Home medications do include Lasix 40 mg p.o. daily, lisinopril 2.5 mg daily and also torsemide as mentioned in home meds 20 mg daily as well. Home medications do mention ibuprofen 600 mg every 6 hours as needed for pain as well. Nephrology is consulted due to acute kidney injury. No history of recent contrast exposure, No h/o prolonged NSAIDs use in the past, No h/o nephrolithiasis, No recent skin rashes or arthralgias, No hematuria or pyuria noticed in the recent past. Doesn't report any reduction in the urine output recently. Non report of any obstructive urinary symptoms (urgency, frequency, weak stream, straining while urination). No h/o recurrent UTIs in the past.    Past History/Allergies? Social History:     Past Medical History:   Diagnosis Date    Abdominal aortic aneurysm without rupture (HCC)     Atrial flutter (HCC)     Chronic atrial fibrillation, unspecified (HCC)     Chronic kidney disease     Combined systolic and diastolic heart failure (HCC)     Diverticulosis large intestine w/o perforation or abscess w/bleeding     Hyperlipidemia     Hypotension     Ischemic cardiomyopathy     MI (myocardial infarction) ([facility]     MS (multiple sclerosis) ([facility] [address]     Multiple sclerosis ([facility] [address]     Pacemaker, artificial     Urinary retention        Past Surgical History:   Procedure Laterality Date    CORONARY ANGIOPLASTY WITH STENT PLACEMENT      PACEMAKER PLACEMENT          Allergies   Allergen Reactions    Codeine     Doxycycline     Erythromycin     Gammagard [Immune Globulin]     Sulfa Antibiotics Hives       Social History     Socioeconomic History    Marital status:       Spouse name: Not on file    Number of children: Not on file    Years of education: Not on file    Highest education level: Not on file   Occupational History    Not on file   Tobacco Use    Smoking status: Former    Smokeless tobacco: Never   Substance and Sexual Activity    Alcohol use: No    Drug use: No    Sexual activity: Not on file   Other Topics Concern    Not on file   Social History Narrative    Not on file     Social Determinants of Health     Financial Resource Strain: Not on file   Food Insecurity: Not on file   Transportation Needs: Not on file   Physical Activity: Not on file   Stress: Not on file   Social Connections: Not on file   Intimate Partner Violence: Not on file   Housing Stability: Not on file       Family History:      No family history on file. Outpatient Medications:     Medications Prior to Admission: metoprolol succinate (TOPROL XL) 50 MG extended release tablet, Take 1 tablet by mouth daily  amiodarone (CORDARONE) 200 MG tablet, Take 1 tablet by mouth 2 times daily for 8 days  amiodarone (CORDARONE) 200 MG tablet, Take 1 tablet by mouth daily  clopidogrel (PLAVIX) 75 MG tablet, Take 1 tablet by mouth daily  torsemide (DEMADEX) 20 MG tablet, Take 1 tablet by mouth daily  tamsulosin (FLOMAX) 0.4 MG capsule, Take 1 capsule by mouth nightly  lisinopril (PRINIVIL;ZESTRIL) 2.5 MG tablet, Take 1 tablet by mouth in the morning. apixaban (ELIQUIS) 5 MG TABS tablet, Take by mouth 2 times daily  albuterol sulfate HFA (PROVENTIL;VENTOLIN;PROAIR) 108 (90 Base) MCG/ACT inhaler, Inhale 2 puffs into the lungs every 6 hours as needed for Wheezing  Rosuvastatin Calcium 20 MG CPSP, Take by mouth Daily  nitroGLYCERIN (NITROSTAT) 0.4 MG SL tablet, Place 0.4 mg under the tongue every 5 minutes as needed for Chest pain up to max of 3 total doses.  If no relief after 1 dose, call 911.  acetaminophen (TYLENOL) 325 MG tablet, Take 650 mg by mouth every 6 hours as needed for Pain  omeprazole (PRILOSEC) 20 MG delayed release capsule, Take 20 mg by mouth Daily    Current Medications:     Scheduled Meds:    amiodarone  200 mg Oral Daily    apixaban  5 mg Oral BID    clopidogrel  75 mg Oral Daily    metoprolol succinate  50 mg Oral Daily    pantoprazole  40 mg Oral QAM AC    rosuvastatin  20 mg Oral Daily    sodium chloride flush  5-40 mL IntraVENous 2 times per day    [START ON 2022] cefTRIAXone (ROCEPHIN) IV  1,000 mg IntraVENous Q24H    sodium chloride  500 mL IntraVENous Once     Continuous Infusions:    sodium chloride 100 mL/hr at 22 0636    sodium chloride       PRN Meds:  sodium chloride flush, sodium chloride, ondansetron **OR** ondansetron, acetaminophen **OR** acetaminophen    Review of Systems:     Constitutional: No fever, no chills, no lethargy, no weakness. HEENT:  No headache, otalgia, itchy eyes, nasal discharge or sore throat. Cardiac:  No chest pain, dyspnea, orthopnea or PND. Chest:   No cough, phlegm or wheezing. Abdomen:  No abdominal pain, nausea or vomiting. Neuro:  No focal weakness, abnormal movements or seizure like activity. Skin:   No rashes, no itching. :   No hematuria, no pyuria, no dysuria, no flank pain. +ve decreased urine output. Extremities:  No swelling or joint pains. ROS was otherwise negative except as mentioned in the [address] Input/Output:       No intake/output data recorded. Vital Signs:   Temperature:  Temp: 97.7 °F (36.5 °C)  TMax:   Temp (24hrs), Av.9 °F (36.6 °C), Min:97.6 °F (36.4 °C), Max:98.2 °F (36.8 °C)    Respirations:  Resp: 16  Pulse:   Heart Rate: 69  BP:    BP: (!) 86/45  BP Range: Systolic (47RUC), DNK:78 , Min:71 , HIK:872       Diastolic (77AXX), VPP:54, Min:38, Max:63      Physical Examination:     General:  AAO x 3, speaking in full sentences, no accessory muscle use. HEENT: Atraumatic, normocephalic, no throat congestion, moist mucosa. Eyes:   Pupils equal, round and reactive to light, EOMI. Neck:   Supple  Chest:   Bilateral vesicular breath sounds, no rales or wheezes.   Cardiac:  S1 S2 RR, no murmurs, gallops or rubs. Abdomen: Soft, non-tender, no masses or organomegaly, BS audible. :   No suprapubic or flank tenderness. Neuro:   AAO x 3, No FND. SKIN:  No rashes, good skin turgor. Extremities:  No edema. Labs:       Recent Labs     09/19/22 2108   WBC 7.5   RBC 4.05*   HGB 11.2*   HCT 33.8*   MCV 83.4   MCH 27.5   MCHC 33.0   RDW 16.7*      MPV 7.9      BMP:   Recent Labs     09/19/22 2108 09/19/22 2352 09/20/22  0702   * 132* 140   K 3.3* 3.8 3.2*   CL 89* 92* 104   CO2 24 19* 24   BUN 60* 59* 60*   CREATININE 5.16* 5.41* 4.79*   GLUCOSE 130* 91 94   CALCIUM 9.0 8.4* 8.3*      Phosphorus:     Recent Labs     09/20/22  0702   PHOS 3.7     Magnesium:    Recent Labs     09/19/22 2108 09/20/22  0702   MG 2.2 1.9     Albumin:    Recent Labs     09/19/22 2108   LABALBU 3.7     SPEP:  Lab Results   Component Value Date/Time    PROT 6.6 09/19/2022 09:08 PM    ALBCAL 3.1 08/22/2022 02:27 PM    ALBPCT 54 08/22/2022 02:27 PM    LABALPH 0.2 08/22/2022 02:27 PM    LABALPH 0.9 08/22/2022 02:27 PM    A1PCT 4 08/22/2022 02:27 PM    A2PCT 16 08/22/2022 02:27 PM    LABBETA 0.6 08/22/2022 02:27 PM    BETAPCT 11 08/22/2022 02:27 PM    GAMGLOB 0.9 08/22/2022 02:27 PM    GGPCT 16 08/22/2022 02:27 PM    PATH ELECTRONICALLY SIGNED. Nona Maya M.D. 08/22/2022 02:27 PM    PATH ELECTRONICALLY SIGNED.  Nona Maya M.D. 08/22/2022 02:27 PM     C3:     Lab Results   Component Value Date/Time    C3 153 08/22/2022 02:27 PM     C4:     Lab Results   Component Value Date/Time    C4 33 08/22/2022 02:27 PM     Urinalysis/Chemistries:      Lab Results   Component Value Date/Time    NITRU NEGATIVE 09/19/2022 11:17 PM    COLORU Yellow 09/19/2022 11:17 PM    PHUR 6.0 09/19/2022 11:17 PM    WBCUA TOO NUMEROUS TO COUNT 09/19/2022 11:17 PM    RBCUA 20 TO 50 09/19/2022 11:17 PM    MUCUS 1+ 07/27/2022 05:00 PM    BACTERIA MANY 09/19/2022 11:17 PM    SPECGRAV 1.010 09/19/2022 11:17 PM LEUKOCYTESUR LARGE 09/19/2022 11:17 PM    UROBILINOGEN Normal 09/19/2022 11:17 PM    BILIRUBINUR NEGATIVE 09/19/2022 11:17 PM    GLUCOSEU NEGATIVE 09/19/2022 11:17 PM    KETUA NEGATIVE 09/19/2022 11:17 PM    AMORPHOUS 1+ 07/27/2022 05:00 PM     Urine Creatinine:     Lab Results   Component Value Date/Time    LABCREA 122.0 08/22/2022 04:09 PM     Radiology:     Reviewed. Assessment:     Acute Kidney Injury likely due to some prerenal factors as creatinine is improving with IV fluids along with ATN due to hypotension and underlying infection and further complicated by diuretics and ACE inhibitor's usage at home. Baseline creatinine seems to be around 1.2-1.4 mg/dl. Peak creatinine this admission was 5.41 mg/dl. Creatinine now seems to be improving. Decreased urine output. Hypokalemia. Hypomagnesemia. Metabolic acidosis-improved. Azotemia.  UTI. Hypotension. 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 Ischemic cardiomyopathy status post AICD. History of atrophied left kidney. CKD 3 with baseline creatinine of 1.2-1.4 likely due to ischemic nephrosclerosis along with atrophied left kidney. History of multiple sclerosis with significant disability leading to motor weakness. Coronary artery disease with history of stent placement. Remote history of urinary retention. Plan:     Continue current IV fluids. Agree with holding diuretics and ACE inhibitor's for now. Recommend placing in a Rosas catheter due to acute kidney injury, for strict I's and O's along with history of urinary retention. Will Check Renal Ultrasound to r/o element of obstruction and to assess the kidney size/echotexture. Comprehensive urine testing including Urinalysis, Urine sodium, potassium, chloride, Urine protein and creatinine to quantify the proteinuria if any at all.    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 Will order Hepatitis B and C, JANAE, Complement levels to complete the serological work up. Follow-up urine culture results. Potassium and magnesium replacements ordered. BMP in evening and in AM.  Will follow. Nutrition   Please ensure that patient is on a renal diet/TF. Avoid nephrotoxic drugs/contrast exposure. Thank you for the consultation. Please do not hesitate to contact us for any further questions/concerns. Van Brody MD  [facility]     This note is created with the assistance of a speech-recognition program. While intending to generate a document that actually reflects the content of the visit, no guarantees can be provided that every mistake has been identified and corrected by editing.

## 2022-09-20 NOTE — ED PROVIDER NOTES
[id number] [facility] ED  [address] [facility] [address] [place] [address]  Phone: [phone]  Fax: Giuseppe Loving 112      Pt Name: Isaiah Pandey  MRN: [id number]  [place] 1945  Date of evaluation: 9/19/2022  Provider: Keegan Aguilar PA-C    CHIEF COMPLAINT       Chief Complaint   Patient presents with    Other     Difficulty urinating     Hypotension     Has not had midodrine since friday           HISTORY OF PRESENT ILLNESS  (Location/Symptom, Timing/Onset, Context/Setting, Quality, Duration, Modifying Factors, Severity.)   Isaiah Pandey is a 68 y.o. male who presents to the emergency department for evaluation of poor urine output, last urination was very small and it was earlier today. Some new recent swelling of this feet/ankles. Patient denies any fever/chills/nausea/vomiting/abdominal pain or distention. There is no associated lightheadedness or chest/pleuritic/back pain or symptoms. Patient does report some history of hypotension and last took midodrine 3 days ago on Friday. Patient was admitted here for chest pain and syncope in the dept this July. He was discharged home after fluid resuscitation but came back to this ED for  SOB/fatigue/leg edema and was found to be fluid overloaded. History of tachycardiac and had Cardiac cath on 8/23/22 with 2 stents placed at [facility].. He has a history of cardiomyopathy/heart failure, chronic Afib and MS. He has a history of Stage 3 CKD. Patient has been to a number of rehab facilities and actually was just discharged back home last week. He reports nonbloody diarrhea intermittently over the last 3-4 days as well. He reports that he apparently has been drinking fluids as well as he should. He denies any abdominal distention and is having no abdominal pain symptoms.   He states in the past he has had some cystoscopies for urinary retention but this was sometime ago, he denies any other specific urological conditions but he does see a urologist.  Denies any prostatic disease. Patient has an AICD that was placed at a [facility] earlier this summer. Nursing Notes were reviewed. REVIEW OF SYSTEMS    (2-9 systems for level 4, 10 or more for level 5)     Review of Systems   Constitutional: Negative. HENT: Negative. Eyes: Negative. Respiratory: Negative. Cardiovascular: Negative. Gastrointestinal: Negative. Musculoskeletal: Negative. Endocrine: Negative. Genitourinary: Negative. Skin: Negative. Allergic/Immunologic: Negative. Neurological: Negative. Hematological: Negative. Psychiatric/Behavioral: Negative. Except as noted above the remainder of the review of systems was reviewed and negative. PAST MEDICAL HISTORY   History reviewed. Past Medical History:   Diagnosis Date    Abdominal aortic aneurysm without rupture (HCC)     Atrial flutter (HCC)     Chronic atrial fibrillation, unspecified (HCC)     Chronic kidney disease     Combined systolic and diastolic heart failure (HCC)     Diverticulosis large intestine w/o perforation or abscess w/bleeding     Hyperlipidemia     Hypotension     Ischemic cardiomyopathy     MI (myocardial infarction) ([facility] [address] [address]     MS (multiple sclerosis) ([facility] [address]     Multiple sclerosis ([facility]     Pacemaker, artificial     Urinary retention          SURGICAL HISTORY     History reviewed.     Past Surgical History:   Procedure Laterality Date    CORONARY ANGIOPLASTY WITH STENT PLACEMENT      PACEMAKER PLACEMENT      aicd         CURRENT MEDICATIONS       Discharge Medication List as of 9/20/2022  6:01 AM        CONTINUE these medications which have NOT CHANGED    Details   metoprolol succinate (TOPROL XL) 50 MG extended release tablet Take 1 tablet by mouth daily, Disp-30 tablet, R-3DC to SNF      amiodarone (CORDARONE) 200 MG tablet Take 1 tablet by mouth daily, Disp-30 tablet, R-0DC to SNF      clopidogrel (PLAVIX) 75 MG tablet Take 1 tablet by mouth daily, Disp-30 tablet, R-3DC to SNF      torsemide (DEMADEX) 20 MG tablet Take 1 tablet by mouth daily, Disp-30 tablet, R-3DC to SNF      tamsulosin (FLOMAX) 0.4 MG capsule Take 1 capsule by mouth nightly, Disp-30 capsule, R-3Normal      lisinopril (PRINIVIL;ZESTRIL) 2.5 MG tablet Take 1 tablet by mouth in the morning., Disp-30 tablet, R-3Normal      albuterol sulfate HFA (PROVENTIL;VENTOLIN;PROAIR) 108 (90 Base) MCG/ACT inhaler Inhale 2 puffs into the lungs every 6 hours as needed for WheezingHistorical Med      Rosuvastatin Calcium 20 MG CPSP Take by mouth DailyHistorical Med      nitroGLYCERIN (NITROSTAT) 0.4 MG SL tablet Place 0.4 mg under the tongue every 5 minutes as needed for Chest pain up to max of 3 total doses. If no relief after 1 dose, call 911. Historical Med      acetaminophen (TYLENOL) 325 MG tablet Take 650 mg by mouth every 6 hours as needed for PainHistorical Med      omeprazole (PRILOSEC) 20 MG delayed release capsule Take 20 mg by mouth DailyHistorical Med      apixaban (ELIQUIS) 5 MG TABS tablet Take by mouth 2 times dailyHistorical Med             ALLERGIES     Codeine, Doxycycline, Erythromycin, Gammagard [immune globulin], and Sulfa antibiotics    FAMILY HISTORY     History reviewed. No pertinent family history. No family status information on file. SOCIAL HISTORY      reports that he has quit smoking. He has never used smokeless tobacco. He reports that he does not drink alcohol and does not use drugs. lives at home with other     PHYSICAL EXAM    (up to 7 for level 4, 8 or more for level 5)     ED Triage Vitals   BP Temp Temp Source Heart Rate Resp SpO2 Height Weight   09/19/22 2006 09/19/22 2010 09/19/22 2010 09/19/22 2010 09/19/22 2010 09/19/22 2010 09/19/22 2010 --   (!) 79/43 98.2 °F (36.8 °C) Oral 88 16 98 % 5' 9\" (1.753 m)        Physical Exam   Nursing note and vitals reviewed. Constitutional:   Well-developed and well-nourished. No lethargy. Nontoxic.  + malaise.    Head: Normocephalic and atraumatic. Ear: External ears normal.   Nose: Nose normal and midline. Eyes: Conjunctivae and EOM are normal. Pupils are equal/round  Neck: Normal range of motion. Oral/Throat: Posterior pharynx wnl, Airway is patent. Oral hydration satisfactory. Cardiovascular: Normal rate, regular rhythm, normal heart sounds   Pulmonary/Chest: Effort normal and breath sounds normal. No wheezes/rales/rhonchi. Abdominal: Soft. Bowel sounds are normal. No distension or obvious mass/herniation. No TTP. Musculoskeletal: Normal to inspection. NV intact x 4. No signs of acute limb ischemia. Neurological: Alert, age appropriate mentation and interaction. Skin: Skin is warm and dry. No rash noted. Psychiatric: Mood, memory, affect and judgment normal.       DIAGNOSTIC RESULTS     EKG: All EKG's are interpreted by the Emergency Department Physician who either signs or Co-signs this chart in the absence of a cardiologist.    Not indicated OR per attending note    RADIOLOGY:   Non-plain film images such as CT, Ultrasound and MRI are read by the radiologist. Plain radiographic images are visualized and preliminarily interpreted by the emergency physician with the below findings:      Interpretation per the Radiologist below, if available at the time of this note:    XR CHEST PORTABLE   Final Result   No acute process. XR ABDOMEN (KUB) (SINGLE AP VIEW)   Final Result   Nonobstructive bowel gas pattern with moderate volume stool.                  LABS:  Labs Reviewed   CBC WITH AUTO DIFFERENTIAL - Abnormal; Notable for the following components:       Result Value    RBC 4.05 (*)     Hemoglobin 11.2 (*)     Hematocrit 33.8 (*)     RDW 16.7 (*)     Seg Neutrophils 76 (*)     Lymphocytes 14 (*)     All other components within normal limits   COMPREHENSIVE METABOLIC PANEL W/ REFLEX TO MG FOR LOW K - Abnormal; Notable for the following components:    Glucose 130 (*)     BUN 60 (*)     Creatinine 5.16 (*)     Sodium 131 (*)     Potassium 3.3 (*)     Chloride 89 (*)     Anion Gap 18 (*)     GFR Non- 11 (*)     GFR  13 (*)     All other components within normal limits   TROPONIN - Abnormal; Notable for the following components:    Troponin, High Sensitivity 137 (*)     All other components within normal limits   BRAIN NATRIURETIC PEPTIDE - Abnormal; Notable for the following components:    Pro-BNP 3,156 (*)     All other components within normal limits   URINALYSIS WITH REFLEX TO CULTURE - Abnormal; Notable for the following components:    Turbidity UA SLIGHTLY CLOUDY (*)     Urine Hgb MODERATE (*)     Protein, UA TRACE (*)     Leukocyte Esterase, Urine LARGE (*)     All other components within normal limits   BASIC METABOLIC PANEL - Abnormal; Notable for the following components:    BUN 59 (*)     Creatinine 5.41 (*)     Calcium 8.4 (*)     Sodium 132 (*)     Chloride 92 (*)     CO2 19 (*)     Anion Gap 21 (*)     GFR Non- 10 (*)     GFR  13 (*)     All other components within normal limits   MICROSCOPIC URINALYSIS - Abnormal; Notable for the following components:    Bacteria, UA MANY (*)     Other Observations UA Culture ordered based on defined criteria. (*)     All other components within normal limits   TROPONIN - Abnormal; Notable for the following components:    Troponin, High Sensitivity 110 (*)     All other components within normal limits   CULTURE, URINE   MAGNESIUM         All other labs were within normal range or not returned as of this dictation.     EMERGENCY DEPARTMENT COURSE and DIFFERENTIAL DIAGNOSIS/MDM:   Vitals:    Vitals:    09/19/22 2130 09/19/22 2145 09/19/22 2200 09/20/22 0527   BP: (!) 118/44 (!) 94/50 (!) 99/48 (!) 91/52   Pulse:    74   Resp:    16   Temp:    97.9 °F (36.6 °C)   TempSrc:    Oral   SpO2:    98%   Height:           2030  Nontoxic patient here for evaluation of poor urine output, last urination was earlier today and it was very scant. Known CKD history and previous remote urinary retention history. Patient does have some history of hypotension and he is significantly hypotensive at this time. He takes Midodrine. He denies any significant feelings of lightheadedness and he is alert/appropriate on my exam.  Abdomen is benign. He states he has been drinking fluids as much as he probably should, he doesn't appear orally dehydrated. LCTA. He has been having some associated diarrhea recently as well. Completing a cardiac work-up as well as getting an abdominal x-ray. 2100  Attending to complete all remaining care, diagnosis and disposition for this patient. We discussed this patient prior to my departure. My note will be refreshed to reflect these details, though I was not actively involved in this patient's care following the time stamp above. CONSULTS:  None    PROCEDURES:  None        FINAL IMPRESSION      1. Acute renal failure, unspecified acute renal failure type ([facility] [address]            DISPOSITION/PLAN   DISPOSITION Decision To Transfer    CONDITION:  Stable    PATIENT REFERRED TO:  No follow-up provider specified.     DISCHARGE MEDICATIONS:  Discharge Medication List as of 9/20/2022  6:01 AM          (Please note that portions of this note were completed with a voice recognition program.  Efforts were made to edit the dictations but occasionally words are mis-transcribed.)    SHAWN Garcia PA-C  09/20/22 1949

## 2022-09-21 LAB
ALBUMIN SERPL-MCNC: 2.7 G/DL (ref 3.5–5.2)
ALP BLD-CCNC: 68 U/L (ref 40–129)
ALT SERPL-CCNC: 9 U/L (ref 5–41)
ANION GAP SERPL CALCULATED.3IONS-SCNC: 12 MMOL/L (ref 9–17)
AST SERPL-CCNC: 9 U/L
BILIRUB SERPL-MCNC: 0.3 MG/DL (ref 0.3–1.2)
BUN BLDV-MCNC: 48 MG/DL (ref 8–23)
BUN/CREAT BLD: 13 (ref 9–20)
CALCIUM SERPL-MCNC: 8.1 MG/DL (ref 8.6–10.4)
CHLORIDE BLD-SCNC: 110 MMOL/L (ref 98–107)
CO2: 21 MMOL/L (ref 20–31)
CREAT SERPL-MCNC: 3.65 MG/DL (ref 0.7–1.2)
GFR AFRICAN AMERICAN: 20 ML/MIN
GFR NON-AFRICAN AMERICAN: 16 ML/MIN
GFR SERPL CREATININE-BSD FRML MDRD: ABNORMAL ML/MIN/{1.73_M2}
GLUCOSE BLD-MCNC: 89 MG/DL (ref 70–99)
INR BLD: 2.4
MAGNESIUM: 2 MG/DL (ref 1.6–2.6)
POTASSIUM SERPL-SCNC: 3.6 MMOL/L (ref 3.7–5.3)
PROTHROMBIN TIME: 25.8 SEC (ref 11.5–14.2)
SODIUM BLD-SCNC: 143 MMOL/L (ref 135–144)
TOTAL PROTEIN: 5.2 G/DL (ref 6.4–8.3)

## 2022-09-21 PROCEDURE — 6370000000 HC RX 637 (ALT 250 FOR IP): Performed by: NURSE PRACTITIONER

## 2022-09-21 PROCEDURE — 6360000002 HC RX W HCPCS: Performed by: NURSE PRACTITIONER

## 2022-09-21 PROCEDURE — 85610 PROTHROMBIN TIME: CPT

## 2022-09-21 PROCEDURE — 83735 ASSAY OF MAGNESIUM: CPT

## 2022-09-21 PROCEDURE — 6370000000 HC RX 637 (ALT 250 FOR IP): Performed by: INTERNAL MEDICINE

## 2022-09-21 PROCEDURE — 99232 SBSQ HOSP IP/OBS MODERATE 35: CPT | Performed by: INTERNAL MEDICINE

## 2022-09-21 PROCEDURE — 2580000003 HC RX 258: Performed by: INTERNAL MEDICINE

## 2022-09-21 PROCEDURE — 80053 COMPREHEN METABOLIC PANEL: CPT

## 2022-09-21 PROCEDURE — 1200000000 HC SEMI PRIVATE

## 2022-09-21 PROCEDURE — 2580000003 HC RX 258: Performed by: NURSE PRACTITIONER

## 2022-09-21 PROCEDURE — 36415 COLL VENOUS BLD VENIPUNCTURE: CPT

## 2022-09-21 PROCEDURE — 51798 US URINE CAPACITY MEASURE: CPT

## 2022-09-21 RX ORDER — SENNA PLUS 8.6 MG/1
1 TABLET ORAL NIGHTLY
Status: DISCONTINUED | OUTPATIENT
Start: 2022-09-21 | End: 2022-09-22 | Stop reason: HOSPADM

## 2022-09-21 RX ORDER — BISACODYL 10 MG
10 SUPPOSITORY, RECTAL RECTAL DAILY
Status: DISCONTINUED | OUTPATIENT
Start: 2022-09-22 | End: 2022-09-22 | Stop reason: HOSPADM

## 2022-09-21 RX ADMIN — SENNOSIDES 8.6 MG: 8.6 TABLET, FILM COATED ORAL at 18:14

## 2022-09-21 RX ADMIN — APIXABAN 5 MG: 5 TABLET, FILM COATED ORAL at 09:23

## 2022-09-21 RX ADMIN — ACETAMINOPHEN 650 MG: 325 TABLET ORAL at 14:23

## 2022-09-21 RX ADMIN — SODIUM CHLORIDE: 9 INJECTION, SOLUTION INTRAVENOUS at 14:24

## 2022-09-21 RX ADMIN — METOPROLOL SUCCINATE 50 MG: 50 TABLET, EXTENDED RELEASE ORAL at 09:23

## 2022-09-21 RX ADMIN — SODIUM CHLORIDE: 9 INJECTION, SOLUTION INTRAVENOUS at 02:44

## 2022-09-21 RX ADMIN — SODIUM CHLORIDE: 9 INJECTION, SOLUTION INTRAVENOUS at 20:02

## 2022-09-21 RX ADMIN — AMIODARONE HYDROCHLORIDE 200 MG: 200 TABLET ORAL at 09:23

## 2022-09-21 RX ADMIN — POLYETHYLENE GLYCOL 3350 17 G: 17 POWDER, FOR SOLUTION ORAL at 09:23

## 2022-09-21 RX ADMIN — ACETAMINOPHEN 650 MG: 325 TABLET ORAL at 06:58

## 2022-09-21 RX ADMIN — APIXABAN 5 MG: 5 TABLET, FILM COATED ORAL at 20:03

## 2022-09-21 RX ADMIN — CLOPIDOGREL BISULFATE 75 MG: 75 TABLET ORAL at 09:23

## 2022-09-21 RX ADMIN — ROSUVASTATIN CALCIUM 10 MG: 10 TABLET, FILM COATED ORAL at 09:23

## 2022-09-21 RX ADMIN — PANTOPRAZOLE SODIUM 40 MG: 40 TABLET, DELAYED RELEASE ORAL at 09:24

## 2022-09-21 RX ADMIN — CEFTRIAXONE SODIUM 1000 MG: 1 INJECTION, POWDER, FOR SOLUTION INTRAMUSCULAR; INTRAVENOUS at 02:45

## 2022-09-21 ASSESSMENT — PAIN DESCRIPTION - LOCATION
LOCATION: PENIS
LOCATION: PENIS;ABDOMEN

## 2022-09-21 ASSESSMENT — PAIN DESCRIPTION - DESCRIPTORS
DESCRIPTORS: ACHING
DESCRIPTORS: ACHING

## 2022-09-21 ASSESSMENT — PAIN SCALES - GENERAL
PAINLEVEL_OUTOF10: 5
PAINLEVEL_OUTOF10: 3
PAINLEVEL_OUTOF10: 0

## 2022-09-21 ASSESSMENT — PAIN DESCRIPTION - PAIN TYPE: TYPE: ACUTE PAIN

## 2022-09-21 ASSESSMENT — PAIN DESCRIPTION - ONSET: ONSET: ON-GOING

## 2022-09-21 ASSESSMENT — PAIN DESCRIPTION - FREQUENCY: FREQUENCY: INTERMITTENT

## 2022-09-21 ASSESSMENT — PAIN DESCRIPTION - ORIENTATION: ORIENTATION: MID

## 2022-09-21 ASSESSMENT — PAIN - FUNCTIONAL ASSESSMENT
PAIN_FUNCTIONAL_ASSESSMENT: ACTIVITIES ARE NOT PREVENTED
PAIN_FUNCTIONAL_ASSESSMENT: ACTIVITIES ARE NOT PREVENTED

## 2022-09-21 NOTE — CARE COORDINATION
Social work: pt refused the idea of a rehab hospital. Wants only to go home. Has some care lined up.   Dearl Gonzalo eduardo

## 2022-09-21 NOTE — PROGRESS NOTES
[facility] NOTE    Room # 2002/2002-02   Name: Samantha Melo            Adventist: No Sabianism     Reason for visit: Routine    I visited the patient. Admit Date & Time: 9/20/2022  6:28 AM    Assessment:  Samantha Melo is a 68 y.o. male in the hospital because he has kidney injury. Upon entering the room pt. Is found resting in bed, awake, alert and typing on laptop. Pt. Welcomes visit. Intervention:  I introduced myself and my title as spiritual care provider I offered space for patient  to express feelings, needs, and concerns and provided a ministry presence. Pt. Reports that he is doing better and that he hopes to go home tomorrow.  offers active listening and emotional support. Outcome:  Pt. Calm and coping. Plan:  Chaplains will remain available to offer spiritual and emotional support as needed.     Electronically signed by Gil Lujan on 9/21/2022 at 2:45 PM.  [place]

## 2022-09-21 NOTE — PROGRESS NOTES
[facility]  Office: [address], DO, Adry Mckeon, DO, Lenny Garza, DO, Stacia Rousseau Blood, DO, Mary Anne Vilchis MD, Gayle Phan MD, Wendi Peck MD, Dylan Song MD,  Floyd Oro MD, Reese Clinton MD, Villa Griffni, DO, Zander Miner MD,  Arthur Franklin MD, Courtney Lopez MD, Lona Santiago DO, Keyla Tinoco MD, Tor Harrell MD, Niurka Herrera MD, Collette Hahn, MD, Genny Cruz MD, Nicolas Barreto MD, Clara Aleman DO, Hair Roche MD, Kristen Coy MD, Adry Mejía, CNP,  Leida Miller, CNP, Eduardo Benavidez, CNP, Eduardo Knott, CNP,  Lidia Barron, DNP, Stevo Varela, CNP, Samir Freitas, CNP, Laura Simon, CNP, Phillip Alfred, CNP, Kaylene Curiel, CNP, BECKY GoldenC, Elder Flaherty, CNS, Jd Busch, [facility], Renetta Maynard, CNP, Toña Chaney, CNP, Rohan Blankenship, [address]    Progress Note    9/21/2022    5:49 PM    Name:   Justyna Manriquez  MRN:     [id number]     Acct:      [de-identified]   Room:   2002/2002-02  IP Day:  1  Admit Date:  9/20/2022  6:28 AM    PCP:   Ade Zarco MD  Code Status:  Full Code    Subjective:     C/C: Low urine output    Interval History Status: not changed. Patient seen and examined. No issues overnight. Still no BM, cr improving today     Brief History:     From H&p  Justyna Manriquez is a 68 y.o. male with a past medical history for multiple sclerosis, urinary retention, CKD stage IIIa presents to the emergency department at \[facility]"" for low urine output. Patient in the past has required urinary catheterization and thought that he may need this again. Patient's creatinine was found to be 5.2, patient was recently seen for ventricular tachycardia and was discharged on diuretics. Patient was transferred to [facility] for nephrology evaluation for dialysis.   Patient previously treated for multiple sclerosis with quit smoking. He has never used smokeless tobacco. He reports that he does not drink alcohol and does not use drugs. Family History: No family history on file. Vitals:  BP (!) 98/52   Pulse 60   Temp 97.7 °F (36.5 °C) (Oral)   Resp 16   Ht 5' 9\" (1.753 m)   Wt 147 lb 6 oz (66.8 kg)   SpO2 97%   BMI 21.76 kg/m²   Temp (24hrs), Av °F (36.7 °C), Min:97.7 °F (36.5 °C), Max:99.1 °F (37.3 °C)    Recent Labs     22  2301   POCGLU 119*       I/O (24Hr):     Intake/Output Summary (Last 24 hours) at 2022 1749  Last data filed at 2022 1100  Gross per 24 hour   Intake 3226.67 ml   Output 825 ml   Net 2401.67 ml       Labs:  Hematology:  Recent Labs     22  0736   WBC 7.5  --    RBC 4.05*  --    HGB 11.2*  --    HCT 33.8*  --    MCV 83.4  --    MCH 27.5  --    MCHC 33.0  --    RDW 16.7*  --      --    MPV 7.9  --    INR  --  2.4     Chemistry:  Recent Labs     22  2352 22  0702 22  0736   * 132* 140 139 143   K 3.3* 3.8 3.2* 3.7 3.6*   CL 89* 92* 104 104 110*   CO2 24 19* 24 25 21   GLUCOSE 130* 91 94 106* 89   BUN 60* 59* 60* 55* 48*   CREATININE 5.16* 5.41* 4.79* 4.40* 3.65*   MG 2.2  --  1.9  --  2.0   ANIONGAP 18* 21* 12 10 12   LABGLOM 11* 10* 12* 13* 16*   GFRAA 13* 13* 14* 16* 20*   CALCIUM 9.0 8.4* 8.3* 8.6 8.1*   PHOS  --   --  3.7  --   --    PROBNP 3,156*  --   --   --   --    TROPHS 137* 110*  --   --   --      Recent Labs     22  2108 22  0702 22  2301 22  0736   PROT 6.6  --   --  5.2*   LABALBU 3.7  --   --  2.7*   TSH  --  1.75  --   --    AST 9  --   --  9   ALT 14  --   --  9   ALKPHOS 91  --   --  68   BILITOT 0.5  --   --  0.3   POCGLU  --   --  119*  --      ABG:No results found for: POCPH, PHART, PH, POCPCO2, SEA1JMN, PCO2, POCPO2, PO2ART, PO2, POCHCO3, QUV6YQX, HCO3, NBEA, PBEA, BEART, BE, THGBART, THB, BEC9ARV, AMAG7CTQ, D0HUVEIV, O2SAT, FIO2  Lab Results   Component

## 2022-09-21 NOTE — PROGRESS NOTES
End Of Shift Note  [facility]  Summary of shift: No acute events throughout shift. Patient still has not had BM, received Senakot and Glycolax. Fluids decreased to 50ml/hr per nephrology. Vitals:    Vitals:    09/21/22 0453 09/21/22 0722 09/21/22 1100 09/21/22 1517   BP:  (!) 91/43 (!) 128/43 (!) 98/52   Pulse:  60 55 60   Resp:  16 17 16   Temp:  97.9 °F (36.6 °C) 97.7 °F (36.5 °C) 97.7 °F (36.5 °C)   TempSrc:  Oral Oral Oral   SpO2:  95% 93% 97%   Weight: 147 lb 6 oz (66.8 kg)      Height:            I&O:   Intake/Output Summary (Last 24 hours) at 9/21/2022 1842  Last data filed at 9/21/2022 1839  Gross per 24 hour   Intake 2133.67 ml   Output 1025 ml   Net 1108.67 ml       Resp Status: end expiratory wheezes bilaterally. RA. Ventilator Settings:     / / /     Critical Care IV infusions:   sodium chloride 50 mL/hr at 09/21/22 1424    sodium chloride          LDA:   Peripheral IV 09/21/22 Distal;Right; Anterior Forearm (Active)   Number of days: 0       Urinary Catheter 09/20/22 2 Way (Active)   Number of days: 1

## 2022-09-21 NOTE — PLAN OF CARE
Problem: Chronic Conditions and Co-morbidities  Goal: Patient's chronic conditions and co-morbidity symptoms are monitored and maintained or improved  Outcome: Progressing     Problem: Discharge Planning  Goal: Discharge to home or other facility with appropriate resources  9/20/2022 2119 by Ferdinand Villasenor RN  Outcome: Progressing  9/20/2022 1508 by Brunilda Galo RN  Outcome: Progressing     Problem: Safety - Adult  Goal: Free from fall injury  9/20/2022 2119 by Ferdinand Villasenor RN  Outcome: Progressing  9/20/2022 1508 by Brunilda Galo RN  Outcome: Progressing     Problem: ABCDS Injury Assessment  Goal: Absence of physical injury  Outcome: Progressing     Problem: Skin/Tissue Integrity  Goal: Absence of new skin breakdown  Description: 1. Monitor for areas of redness and/or skin breakdown  2. Assess vascular access sites hourly  3. Every 4-6 hours minimum:  Change oxygen saturation probe site  4. Every 4-6 hours:  If on nasal continuous positive airway pressure, respiratory therapy assess nares and determine need for appliance change or resting period.   Outcome: Progressing     Problem: Pain  Goal: Verbalizes/displays adequate comfort level or baseline comfort level  Outcome: Progressing

## 2022-09-21 NOTE — DISCHARGE INSTR - COC
Continuity of Care Form    Patient Name: Rashid Sosa   :  1945  MRN:  [id number]    Admit date:  2022  Discharge date:  2022    Code Status Order: Full Code   Advance Directives:     Admitting Physician:  No admitting provider for patient encounter. PCP: Dustin Varela MD    Discharging Nurse: [facility] Unit/Room#:   Discharging Unit Phone Number: [phone]    Emergency Contact:   Extended Emergency Contact Information  Primary Emergency Contact: [address] [place] [address] Phone: [phone]  Relation: Child  Secondary Emergency Contact: Margret Krause  Mobile Phone: [phone]  Relation: Child    Past Surgical History:  Past Surgical History:   Procedure Laterality Date    CORONARY ANGIOPLASTY WITH STENT PLACEMENT      PACEMAKER PLACEMENT      aicd       Immunization History: There is no immunization history on file for this patient.     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    Coronary artery disease involving native coronary artery of native heart I25.10    Ischemic cardiomyopathy I25.5    Stage 3a chronic kidney disease (HCC) N18.31    Chronic atrial fibrillation (HCC) I48.20    NSTEMI (non-ST elevated myocardial infarction) ([facility] [address] I21.4    Weakness R53.1    NSVT (nonsustained ventricular tachycardia) ([facility]) I47.2    Combined systolic and diastolic cardiac dysfunction I51.89    Longstanding persistent atrial fibrillation (HCC) I48.11    Monomorphic ventricular tachycardia (HCC) I47.2    Fatigue R53.83    Stage 3b chronic kidney disease (HCC) N18.32    Essential hypertension I10    Acute kidney injury superimposed on CKD ([address] N17.9, N18.9    AICD (automatic cardioverter/defibrillator) present Z95.810    SANNA (acute kidney injury) ([address] N17.9    Multiple sclerosis ([facility] [address] G35       Isolation/Infection:   Isolation            No Isolation Patient Infection Status       None to display            Nurse Assessment:  Last Vital Signs: BP (!) 128/43   Pulse 55   Temp 97.7 °F (36.5 °C) (Oral)   Resp 17   Ht 5' 9\" (1.753 m)   Wt 147 lb 6 oz (66.8 kg)   SpO2 93%   BMI 21.76 kg/m²     Last documented pain score (0-10 scale): Pain Level: 3  Last Weight:   Wt Readings from Last 1 Encounters:   09/21/22 147 lb 6 oz (66.8 kg)     Mental Status:  oriented and alert    IV Access:  - None    Nursing Mobility/ADLs:  Walking   Assisted  Transfer  Assisted  Bathing  Assisted  Dressing  Assisted  Toileting  Assisted  Feeding  Independent  Med Admin  Assisted  Med Delivery   whole    Wound Care Documentation and Therapy:        Elimination:  Continence: Bowel: Yes  Bladder: Yes  Urinary Catheter: None   Colostomy/Ileostomy/Ileal Conduit: No       Date of Last BM: ***    Intake/Output Summary (Last 24 hours) at 9/21/2022 1216  Last data filed at 9/21/2022 1100  Gross per 24 hour   Intake 3226.67 ml   Output 1075 ml   Net 2151.67 ml     I/O last 3 completed shifts: In: 3226.7 [I.V.:2686.7; IV Piggyback:540]  Out: 637 [Urine:775]    Safety Concerns:     History of Falls (last 30 days) and At Risk for Falls    Impairments/Disabilities:      None    Nutrition Therapy:  Current Nutrition Therapy:   - Oral Diet:  General    Routes of Feeding: Oral  Liquids: No Restrictions  Daily Fluid Restriction: no  Last Modified Barium Swallow with Video (Video Swallowing Test): not done    Treatments at the Time of Hospital Discharge:   Respiratory Treatments: ***  Oxygen Therapy:  is not on home oxygen therapy.   Ventilator:    - No ventilator support    Rehab Therapies: Physical Therapy and Occupational Therapy  Weight Bearing Status/Restrictions: No weight bearing restrictions  Other Medical Equipment (for information only, NOT a DME order):  walker and scooter  Other Treatments:   SN     Patient's personal belongings (please select all that are sent with patient):  Clothes, foot wear, cell phone    RN SIGNATURE:  Electronically signed by Chaz Jones RN on 9/22/22 at 10:54 AM EDT    CASE MANAGEMENT/SOCIAL WORK SECTION    Inpatient Status Date: ***    Readmission Risk Assessment Score:  Readmission Risk              Risk of Unplanned Readmission:  30           Discharging to Facility/ Agency   Name: [facility]   Address: [address] [place] [address] [address] [place] [address]  Phone: [phone]  Fax:Home care agency will pull data electronically. / signature: Electronically signed by Bam Tran RN on 9/21/22 at 4:40 PM EDT    PHYSICIAN SECTION    Prognosis: Good    Condition at Discharge: Stable    Rehab Potential (if transferring to Rehab): Good    Recommended Labs or Other Treatments After Discharge: none    Physician Certification: I certify the above information and transfer of Rajiv Bryson  is necessary for the continuing treatment of the diagnosis listed and that he requires [address] for greater 30 days.      Update Admission H&P: No change in H&P    PHYSICIAN SIGNATURE:  Electronically signed by Clara Childers DO on 9/22/22 at 4:53 PM EDT

## 2022-09-21 NOTE — PROGRESS NOTES
NEPHROLOGY PROGRESS NOTE      SUBJECTIVE     Presented with inability to make urine along with constipation last 5 to 7 days. Assessment showed hypotensive gentleman with 200 cc of urine drained after Rosas placement. Fluid resuscitation. Optimization of hemodynamics. Investigations revealed elevated creatinine prompting nephrology consultation. Renal function continues to improve. Creatinine down to 3.6. Has indwelling Rosas catheter in place. No signs of hydronephrosis on ultrasound. Urine output decent. Urine to be discharged home. No overnight issues reported with nursing staff. Blood pressure improving. Cultures so far negative. OBJECTIVE     Vitals:    09/21/22 0434 09/21/22 0453 09/21/22 0722 09/21/22 1100   BP: (!) 123/94  (!) 91/43 (!) 128/43   Pulse: 65  60 55   Resp: 16  16 17   Temp: 99.1 °F (37.3 °C)  97.9 °F (36.6 °C) 97.7 °F (36.5 °C)   TempSrc: Oral  Oral Oral   SpO2: 98%  95% 93%   Weight:  147 lb 6 oz (66.8 kg)     Height:         24HR INTAKE/OUTPUT:    Intake/Output Summary (Last 24 hours) at 9/21/2022 1145  Last data filed at 9/21/2022 1100  Gross per 24 hour   Intake 3226.67 ml   Output 1075 ml   Net 2151.67 ml       General appearance:Awake, alert, in no acute distress  HEENT: PERRLA  Respiratory::vesicular breath sounds, present wheeze  Cardiovascular:S1 S2 normal,no gallop or organic murmur. Abdomen:Non tender/non distended. Bowel sounds present  Extremities: No Cyanosis or Clubbing,Lower extremity edema  Neurological:Alert and oriented. No abnormalities of mood, affect, memory, mentation, or behavior are noted      MEDICATIONS     Scheduled Meds:    amiodarone  200 mg Oral Daily    apixaban  5 mg Oral BID    clopidogrel  75 mg Oral Daily    metoprolol succinate  50 mg Oral Daily    pantoprazole  40 mg Oral QAM AC    sodium chloride flush  5-40 mL IntraVENous 2 times per day    cefTRIAXone (ROCEPHIN) IV  1,000 mg IntraVENous Q24H    rosuvastatin  10 mg Oral Daily polyethylene glycol  17 g Oral Daily     Continuous Infusions:    sodium chloride 100 mL/hr at 09/21/22 0538    sodium chloride       PRN Meds:  sodium chloride flush, sodium chloride, ondansetron **OR** ondansetron, acetaminophen **OR** acetaminophen, bisacodyl  Home Meds:                Medications Prior to Admission: lisinopril (PRINIVIL;ZESTRIL) 5 MG tablet, Take 5 mg by mouth daily  apixaban (ELIQUIS) 5 MG TABS tablet, Take 5 mg by mouth 2 times daily Indications: for atrial fib  metoprolol succinate (TOPROL XL) 50 MG extended release tablet, Take 1 tablet by mouth daily  amiodarone (CORDARONE) 200 MG tablet, Take 1 tablet by mouth daily  clopidogrel (PLAVIX) 75 MG tablet, Take 1 tablet by mouth daily  torsemide (DEMADEX) 20 MG tablet, Take 1 tablet by mouth daily  tamsulosin (FLOMAX) 0.4 MG capsule, Take 1 capsule by mouth nightly  albuterol sulfate HFA (PROVENTIL;VENTOLIN;PROAIR) 108 (90 Base) MCG/ACT inhaler, Inhale 2 puffs into the lungs every 6 hours as needed for Wheezing  Rosuvastatin Calcium 20 MG CPSP, Take 20 mg by mouth Daily  nitroGLYCERIN (NITROSTAT) 0.4 MG SL tablet, Place 0.4 mg under the tongue every 5 minutes as needed for Chest pain up to max of 3 total doses.  If no relief after 1 dose, call 911.  acetaminophen (TYLENOL) 325 MG tablet, Take 650 mg by mouth every 6 hours as needed for Pain  omeprazole (PRILOSEC) 20 MG delayed release capsule, Take 40 mg by mouth Daily    INVESTIGATIONS     Last 3 CMP:    Recent Labs     09/19/22 2108 09/19/22  2352 09/20/22  0702 09/20/22  2101 09/21/22  0736   *   < > 140 139 143   K 3.3*   < > 3.2* 3.7 3.6*   CL 89*   < > 104 104 110*   CO2 24   < > 24 25 21   BUN 60*   < > 60* 55* 48*   CREATININE 5.16*   < > 4.79* 4.40* 3.65*   CALCIUM 9.0   < > 8.3* 8.6 8.1*   PROT 6.6  --   --   --  5.2*   LABALBU 3.7  --   --   --  2.7*   BILITOT 0.5  --   --   --  0.3   ALKPHOS 91  --   --   --  68   AST 9  --   --   --  9   ALT 14  --   --   --  9    < > = values in this interval not displayed. Last 3 CBC:  Recent Labs     09/19/22  2108   WBC 7.5   RBC 4.05*   HGB 11.2*   HCT 33.8*   MCV 83.4   MCH 27.5   MCHC 33.0   RDW 16.7*      MPV 7.9       ASSESSMENT     #1 acute kidney injury nonoliguric secondary to prerenal injury from hypotension related to suspected UTI/urinary retention and complicated further by concomitant diuretic and ACE inhibitor use. Creatinine peaked to 5.4 and improving. #2 chronic kidney disease stage III secondary to ischemic nephrosclerosis with baseline creatinine 1.2-1.4.   We will schedule an appointment coming up with Dr. Gloria Damon  #3 UTI  #4 urine retention with a diagnosis of multiple sclerosis  #5 ischemic cardiomyopathy status post AICD in place EF 25 to 30%  #6 COPD    PLAN     #1 reduce fluid rate  #2 we will resume diuretics in the next 24 to 48 hours  #3 follow-up culture results  #4 antibiotics as per GFR less than 15  #5 we will follow    Please do not hesitate to call with questions    This note is created with the assistance of a speech-recognition program. While intending to generate a document that actually reflects the content of the visit, no guarantees can be provided that every mistake has been identified and corrected by editing    Enrico Osorio MD MD, [facility] [place] [address]   9/21/2022 11:45 AM  [facility]

## 2022-09-21 NOTE — PROGRESS NOTES
Physical Therapy  DATE: 2022    NAME: Rashid Sosa  MRN: [id number]   : 1945    Patient not seen this date for Physical Therapy due to:      [] Cancel by RN or physician due to:    [] Hemodialysis    [] Critical Lab Value Level     [] Blood transfusion in progress    [] Acute or unstable cardiovascular status   _MAP < 55 or more than >115  _HR < 40 or > 130    [] Acute or unstable pulmonary status   -FiO2 > 60%   _RR < 5 or >40    _O2 sats < 85%    [] Strict Bedrest    [] Off Unit for surgery or procedure    [] Off Unit for testing       [] Pending imaging to R/O fracture    [x] Refusal by Patient:  Pt declining to participate in therapy evaluation this date stating \"I already have home care set up and I don't need anything. I'm going home tomorrow. \"  Writer educated pt on purpose of acute PT eval and importance of continued mobility throughout admission, pt continuing to refuse therapy eval.  Writer notified pt we will continue to check on him throughout admission. PT will continue to follow and ck back as able. [] Other      [] PT being discontinued at this time. Patient independent. No further needs. [] PT being discontinued at this time as the patient has been transferred to hospice care. No further needs.       Lou Ly, PT

## 2022-09-22 VITALS
RESPIRATION RATE: 17 BRPM | WEIGHT: 145 LBS | DIASTOLIC BLOOD PRESSURE: 50 MMHG | HEART RATE: 65 BPM | OXYGEN SATURATION: 97 % | HEIGHT: 69 IN | BODY MASS INDEX: 21.48 KG/M2 | TEMPERATURE: 98.7 F | SYSTOLIC BLOOD PRESSURE: 100 MMHG

## 2022-09-22 PROBLEM — K59.04 CHRONIC IDIOPATHIC CONSTIPATION: Status: ACTIVE | Noted: 2022-09-22

## 2022-09-22 LAB
ALBUMIN SERPL-MCNC: 2.7 G/DL (ref 3.5–5.2)
ALP BLD-CCNC: 65 U/L (ref 40–129)
ALT SERPL-CCNC: 9 U/L (ref 5–41)
ANION GAP SERPL CALCULATED.3IONS-SCNC: 11 MMOL/L (ref 9–17)
ANTI DNA DOUBLE STRANDED: <0.5 IU/ML
ANTI-NUCLEAR ANTIBODY (ANA): NEGATIVE
AST SERPL-CCNC: 9 U/L
BILIRUB SERPL-MCNC: 0.2 MG/DL (ref 0.3–1.2)
BUN BLDV-MCNC: 38 MG/DL (ref 8–23)
BUN/CREAT BLD: 13 (ref 9–20)
CALCIUM SERPL-MCNC: 8 MG/DL (ref 8.6–10.4)
CHLORIDE BLD-SCNC: 110 MMOL/L (ref 98–107)
CO2: 21 MMOL/L (ref 20–31)
CREAT SERPL-MCNC: 2.93 MG/DL (ref 0.7–1.2)
CULTURE: ABNORMAL
CULTURE: ABNORMAL
ENA ANTIBODIES SCREEN: 0.1 U/ML
GFR AFRICAN AMERICAN: 25 ML/MIN
GFR NON-AFRICAN AMERICAN: 21 ML/MIN
GFR SERPL CREATININE-BSD FRML MDRD: ABNORMAL ML/MIN/{1.73_M2}
GLUCOSE BLD-MCNC: 80 MG/DL (ref 70–99)
POTASSIUM SERPL-SCNC: 4.1 MMOL/L (ref 3.7–5.3)
SODIUM BLD-SCNC: 142 MMOL/L (ref 135–144)
SPECIMEN DESCRIPTION: ABNORMAL
SPECIMEN DESCRIPTION: ABNORMAL
TOTAL PROTEIN: 5 G/DL (ref 6.4–8.3)

## 2022-09-22 PROCEDURE — 2580000003 HC RX 258: Performed by: NURSE PRACTITIONER

## 2022-09-22 PROCEDURE — 80053 COMPREHEN METABOLIC PANEL: CPT

## 2022-09-22 PROCEDURE — 6370000000 HC RX 637 (ALT 250 FOR IP): Performed by: NURSE PRACTITIONER

## 2022-09-22 PROCEDURE — 2580000003 HC RX 258: Performed by: INTERNAL MEDICINE

## 2022-09-22 PROCEDURE — 6370000000 HC RX 637 (ALT 250 FOR IP): Performed by: INTERNAL MEDICINE

## 2022-09-22 PROCEDURE — 36415 COLL VENOUS BLD VENIPUNCTURE: CPT

## 2022-09-22 PROCEDURE — 6360000002 HC RX W HCPCS: Performed by: NURSE PRACTITIONER

## 2022-09-22 PROCEDURE — 99239 HOSP IP/OBS DSCHRG MGMT >30: CPT | Performed by: INTERNAL MEDICINE

## 2022-09-22 PROCEDURE — 6360000002 HC RX W HCPCS: Performed by: INTERNAL MEDICINE

## 2022-09-22 RX ORDER — AMPICILLIN 500 MG/1
500 CAPSULE ORAL 4 TIMES DAILY
Qty: 28 CAPSULE | Refills: 0 | Status: SHIPPED | OUTPATIENT
Start: 2022-09-22 | End: 2022-09-29

## 2022-09-22 RX ORDER — SENNA PLUS 8.6 MG/1
1 TABLET ORAL NIGHTLY
Qty: 30 TABLET | Refills: 0 | Status: SHIPPED | OUTPATIENT
Start: 2022-09-22 | End: 2022-10-22

## 2022-09-22 RX ORDER — FUROSEMIDE 20 MG/1
20 TABLET ORAL DAILY
Qty: 60 TABLET | Refills: 3 | Status: SHIPPED | OUTPATIENT
Start: 2022-09-22

## 2022-09-22 RX ORDER — BISACODYL 10 MG
10 SUPPOSITORY, RECTAL RECTAL DAILY
Qty: 30 SUPPOSITORY | Refills: 0 | Status: SHIPPED | OUTPATIENT
Start: 2022-09-23 | End: 2022-10-23

## 2022-09-22 RX ADMIN — CEFTRIAXONE SODIUM 1000 MG: 1 INJECTION, POWDER, FOR SOLUTION INTRAMUSCULAR; INTRAVENOUS at 02:57

## 2022-09-22 RX ADMIN — CLOPIDOGREL BISULFATE 75 MG: 75 TABLET ORAL at 08:09

## 2022-09-22 RX ADMIN — AMIODARONE HYDROCHLORIDE 200 MG: 200 TABLET ORAL at 08:09

## 2022-09-22 RX ADMIN — PANTOPRAZOLE SODIUM 40 MG: 40 TABLET, DELAYED RELEASE ORAL at 05:25

## 2022-09-22 RX ADMIN — ROSUVASTATIN CALCIUM 10 MG: 10 TABLET, FILM COATED ORAL at 08:09

## 2022-09-22 RX ADMIN — APIXABAN 5 MG: 5 TABLET, FILM COATED ORAL at 08:09

## 2022-09-22 RX ADMIN — AMPICILLIN SODIUM 2000 MG: 2 INJECTION, POWDER, FOR SOLUTION INTRAMUSCULAR; INTRAVENOUS at 16:45

## 2022-09-22 NOTE — PLAN OF CARE
Flu vaccine documented in record. Problem: Chronic Conditions and Co-morbidities  Goal: Patient's chronic conditions and co-morbidity symptoms are monitored and maintained or improved  Outcome: Progressing  Flowsheets (Taken 9/21/2022 1920)  Care Plan - Patient's Chronic Conditions and Co-Morbidity Symptoms are Monitored and Maintained or Improved:   Monitor and assess patient's chronic conditions and comorbid symptoms for stability, deterioration, or improvement   Collaborate with multidisciplinary team to address chronic and comorbid conditions and prevent exacerbation or deterioration     Problem: Discharge Planning  Goal: Discharge to home or other facility with appropriate resources  Outcome: Progressing  Flowsheets (Taken 9/21/2022 1920)  Discharge to home or other facility with appropriate resources:   Identify barriers to discharge with patient and caregiver   Arrange for needed discharge resources and transportation as appropriate   Identify discharge learning needs (meds, wound care, etc)     Problem: Safety - Adult  Goal: Free from fall injury  Outcome: Progressing  Flowsheets (Taken 9/21/2022 2127)  Free From Fall Injury: Instruct family/caregiver on patient safety     Problem: ABCDS Injury Assessment  Goal: Absence of physical injury  Outcome: Progressing  Flowsheets (Taken 9/20/2022 0646 by Aimee Curiel RN)  Absence of Physical Injury: Implement safety measures based on patient assessment     Problem: Skin/Tissue Integrity  Goal: Absence of new skin breakdown  Description: 1. Monitor for areas of redness and/or skin breakdown  2. Assess vascular access sites hourly  3. Every 4-6 hours minimum:  Change oxygen saturation probe site  4. Every 4-6 hours:  If on nasal continuous positive airway pressure, respiratory therapy assess nares and determine need for appliance change or resting period. Outcome: Progressing  Note: No new skin breakdown noted throughout the shift.   Will continue to monitor       Problem: Pain  Goal: Verbalizes/displays adequate comfort level or baseline comfort level  Outcome: Progressing  Flowsheets (Taken 9/21/2022 2127)  Verbalizes/displays adequate comfort level or baseline comfort level:   Encourage patient to monitor pain and request assistance   Assess pain using appropriate pain scale   Administer analgesics based on type and severity of pain and evaluate response   Implement non-pharmacological measures as appropriate and evaluate response     Problem: Neurosensory - Adult  Goal: Achieves stable or improved neurological status  Outcome: Progressing  Flowsheets (Taken 9/21/2022 2127)  Achieves stable or improved neurological status: Assess for and report changes in neurological status     Problem: Neurosensory - Adult  Goal: Achieves maximal functionality and self care  Outcome: Progressing  Flowsheets (Taken 9/21/2022 2127)  Achieves maximal functionality and self care: Encourage and assist patient to increase activity and self care with guidance from physical therapy/occupational therapy     Problem: Respiratory - Adult  Goal: Achieves optimal ventilation and oxygenation  Outcome: Progressing  Flowsheets (Taken 9/21/2022 2127)  Achieves optimal ventilation and oxygenation:   Assess for changes in respiratory status   Assess for changes in mentation and behavior   Position to facilitate oxygenation and minimize respiratory effort   Oxygen supplementation based on oxygen saturation or arterial blood gases   Initiate smoking cessation protocol as indicated   Assess and instruct to report shortness of breath or any respiratory difficulty   Respiratory therapy support as indicated     Problem: Cardiovascular - Adult  Goal: Absence of cardiac dysrhythmias or at baseline  Outcome: Progressing  Flowsheets (Taken 9/21/2022 2127)  Absence of cardiac dysrhythmias or at baseline:   Monitor cardiac rate and rhythm   Assess for signs of decreased cardiac output     Problem: Musculoskeletal - Adult  Goal: Return mobility to safest level of function  Outcome: Progressing  Flowsheets (Taken 9/21/2022 2127)  Return Mobility to Safest Level of Function:   Assess patient stability and activity tolerance for standing, transferring and ambulating with or without assistive devices   Assist with transfers and ambulation using safe patient handling equipment as needed   Ensure adequate protection for wounds/incisions during mobilization   Obtain physical therapy/occupational therapy consults as needed     Problem: Musculoskeletal - Adult  Goal: Return ADL status to a safe level of function  Outcome: Progressing  Flowsheets (Taken 9/21/2022 2127)  Return ADL Status to a Safe Level of Function:   Administer medication as ordered   Assess activities of daily living deficits and provide assistive devices as needed   Obtain physical therapy/occupational therapy consults as needed   Assist and instruct patient to increase activity and self care as tolerated     Problem: Genitourinary - Adult  Goal: Absence of urinary retention  Outcome: Progressing  Flowsheets (Taken 9/21/2022 2127)  Absence of urinary retention:   Assess patients ability to void and empty bladder   Monitor intake/output and perform bladder scan as needed     Problem: Metabolic/Fluid and Electrolytes - Adult  Goal: Electrolytes maintained within normal limits  Outcome: Progressing  Flowsheets (Taken 9/21/2022 2127)  Electrolytes maintained within normal limits:   Monitor labs and assess patient for signs and symptoms of electrolyte imbalances   Administer electrolyte replacement as ordered   Monitor response to electrolyte replacements, including repeat lab results as appropriate     Problem: Metabolic/Fluid and Electrolytes - Adult  Goal: Hemodynamic stability and optimal renal function maintained  Outcome: Progressing  Flowsheets (Taken 9/21/2022 2127)  Hemodynamic stability and optimal renal function maintained:   Monitor labs and assess for signs and symptoms of volume excess or deficit   Monitor intake, output and patient weight   Monitor urine specific gravity, serum osmolarity and serum sodium as indicated or ordered   Monitor response to interventions for patient's volume status, including labs, urine output, blood pressure (other measures as available)     Problem: Metabolic/Fluid and Electrolytes - Adult  Goal: Glucose maintained within prescribed range  Outcome: Progressing  Flowsheets (Taken 9/21/2022 2127)  Glucose maintained within prescribed range:   Monitor blood glucose as ordered   Assess for signs and symptoms of hyperglycemia and hypoglycemia   Administer ordered medications to maintain glucose within target range

## 2022-09-22 NOTE — DISCHARGE SUMMARY
[facility]  Office: [address], DO, Samantha Clifford, DO, Shelli Bosch, DO, Marinaemory Suazo Blood, DO, Margarette Pierce MD, Eliezer Wheeler MD, Doug Simon MD, Montana Monterroso MD,  Tsering Garcia MD, Louie Bacon MD, Eugenio Vergara DO, Jam Knapp MD,  Matthew Pacheco MD, Pamela Bliss MD, Janessa Monae DO, Maurilio Hoover MD, Leti Louis MD, Gillian Anne MD, Kelly Milligan MD, Brayan Fitzgerald MD, Tiffany Thomas MD, Tamika Lee DO, Kuldeep Bray MD, Ni Grewal MD, Jordan Kaminski, CNP,  Yoseph Ace, CNP, Juliana Foster, CNP, Jared Mathur, CNP,  Cleone Denver, [facility], Ann Justin, [facility], Jourdan Malone, CNP, Yoon Boles, CNP, Jamal Lester, CNP, Taryn Mcclain, CNP, Jack Roldan PA-C, Carlos Israel, CNS, Yovana Hernandez, [facility], Joe Parra, CNP, Rafael Yanes, CNP, Judy Key, [facility]    Discharge Summary     Patient ID: Rosa Deleon  :  1945   MRN: [id number]     ACCOUNT:  [de-identified]   Patient's PCP: Guillermina Lockwood MD  Admit Date: 2022   Discharge Date: 2022     Length of Stay: 2  Code Status:  Full Code  Admitting Physician: No admitting provider for patient encounter. Discharge Physician: Eugenio Vergara DO     Active Discharge Diagnoses:     Hospital Problem Lists:  Principal Problem:    Acute kidney injury superimposed on CKD [facility])  Active Problems:    Coronary artery disease involving native coronary artery of native heart    Ischemic cardiomyopathy    Stage 3a chronic kidney disease ([facility]    Chronic atrial fibrillation ([facility]    Essential hypertension    [facility]    Multiple sclerosis ([facility]  Resolved Problems:    * No resolved hospital problems.  *      Admission Condition:  good     Discharged Condition: good    Hospital Stay:     Hospital Course:  From H&p  Rosaanthony Deleon is a 68 y.o. male with a past medical history for multiple sclerosis, urinary retention, CKD stage IIIa presents to the emergency department at [facility] for low urine output. Patient in the past has required urinary catheterization and thought that he may need this again. Patient's creatinine was found to be 5.2, patient was recently seen for ventricular tachycardia and was discharged on diuretics. Patient was transferred to [facility] for nephrology evaluation for dialysis. Patient previously treated for multiple sclerosis with IVIG until he developed anaphylaxis. Patient has been having issues with urinary retention as well. Patient was seen by both nephrology and urology service. Patient was started on IV fluids with improvement in acute kidney injury, eventually patient was told to restart diuretics at a lower dose and told to follow-up outpatient with nephrology.   Patient was also seen by urology, Rosas catheter was placed and told to follow-up outpatient with urology        Significant therapeutic interventions: none    Significant Diagnostic Studies:   Labs / Micro:  CBC:   Lab Results   Component Value Date/Time    WBC 7.5 09/19/2022 09:08 PM    RBC 4.05 09/19/2022 09:08 PM    HGB 11.2 09/19/2022 09:08 PM    HCT 33.8 09/19/2022 09:08 PM    MCV 83.4 09/19/2022 09:08 PM    MCH 27.5 09/19/2022 09:08 PM    MCHC 33.0 09/19/2022 09:08 PM    RDW 16.7 09/19/2022 09:08 PM     09/19/2022 09:08 PM     BMP:    Lab Results   Component Value Date/Time    GLUCOSE 80 09/22/2022 06:53 AM     09/22/2022 06:53 AM    K 4.1 09/22/2022 06:53 AM     09/22/2022 06:53 AM    CO2 21 09/22/2022 06:53 AM    ANIONGAP 11 09/22/2022 06:53 AM    BUN 38 09/22/2022 06:53 AM    CREATININE 2.93 09/22/2022 06:53 AM    BUNCRER 13 09/22/2022 06:53 AM    CALCIUM 8.0 09/22/2022 06:53 AM    LABGLOM 21 09/22/2022 06:53 AM    GFRAA 25 09/22/2022 06:53 AM    GFR      09/22/2022 06:53 AM     HFP:    Lab Results   Component Value Date/Time    PROT 5.0 09/22/2022 06:53 AM     CMP:    Lab Results   Component Value Date/Time    GLUCOSE 80 09/22/2022 06:53 AM     09/22/2022 06:53 AM    K 4.1 09/22/2022 06:53 AM     09/22/2022 06:53 AM    CO2 21 09/22/2022 06:53 AM    BUN 38 09/22/2022 06:53 AM    CREATININE 2.93 09/22/2022 06:53 AM    ANIONGAP 11 09/22/2022 06:53 AM    ALKPHOS 65 09/22/2022 06:53 AM    ALT 9 09/22/2022 06:53 AM    AST 9 09/22/2022 06:53 AM    BILITOT 0.2 09/22/2022 06:53 AM    LABALBU 2.7 09/22/2022 06:53 AM    ALBUMIN 1.3 09/19/2022 09:08 PM    LABGLOM 21 09/22/2022 06:53 AM    GFRAA 25 09/22/2022 06:53 AM    GFR      09/22/2022 06:53 AM    PROT 5.0 09/22/2022 06:53 AM    CALCIUM 8.0 09/22/2022 06:53 AM     PT/INR:    Lab Results   Component Value Date/Time    PROTIME 25.8 09/21/2022 07:36 AM    INR 2.4 09/21/2022 07:36 AM     PTT:   Lab Results   Component Value Date/Time    APTT 24.8 08/24/2022 08:29 AM     FLP:    Lab Results   Component Value Date/Time    CHOL 130 07/28/2022 01:28 AM    TRIG 94 07/28/2022 01:28 AM    HDL 33 07/28/2022 01:28 AM     U/A:    Lab Results   Component Value Date/Time    COLORU Yellow 09/19/2022 11:17 PM    TURBIDITY SLIGHTLY CLOUDY 09/19/2022 11:17 PM    SPECGRAV 1.010 09/19/2022 11:17 PM    HGBUR MODERATE 09/19/2022 11:17 PM    PHUR 6.0 09/19/2022 11:17 PM    PROTEINU TRACE 09/19/2022 11:17 PM    GLUCOSEU NEGATIVE 09/19/2022 11:17 PM    KETUA NEGATIVE 09/19/2022 11:17 PM    BILIRUBINUR NEGATIVE 09/19/2022 11:17 PM    UROBILINOGEN Normal 09/19/2022 11:17 PM    NITRU NEGATIVE 09/19/2022 11:17 PM    LEUKOCYTESUR LARGE 09/19/2022 11:17 PM     TSH:    Lab Results   Component Value Date/Time    TSH 1.75 09/20/2022 07:02 AM        Radiology:  XR ABDOMEN (KUB) (SINGLE AP VIEW)    Result Date: 9/19/2022  Nonobstructive bowel gas pattern with moderate volume stool. XR CHEST PORTABLE    Result Date: 9/19/2022  No acute process.      US RETROPERITONEAL LIMITED    Result Date: 9/20/2022  Limited exam. Left renal atrophy similar to previous. Right kidney measures small on this exam although this is likely related to measurement technique. Unremarkable urinary bladder. RECOMMENDATIONS: Unavailable       Consultations:    Consults:     Final Specialist Recommendations/Findings:   IP CONSULT TO NEPHROLOGY  IP CONSULT TO UROLOGY      The patient was seen and examined on day of discharge and this discharge summary is in conjunction with any daily progress note from day of discharge. Discharge plan:     Disposition: Home    Physician Follow Up:     [address] [place] [id number] [phone]  Follow up  Noris Wright MD  [address] [place] [address]  [phone]    Schedule an appointment as soon as possible for a visit in 1 week(s)  for further hair care, otherwise go to established urologist    Osmar Mcintyre MD  [address]  [address] [phone]    Schedule an appointment as soon as possible for a visit  recheck renal function in 1 week, go for 1 week followup       Requiring Further Evaluation/Follow Up POST HOSPITALIZATION/Incidental Findings: none    Diet: regular diet    Activity: As tolerated    Instructions to Patient: see pcp, urology outpatient. Followup on kidney function 1 week. Discharge Medications:      Medication List        START taking these medications      ampicillin 500 MG capsule  Commonly known as: PRINCIPEN  Take 1 capsule by mouth 4 times daily for 7 days     bisacodyl 10 MG suppository  Commonly known as: DULCOLAX  Place 1 suppository rectally daily  Start taking on: September 23, 2022     senna 8.6 MG tablet  Commonly known as: SENOKOT  Take 1 tablet by mouth nightly            CHANGE how you take these medications      Eliquis 5 MG Tabs tablet  Generic drug: apixaban  What changed: Another medication with the same name was removed.  Continue taking this medication, and follow the directions you see here. furosemide 20 MG tablet  Commonly known as: Lasix  Take 1 tablet by mouth daily  What changed:   medication strength  how much to take            CONTINUE taking these medications      albuterol sulfate  (90 Base) MCG/ACT inhaler  Commonly known as: PROVENTIL;VENTOLIN;PROAIR     amiodarone 200 MG tablet  Commonly known as: CORDARONE  Take 1 tablet by mouth daily     clopidogrel 75 MG tablet  Commonly known as: PLAVIX  Take 1 tablet by mouth daily     metoprolol succinate 50 MG extended release tablet  Commonly known as: TOPROL XL  Take 1 tablet by mouth daily     nitroGLYCERIN 0.4 MG SL tablet  Commonly known as: NITROSTAT     omeprazole 20 MG delayed release capsule  Commonly known as: PRILOSEC     Rosuvastatin Calcium 20 MG Cpsp     tamsulosin 0.4 MG capsule  Commonly known as: FLOMAX  Take 1 capsule by mouth nightly            STOP taking these medications      acetaminophen 325 MG tablet  Commonly known as: TYLENOL     ibuprofen 600 MG tablet  Commonly known as: IBU     lisinopril 5 MG tablet  Commonly known as: PRINIVIL;ZESTRIL     torsemide 20 MG tablet  Commonly known as: DEMADEX               Where to Get Your Medications        These medications were sent to [address] [place] [address], [address] [place] [address]      Phone: [phone]   ampicillin 500 MG capsule  bisacodyl 10 MG suppository  furosemide 20 MG tablet  senna 8.6 MG tablet         No discharge procedures on file. Time Spent on discharge is  37 mins in patient examination, evaluation, counseling as well as medication reconciliation, prescriptions for required medications, discharge plan and follow up. Electronically signed by   Ethan Francis DO  9/22/2022  4:54 PM      Thank you Dr. Katya Martinez MD for the opportunity to be involved in this patient's care.

## 2022-09-22 NOTE — PROGRESS NOTES
Pt discharged to home at this time. Pt off unit per personal motorized scooter; accompanied by his son. Pt took all of his personal belongings; IV access removed per [facility] Reeves.  Pt's son is taking him home. Pt off unit in stable condition.

## 2022-09-22 NOTE — PROGRESS NOTES
Dr. Kat Skelton, DO,    Your patient is on a medication that requires a renal dose adjustment. Renal Function Assessment:    Date Body Weight IBW  Adjusted BW SCr  CrCl Dialysis status   9/22/2022 145 lb (65.8 kg)  Ideal body weight: 70.7 kg (155 lb 13.8 oz) Serum creatinine: 2.93 mg/dL (H) 09/22/22 2621  Estimated creatinine clearance: 20 mL/min (A) N/a       Pharmacy has renally dose-adjusted the following medication(s):    Date Original Order Renally Adjusted Order   9/22/2022 Ampicillin 1000 mg IV every 4 hours Ampicillin 2000 mg IV every 12 hours       These changes were made per protocol according to the [facility] Renal Function-Based Dose Adjustments Policy    *Please note this dose may need readjusted if your patient's renal function significantly improves. Please contact pharmacy with any questions regarding these changes.     [facility]  9/22/2022  2:46 PM

## 2022-09-22 NOTE — CONSULTS
[facility]  Urology Consultation    Patient:  Marcia Sanders  MRN: [id number]  YOB: 1945    CHIEF COMPLAINT:  urinary retention, indwelling Rosas    HISTORY OF PRESENT ILLNESS:   The patient is a 68 y.o. male who presents with fatigue and weakness,acute kidney injury,bladder outlet obstruction, urology recommended to maintain the indwelling Rosas the present time  Patient has a history of MS  The patient has a history of chronic kidney disease stage III as well as coronary artery disease  The patient and creatinine is 1.2 but at the present time is markedly elevated  The patient has had more than one admission over the past 2-3 month and has had episodes of urinary retention  Patient's old records, notes and chart reviewed and summarized above.     Past Medical History:    Past Medical History:   Diagnosis Date    Abdominal aortic aneurysm without rupture (HCC)     Atrial flutter (HCC)     Chronic atrial fibrillation, unspecified (HCC)     Chronic kidney disease     Combined systolic and diastolic heart failure (HCC)     Diverticulosis large intestine w/o perforation or abscess w/bleeding     Hyperlipidemia     Hypotension     Ischemic cardiomyopathy     MI (myocardial infarction) ([address]     MS (multiple sclerosis) ([facility]     Multiple sclerosis ([facility]     Pacemaker, artificial     Urinary retention        Past Surgical History:    Past Surgical History:   Procedure Laterality Date    CORONARY ANGIOPLASTY WITH STENT PLACEMENT      PACEMAKER PLACEMENT      aicd     Previous  surgery:  chronic retention and neurogenic bladder with history of MS      Medications:    Scheduled Meds:   ampicillin IV  2,000 mg IntraVENous Q12H    bisacodyl  10 mg Rectal Daily    senna  1 tablet Oral Nightly    amiodarone  200 mg Oral Daily    apixaban  5 mg Oral BID    clopidogrel  75 mg Oral Daily    metoprolol succinate  50 mg Oral Daily    pantoprazole  40 mg Oral QAM AC    sodium chloride flush  5-40 mL IntraVENous 2 times per day    rosuvastatin  10 mg Oral Daily    polyethylene glycol  17 g Oral Daily     Continuous Infusions:   sodium chloride Stopped (09/22/22 1228)    sodium chloride       PRN Meds:.sodium chloride flush, sodium chloride, ondansetron **OR** ondansetron, acetaminophen **OR** acetaminophen    Allergies:  Codeine, Doxycycline, Erythromycin, Gammagard [immune globulin], and Sulfa antibiotics    Social History:    Social History     Socioeconomic History    Marital status:      Spouse name: Not on file    Number of children: Not on file    Years of education: Not on file    Highest education level: Not on file   Occupational History    Not on file   Tobacco Use    Smoking status: Former    Smokeless tobacco: Never   Substance and Sexual Activity    Alcohol use: No    Drug use: No    Sexual activity: Not on file   Other Topics Concern    Not on file   Social History Narrative    Not on file     Social Determinants of Health     Financial Resource Strain: Not on file   Food Insecurity: Not on file   Transportation Needs: Not on file   Physical Activity: Not on file   Stress: Not on file   Social Connections: Not on file   Intimate Partner Violence: Not on file   Housing Stability: Not on file       Family History:  No family history on file.   Previous Urologic Family history: none    REVIEW OF SYSTEMS:  Constitutional: negative  Eyes: negative  Respiratory: see history of present illness  Cardiovascular: coronary artery disease, see history of present illness  Gastrointestinal: negative  Genitourinary: see HPI  Musculoskeletal: patient with history of MS  Skin: negative   Neurological: see history of present illness  Hematological/Lymphatic: negative  Psychological: negative    Physical Exam:    This a 68 y.o. male   Patient Vitals for the past 24 hrs:   BP Temp Temp src Pulse Resp SpO2 Weight   09/22/22 1134 -- 98.7 °F (37.1 °C) Temporal -- -- -- --   09/22/22 0809 (!) 100/50 -- -- 65 -- -- --   09/22/22 1584 (!) 101/48 97.7 °F (36.5 °C) Oral 75 17 97 % --   09/22/22 0046 -- -- -- -- -- -- 145 lb (65.8 kg)   09/21/22 1934 (!) 100/42 97.7 °F (36.5 °C) Oral 60 16 99 % --     Constitutional: Patient in no acute distress; Neuro: alert and oriented to person place and time. Psych: Mood and affect normal.  Skin: Normal  Lungs: Respiratory effort normal  Cardiovascular:  Normal peripheral pulses  Abdomen: Soft, non-tender, non-distended with no CVA, flank pain, hepatosplenomegaly or hernia. Kidneys normal.  Bladder non-tender and not distended.   Lymphatics: no palpable lymphadenopathy  Penis normal and circumcised  Urethral meatus normal  Scrotal exam normal  Testicles normal bilaterally  Epididymis normal bilaterally  Rosas catheter in place, bladder decompressed,    We recommended to maintain the indwelling Rosas  LABS:  Recent Labs     09/19/22  2108   WBC 7.5   HGB 11.2*   HCT 33.8*   MCV 83.4        Recent Labs     09/20/22  0702 09/20/22  2101 09/21/22  0736 09/22/22  0653    139 143 142   K 3.2* 3.7 3.6* 4.1    104 110* 110*   CO2 24 25 21 21   PHOS 3.7  --   --   --    BUN 60* 55* 48* 38*   CREATININE 4.79* 4.40* 3.65* 2.93*     No results found for: PSA    Additional Lab/culture results:    Urinalysis:   Recent Labs     09/19/22  2317   COLORU Yellow   PHUR 6.0   WBCUA TOO NUMEROUS TO COUNT   RBCUA 20 TO 50   BACTERIA MANY*   SPECGRAV 1.010   LEUKOCYTESUR LARGE*   UROBILINOGEN Normal   BILIRUBINUR NEGATIVE        -----------------------------------------------------------------  Imaging Results:    Assessment and Plan   Impression:    Patient Active Problem List   Diagnosis    Hypotension    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 (HCC)    Coronary artery disease involving native coronary artery of native heart    Ischemic cardiomyopathy    Stage 3a chronic kidney disease ([facility]    Chronic atrial fibrillation ([facility]    NSTEMI (non-ST elevated myocardial infarction) [facility] Weakness    NSVT (nonsustained ventricular tachycardia) (HCC)    Combined systolic and diastolic cardiac dysfunction    Longstanding persistent atrial fibrillation (HCC)    Monomorphic ventricular tachycardia (HCC)    Fatigue    Stage 3b chronic kidney disease (HCC)    Essential hypertension    Acute kidney injury superimposed on CKD ([address]    AICD (automatic cardioverter/defibrillator) present    SANNA (acute kidney injury) ([address]    Multiple sclerosis ([address]       Plan: discussed with the patient, follow-up at the office, maintaining the indwelling Rosas, monitor renal function    Patient is scheduled for discharge today we will seen for follow-up in 2-3 weeks    Marie Suh MD  4:46 PM 9/22/2022

## 2022-09-22 NOTE — PROGRESS NOTES
Renal Progress Note    Patient :  Bhavin Teague; 68 y.o. MRN# [id number]  Location:  2002/2002-02  Attending:  Gavino Blanco DO  Admit Date:  9/20/2022   Hospital Day: 2      Subjective:     Urine output good with Rosas. Creatinine down to 2.9. Hemodynamically stable. Antibiotics continue for enterococcal UTI. Hemodynamically stable. Urology of consult obtained, to be discharged home with a Rosas. Denies any shortness of breath, PND, or apnea. Previous history reviewed  Known history of atrophic left kidney, chronic kidney disease stage III baseline 1.2-1.4 likely from chronic interstitial nephritis and ischemic nephrosclerosis. Does have ischemic cardiomyopathy with an ejection fraction of 35%. Previous history of multiple sclerosis has been intolerant to IVIG. Has had several admissions in the last few months with weakness and dehydration. Diuretics were adjusted last visit. He presented to the hospital with decreased urine output. Was hypotensive and had a creatinine of 5.3. With hydration and placement of Rosas creatinine is down to 2.9.     Outpatient Medications:     Medications Prior to Admission: apixaban (ELIQUIS) 5 MG TABS tablet, Take 5 mg by mouth 2 times daily Indications: for atrial fib  [DISCONTINUED] lisinopril (PRINIVIL;ZESTRIL) 5 MG tablet, Take 5 mg by mouth daily  metoprolol succinate (TOPROL XL) 50 MG extended release tablet, Take 1 tablet by mouth daily  amiodarone (CORDARONE) 200 MG tablet, Take 1 tablet by mouth daily  clopidogrel (PLAVIX) 75 MG tablet, Take 1 tablet by mouth daily  [DISCONTINUED] torsemide (DEMADEX) 20 MG tablet, Take 1 tablet by mouth daily  tamsulosin (FLOMAX) 0.4 MG capsule, Take 1 capsule by mouth nightly  albuterol sulfate HFA (PROVENTIL;VENTOLIN;PROAIR) 108 (90 Base) MCG/ACT inhaler, Inhale 2 puffs into the lungs every 6 hours as needed for Wheezing  Rosuvastatin Calcium 20 MG CPSP, Take 20 mg by mouth Daily  nitroGLYCERIN (NITROSTAT) 0.4 MG SL tablet, Place 0.4 mg under the tongue every 5 minutes as needed for Chest pain up to max of 3 total doses. If no relief after 1 dose, call 911. omeprazole (PRILOSEC) 20 MG delayed release capsule, Take 40 mg by mouth Daily  [DISCONTINUED] acetaminophen (TYLENOL) 325 MG tablet, Take 650 mg by mouth every 6 hours as needed for Pain    Current Medications:     Scheduled Meds:    ampicillin IV  2,000 mg IntraVENous Q12H    bisacodyl  10 mg Rectal Daily    senna  1 tablet Oral Nightly    amiodarone  200 mg Oral Daily    apixaban  5 mg Oral BID    clopidogrel  75 mg Oral Daily    metoprolol succinate  50 mg Oral Daily    pantoprazole  40 mg Oral QAM AC    sodium chloride flush  5-40 mL IntraVENous 2 times per day    rosuvastatin  10 mg Oral Daily    polyethylene glycol  17 g Oral Daily     Continuous Infusions:    sodium chloride Stopped (22 1228)    sodium chloride       PRN Meds:  sodium chloride flush, sodium chloride, ondansetron **OR** ondansetron, acetaminophen **OR** acetaminophen    Input/Output:       I/O last 3 completed shifts: In: 3604.8 [I.V.:3014.8; IV Piggyback:590]  Out: 1425 [LPGMU:6076]. Patient Vitals for the past 96 hrs (Last 3 readings):   Weight   22 0046 145 lb (65.8 kg)   22 0453 147 lb 6 oz (66.8 kg)   22 0632 147 lb 1.6 oz (66.7 kg)       Vital Signs:   Temperature:  Temp: 98.7 °F (37.1 °C)  TMax:   Temp (24hrs), Av °F (36.7 °C), Min:97.7 °F (36.5 °C), Max:98.7 °F (37.1 °C)    Respirations:  Resp: 17  Pulse:   Heart Rate: 65  BP:    BP: (!) 100/50  BP Range: Systolic (29HDZ), HCZ:885 , Min:100 , XTX:509       Diastolic (54DXX), YNO:92, Min:42, Max:50      Physical Examination:     General:  AAO x 3, speaking in full sentences, no accessory muscle use. HEENT: Atraumatic, normocephalic, no throat congestion, moist mucosa. Eyes:   Pupils equal, round and reactive to light, EOMI. Neck:   No JVD, no thyromegaly, no lymphadenopathy.   Chest:  Bilateral vesicular breath sounds, no rales or wheezes. Cardiac:  S1 S2 RR, no murmurs, gallops or rubs, JVP not raised. Abdomen: Soft, non-tender, no masses or organomegaly, BS audible. :   No suprapubic or flank tenderness. Neuro:  AAO x 3,   SKIN:  No rashes, good skin turgor. Extremities:  No edema, palpable peripheral pulses, no calf tenderness.   Motor weakness upper extremities and lower extremities noted,  Labs:       Recent Labs     09/19/22 2108   WBC 7.5   RBC 4.05*   HGB 11.2*   HCT 33.8*   MCV 83.4   MCH 27.5   MCHC 33.0   RDW 16.7*      MPV 7.9      BMP:   Recent Labs     09/20/22 2101 09/21/22  0736 09/22/22  0653    143 142   K 3.7 3.6* 4.1    110* 110*   CO2 25 21 21   BUN 55* 48* 38*   CREATININE 4.40* 3.65* 2.93*   GLUCOSE 106* 89 80   CALCIUM 8.6 8.1* 8.0*      Phosphorus:     Recent Labs     09/20/22  0702   PHOS 3.7     Magnesium:    Recent Labs     09/19/22 2108 09/20/22  0702 09/21/22  0736   MG 2.2 1.9 2.0     Albumin:    Recent Labs     09/19/22 2108 09/21/22  0736 09/22/22  0653   LABALBU 3.7 2.7* 2.7*     BNP:    No results found for: BNP  JANAE:      Lab Results   Component Value Date/Time    JANAE NEGATIVE 09/20/2022 11:51 AM     SPEP:  Lab Results   Component Value Date/Time    PROT 5.0 09/22/2022 06:53 AM    ALBCAL 3.1 08/22/2022 02:27 PM    ALBPCT 54 08/22/2022 02:27 PM    LABALPH 0.2 08/22/2022 02:27 PM    LABALPH 0.9 08/22/2022 02:27 PM    A1PCT 4 08/22/2022 02:27 PM    A2PCT 16 08/22/2022 02:27 PM    LABBETA 0.6 08/22/2022 02:27 PM    BETAPCT 11 08/22/2022 02:27 PM    GAMGLOB 0.9 08/22/2022 02:27 PM    GGPCT 16 08/22/2022 02:27 PM    PATH PENDING 09/20/2022 12:29 PM     UPEP:     Lab Results   Component Value Date/Time    LABPE PENDING 09/20/2022 12:29 PM     C3:     Lab Results   Component Value Date/Time    C3 119 09/20/2022 11:51 AM     C4:     Lab Results   Component Value Date/Time    C4 27 09/20/2022 11:51 AM     MPO ANCA:   No results found for: MPO  PR3 ANCA:   No results found for: PR3  Anti-GBM:   No results found for: GBMABIGG  Hep BsAg:         Lab Results   Component Value Date/Time    HEPBSAG NONREACTIVE 09/20/2022 11:51 AM     Hep C AB:          Lab Results   Component Value Date/Time    HEPCAB NONREACTIVE 09/20/2022 11:51 AM       Urinalysis/Chemistries:      Lab Results   Component Value Date/Time    NITRU NEGATIVE 09/19/2022 11:17 PM    COLORU Yellow 09/19/2022 11:17 PM    PHUR 6.0 09/19/2022 11:17 PM    WBCUA TOO NUMEROUS TO COUNT 09/19/2022 11:17 PM    RBCUA 20 TO 50 09/19/2022 11:17 PM    MUCUS 1+ 07/27/2022 05:00 PM    BACTERIA MANY 09/19/2022 11:17 PM    SPECGRAV 1.010 09/19/2022 11:17 PM    LEUKOCYTESUR LARGE 09/19/2022 11:17 PM    UROBILINOGEN Normal 09/19/2022 11:17 PM    BILIRUBINUR NEGATIVE 09/19/2022 11:17 PM    GLUCOSEU NEGATIVE 09/19/2022 11:17 PM    KETUA NEGATIVE 09/19/2022 11:17 PM    AMORPHOUS 1+ 07/27/2022 05:00 PM     Urine Sodium:     Lab Results   Component Value Date/Time    OBDULIO 65 09/20/2022 12:29 PM     Urine Potassium:  No results found for: KUR  Urine Chloride:    Lab Results   Component Value Date/Time    CLUR 52 09/20/2022 12:29 PM     Urine Osmolarity:   Lab Results   Component Value Date/Time    OSMOU 313 09/20/2022 12:29 PM     Urine Protein:   No components found for: TOTALPROTEIN, URINE   Urine Creatinine:     Lab Results   Component Value Date/Time    LABCREA 67.7 09/20/2022 12:29 PM    LABCREA 67.7 09/20/2022 12:29 PM    LABCREA 66.0 09/20/2022 12:29 PM     Urine Eosinophils:  No components found for: UEOS    Radiology:     CXR:     Assessment:     1. Acute Kidney Injury: Secondary to ischemic ATN from hypotension low flow related to urinary tract infection systemic inflammation response syndrome, suspected urinary obstruction given history of multiple sclerosis, has had these episodes several times in the past.  Is also mention of using ACE inhibitor's and ibuprofen. Baseline 1.2-1.4 peaked up to 5.5 now resolving  2.   Chronic kidney disease stage III secondary to ischemic nephrosclerosis and chronic interstitial nephritis Baseline 1.2-1.4 atrophic left kidney  3. Multiple sclerosis with motor deficits  4. 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 Ischemic cardiomyopathy    Plan:   1. Stable for discharge  2. Can resume Lasix 40 mg daily  3. Avoid ACE inhibitor's  4. Leave Rosas in on discharge and follow-up with his urologist  5. Check BMP 1 week after discharge  6. Antibiotics per primary service  7. Outpatient follow-up with nephrology as previously arranged    Nutrition   Please ensure that patient is on a renal diet/TF. Avoid nephrotoxic drugs/contrast exposure. We will continue to follow along with you.

## 2022-09-22 NOTE — PROGRESS NOTES
Physical Therapy  DATE: 2022    NAME: Adrienne Sharma  MRN: [id number]   : 1945    Patient not seen this date for Physical Therapy due to:      [] Cancel by RN or physician due to:    [] Hemodialysis    [] Critical Lab Value Level     [] Blood transfusion in progress    [] Acute or unstable cardiovascular status   _MAP < 55 or more than >115  _HR < 40 or > 130    [] Acute or unstable pulmonary status   -FiO2 > 60%   _RR < 5 or >40    _O2 sats < 85%    [] Strict Bedrest    [] Off Unit for surgery or procedure    [] Off Unit for testing       [] Pending imaging to R/O fracture    [x] Refusal by Patient:  Pt continuing to adamantly refuse to participate in therapy evaluation at this time stating \"Absolutely not. I'm tired and I have home care set up. \"  Attempted to educate pt on purpose of acute PT eval and importance of continued mobility however pt continuing to refuse & becoming agitated. PT will continue to follow and ck back as able. [] Other      [] PT being discontinued at this time. Patient independent. No further needs. [] PT being discontinued at this time as the patient has been transferred to hospice care. No further needs.       Caro Rendon, PT

## 2022-09-22 NOTE — PLAN OF CARE
Problem: Chronic Conditions and Co-morbidities  Goal: Patient's chronic conditions and co-morbidity symptoms are monitored and maintained or improved  Outcome: Progressing  Flowsheets (Taken 9/22/2022 0809)  Care Plan - Patient's Chronic Conditions and Co-Morbidity Symptoms are Monitored and Maintained or Improved:   Monitor and assess patient's chronic conditions and comorbid symptoms for stability, deterioration, or improvement   Collaborate with multidisciplinary team to address chronic and comorbid conditions and prevent exacerbation or deterioration     Problem: Discharge Planning  Goal: Discharge to home or other facility with appropriate resources  Outcome: Progressing  Flowsheets (Taken 9/22/2022 0809)  Discharge to home or other facility with appropriate resources:   Identify barriers to discharge with patient and caregiver   Arrange for needed discharge resources and transportation as appropriate     Problem: Safety - Adult  Goal: Free from fall injury  Outcome: Progressing     Problem: ABCDS Injury Assessment  Goal: Absence of physical injury  Outcome: Progressing     Problem: Skin/Tissue Integrity  Goal: Absence of new skin breakdown  Description: 1. Monitor for areas of redness and/or skin breakdown  2. Assess vascular access sites hourly  3. Every 4-6 hours minimum:  Change oxygen saturation probe site  4. Every 4-6 hours:  If on nasal continuous positive airway pressure, respiratory therapy assess nares and determine need for appliance change or resting period.   Outcome: Progressing     Problem: Pain  Goal: Verbalizes/displays adequate comfort level or baseline comfort level  Outcome: Progressing     Problem: Neurosensory - Adult  Goal: Achieves stable or improved neurological status  Outcome: Progressing  Flowsheets (Taken 9/22/2022 0809)  Achieves stable or improved neurological status:   Assess for and report changes in neurological status   Initiate measures to prevent increased intracranial pressure  Goal: Achieves maximal functionality and self care  Outcome: Progressing     Problem: Respiratory - Adult  Goal: Achieves optimal ventilation and oxygenation  Outcome: Progressing  Flowsheets (Taken 9/22/2022 0809)  Achieves optimal ventilation and oxygenation:   Assess for changes in respiratory status   Assess for changes in mentation and behavior     Problem: Cardiovascular - Adult  Goal: Absence of cardiac dysrhythmias or at baseline  Outcome: Progressing  Flowsheets (Taken 9/22/2022 0809)  Absence of cardiac dysrhythmias or at baseline:   Monitor cardiac rate and rhythm   Assess for signs of decreased cardiac output   Administer antiarrhythmia medication and electrolyte replacement as ordered     Problem: Musculoskeletal - Adult  Goal: Return mobility to safest level of function  Outcome: Progressing  Flowsheets (Taken 9/22/2022 0809)  Return Mobility to Safest Level of Function:   Assess patient stability and activity tolerance for standing, transferring and ambulating with or without assistive devices   Assist with transfers and ambulation using safe patient handling equipment as needed  Goal: Return ADL status to a safe level of function  Outcome: Progressing  Flowsheets (Taken 9/22/2022 0809)  Return ADL Status to a Safe Level of Function:   Administer medication as ordered   Assess activities of daily living deficits and provide assistive devices as needed     Problem: Genitourinary - Adult  Goal: Absence of urinary retention  Outcome: Progressing  Flowsheets (Taken 9/22/2022 0809)  Absence of urinary retention:   Assess patients ability to void and empty bladder   Monitor intake/output and perform bladder scan as needed     Problem: Metabolic/Fluid and Electrolytes - Adult  Goal: Electrolytes maintained within normal limits  Outcome: Progressing  Flowsheets (Taken 9/22/2022 0809)  Electrolytes maintained within normal limits:   Monitor labs and assess patient for signs and symptoms of electrolyte imbalances   Administer electrolyte replacement as ordered  Goal: Hemodynamic stability and optimal renal function maintained  Outcome: Progressing  Flowsheets (Taken 9/22/2022 0809)  Hemodynamic stability and optimal renal function maintained:   Monitor labs and assess for signs and symptoms of volume excess or deficit   Monitor intake, output and patient weight  Goal: Glucose maintained within prescribed range  Outcome: Progressing  Flowsheets (Taken 9/22/2022 0809)  Glucose maintained within prescribed range: Monitor blood glucose as ordered

## 2022-09-22 NOTE — CARE COORDINATION
Pt discharged and Natalie with [facility] informed. Waiting for [facility] authorization to accept. Will contact pt as soon as authorization obtained.

## 2022-09-22 NOTE — PROGRESS NOTES
Occupational [address]  Occupational Therapy Not Seen Note    Patient not available for Occupational Therapy due to:    [] Testing:    [] Hemodialysis    [] Cancelled by RN:    [x] Refusal by Patient: 9/22: Pt is adamantly refusing therapy at this time reporting, \"I am not doing therapy that's for sure! I am tired and already have home care. \" Attempted to educate pt on purpose of acute OT eval/benefits of therapy participation. Pt not receptive to education at this time. Will continue to follow.      [] Surgery:     [] Intubation:     [] Pain Medication:    [] Sedation:     [] Spine Precautions :    [] Medical Instability:    [] Other:      Namita Alonso, OT

## 2022-09-23 LAB
P E INTERPRETATION, U: NORMAL
PATHOLOGIST: NORMAL
SPECIMEN TYPE: NORMAL
URINE IFX INTERP: NORMAL
URINE IFX SPECIMEN: NORMAL
URINE TOTAL PROTEIN: 34 MG/DL
URINE TOTAL PROTEIN: 34 MG/DL

## 2022-09-27 ENCOUNTER — HOSPITAL ENCOUNTER (OUTPATIENT)
Age: 77
Setting detail: SPECIMEN
Discharge: HOME | End: 2022-09-27

## 2022-09-27 DIAGNOSIS — N17.9 AKI (ACUTE KIDNEY INJURY) (HCC): ICD-10-CM

## 2022-09-27 LAB
ANION GAP SERPL CALCULATED.3IONS-SCNC: 9 MMOL/L (ref 9–17)
ANION GAP SERPL CALCULATED.3IONS-SCNC: 9 MMOL/L (ref 9–17)
BUN BLDV-MCNC: 17 MG/DL (ref 8–23)
BUN BLDV-MCNC: 17 MG/DL (ref 8–23)
CALCIUM SERPL-MCNC: 8.8 MG/DL (ref 8.6–10.4)
CALCIUM SERPL-MCNC: 8.8 MG/DL (ref 8.6–10.4)
CHLORIDE BLD-SCNC: 109 MMOL/L (ref 98–107)
CHLORIDE BLD-SCNC: 109 MMOL/L (ref 98–107)
CO2: 25 MMOL/L (ref 20–31)
CO2: 25 MMOL/L (ref 20–31)
CREAT SERPL-MCNC: 1.84 MG/DL (ref 0.7–1.2)
CREAT SERPL-MCNC: 1.84 MG/DL (ref 0.7–1.2)
GFR AFRICAN AMERICAN: 43 ML/MIN
GFR AFRICAN AMERICAN: 43 ML/MIN
GFR NON-AFRICAN AMERICAN: 36 ML/MIN
GFR NON-AFRICAN AMERICAN: 36 ML/MIN
GFR SERPL CREATININE-BSD FRML MDRD: ABNORMAL ML/MIN/{1.73_M2}
GFR SERPL CREATININE-BSD FRML MDRD: ABNORMAL ML/MIN/{1.73_M2}
GLUCOSE BLD-MCNC: 118 MG/DL (ref 70–99)
GLUCOSE BLD-MCNC: 118 MG/DL (ref 70–99)
MAGNESIUM: 1.9 MG/DL (ref 1.6–2.6)
POTASSIUM SERPL-SCNC: 3.6 MMOL/L (ref 3.7–5.3)
POTASSIUM SERPL-SCNC: 3.6 MMOL/L (ref 3.7–5.3)
SODIUM BLD-SCNC: 143 MMOL/L (ref 135–144)
SODIUM BLD-SCNC: 143 MMOL/L (ref 135–144)

## 2022-09-28 PROBLEM — N13.9 OBSTRUCTIVE UROPATHY: Status: ACTIVE | Noted: 2022-09-28

## 2022-10-09 ENCOUNTER — HOSPITAL ENCOUNTER (EMERGENCY)
Age: 77
Discharge: HOME | End: 2022-10-09
Attending: EMERGENCY MEDICINE
Payer: OTHER GOVERNMENT

## 2022-10-09 ENCOUNTER — APPOINTMENT (OUTPATIENT)
Dept: CT IMAGING | Age: 77
End: 2022-10-09
Payer: OTHER GOVERNMENT

## 2022-10-09 VITALS
SYSTOLIC BLOOD PRESSURE: 122 MMHG | DIASTOLIC BLOOD PRESSURE: 59 MMHG | TEMPERATURE: 97.7 F | HEART RATE: 84 BPM | OXYGEN SATURATION: 97 % | HEIGHT: 69 IN | BODY MASS INDEX: 21.41 KG/M2 | RESPIRATION RATE: 16 BRPM

## 2022-10-09 DIAGNOSIS — K59.00 CONSTIPATION, UNSPECIFIED CONSTIPATION TYPE: Primary | ICD-10-CM

## 2022-10-09 LAB
ABSOLUTE EOS #: 0.1 K/UL (ref 0–0.4)
ABSOLUTE LYMPH #: 0.8 K/UL (ref 1–4.8)
ABSOLUTE MONO #: 0.6 K/UL (ref 0.1–1.2)
ALBUMIN SERPL-MCNC: 3.5 G/DL (ref 3.5–5.2)
ALBUMIN/GLOBULIN RATIO: 1.1 (ref 1–2.5)
ALP BLD-CCNC: 80 U/L (ref 40–129)
ALT SERPL-CCNC: 11 U/L (ref 5–41)
ANION GAP SERPL CALCULATED.3IONS-SCNC: 12 MMOL/L (ref 9–17)
AST SERPL-CCNC: 16 U/L
BASOPHILS # BLD: 1 % (ref 0–2)
BASOPHILS ABSOLUTE: 0 K/UL (ref 0–0.2)
BILIRUB SERPL-MCNC: 0.4 MG/DL (ref 0.3–1.2)
BUN BLDV-MCNC: 29 MG/DL (ref 8–23)
CALCIUM SERPL-MCNC: 8.7 MG/DL (ref 8.6–10.4)
CHLORIDE BLD-SCNC: 104 MMOL/L (ref 98–107)
CO2: 24 MMOL/L (ref 20–31)
CREAT SERPL-MCNC: 1.69 MG/DL (ref 0.7–1.2)
EOSINOPHILS RELATIVE PERCENT: 1 % (ref 1–4)
GFR SERPL CREATININE-BSD FRML MDRD: 41 ML/MIN/1.73M2
GLUCOSE BLD-MCNC: 114 MG/DL (ref 70–99)
HCT VFR BLD CALC: 33.6 % (ref 41–53)
HEMOGLOBIN: 10.9 G/DL (ref 13.5–17.5)
LYMPHOCYTES # BLD: 14 % (ref 24–44)
MCH RBC QN AUTO: 27.9 PG (ref 26–34)
MCHC RBC AUTO-ENTMCNC: 32.5 G/DL (ref 31–37)
MCV RBC AUTO: 86.1 FL (ref 80–100)
MONOCYTES # BLD: 11 % (ref 2–11)
PDW BLD-RTO: 19.4 % (ref 12.5–15.4)
PLATELET # BLD: 345 K/UL (ref 140–450)
PMV BLD AUTO: 7.3 FL (ref 6–12)
POTASSIUM SERPL-SCNC: 3.8 MMOL/L (ref 3.7–5.3)
RBC # BLD: 3.9 M/UL (ref 4.5–5.9)
REASON FOR REJECTION: NORMAL
REASON FOR REJECTION: NORMAL
SEG NEUTROPHILS: 73 % (ref 36–66)
SEGMENTED NEUTROPHILS ABSOLUTE COUNT: 4.3 K/UL (ref 1.8–7.7)
SODIUM BLD-SCNC: 140 MMOL/L (ref 135–144)
TOTAL PROTEIN: 6.7 G/DL (ref 6.4–8.3)
WBC # BLD: 5.9 K/UL (ref 3.5–11)
ZZ NTE CLEAN UP: ORDERED TEST: NORMAL
ZZ NTE CLEAN UP: ORDERED TEST: NORMAL
ZZ NTE WITH NAME CLEAN UP: SPECIMEN SOURCE: NORMAL
ZZ NTE WITH NAME CLEAN UP: SPECIMEN SOURCE: NORMAL

## 2022-10-09 PROCEDURE — 36415 COLL VENOUS BLD VENIPUNCTURE: CPT

## 2022-10-09 PROCEDURE — 6370000000 HC RX 637 (ALT 250 FOR IP): Performed by: EMERGENCY MEDICINE

## 2022-10-09 PROCEDURE — 74176 CT ABD & PELVIS W/O CONTRAST: CPT

## 2022-10-09 PROCEDURE — 85025 COMPLETE CBC W/AUTO DIFF WBC: CPT

## 2022-10-09 PROCEDURE — 99284 EMERGENCY DEPT VISIT MOD MDM: CPT

## 2022-10-09 PROCEDURE — 80053 COMPREHEN METABOLIC PANEL: CPT

## 2022-10-09 RX ORDER — ACETAMINOPHEN 500 MG
1000 TABLET ORAL ONCE
Status: COMPLETED | OUTPATIENT
Start: 2022-10-09 | End: 2022-10-09

## 2022-10-09 RX ORDER — SODIUM PHOSPHATE, DIBASIC AND SODIUM PHOSPHATE, MONOBASIC 7; 19 G/133ML; G/133ML
1 ENEMA RECTAL
Qty: 1 EACH | Refills: 0 | Status: SHIPPED | OUTPATIENT
Start: 2022-10-09 | End: 2022-10-09

## 2022-10-09 RX ORDER — 0.9 % SODIUM CHLORIDE 0.9 %
1000 INTRAVENOUS SOLUTION INTRAVENOUS ONCE
Status: DISCONTINUED | OUTPATIENT
Start: 2022-10-09 | End: 2022-10-09 | Stop reason: HOSPADM

## 2022-10-09 RX ORDER — LACTULOSE 10 G/15ML
20 SOLUTION ORAL ONCE
Status: COMPLETED | OUTPATIENT
Start: 2022-10-09 | End: 2022-10-09

## 2022-10-09 RX ADMIN — LACTULOSE 20 G: 20 SOLUTION ORAL at 15:09

## 2022-10-09 RX ADMIN — ACETAMINOPHEN 1000 MG: 500 TABLET ORAL at 11:45

## 2022-10-09 ASSESSMENT — ENCOUNTER SYMPTOMS
SORE THROAT: 0
ABDOMINAL PAIN: 1
VOMITING: 0
RECTAL PAIN: 1
SHORTNESS OF BREATH: 0
DIARRHEA: 0

## 2022-10-09 ASSESSMENT — PAIN SCALES - GENERAL: PAINLEVEL_OUTOF10: 8

## 2022-10-09 ASSESSMENT — PAIN - FUNCTIONAL ASSESSMENT: PAIN_FUNCTIONAL_ASSESSMENT: 0-10

## 2022-10-09 NOTE — ED NOTES
Soap suds enema given at 1335. Approximately 600 mL able to be instilled.      De Porter RN  10/09/22 1156

## 2022-10-09 NOTE — ED PROVIDER NOTES
HISTORY     Past Medical History:   Diagnosis Date    Abdominal aortic aneurysm without rupture     Atrial flutter (HCC)     Chronic atrial fibrillation, unspecified (HCC)     Chronic kidney disease     Combined systolic and diastolic heart failure (HCC)     Diverticulosis large intestine w/o perforation or abscess w/bleeding     Hyperlipidemia     Hypotension     Ischemic cardiomyopathy     MI (myocardial infarction) ([facility]     MS (multiple sclerosis) (HCC)     Multiple sclerosis ([facility]     Pacemaker, artificial     Urinary retention          SURGICAL HISTORY       Past Surgical History:   Procedure Laterality Date    CORONARY ANGIOPLASTY WITH STENT PLACEMENT      PACEMAKER PLACEMENT      aicd         CURRENT MEDICATIONS     Previous Medications    ALBUTEROL SULFATE HFA (PROVENTIL;VENTOLIN;PROAIR) 108 (90 BASE) MCG/ACT INHALER    Inhale 2 puffs into the lungs every 6 hours as needed for Wheezing    AMIODARONE (CORDARONE) 200 MG TABLET    Take 1 tablet by mouth daily    APIXABAN (ELIQUIS) 5 MG TABS TABLET    Take 5 mg by mouth 2 times daily Indications: for atrial fib    BISACODYL (DULCOLAX) 10 MG SUPPOSITORY    Place 1 suppository rectally daily    CLOPIDOGREL (PLAVIX) 75 MG TABLET    Take 1 tablet by mouth daily    FUROSEMIDE (LASIX) 20 MG TABLET    Take 1 tablet by mouth daily    METOPROLOL SUCCINATE (TOPROL XL) 25 MG EXTENDED RELEASE TABLET    Take 2 tablets by mouth daily    NITROGLYCERIN (NITROSTAT) 0.4 MG SL TABLET    Place 0.4 mg under the tongue every 5 minutes as needed for Chest pain up to max of 3 total doses. If no relief after 1 dose, call 911.     OMEPRAZOLE (PRILOSEC) 20 MG DELAYED RELEASE CAPSULE    Take 40 mg by mouth Daily    ROSUVASTATIN CALCIUM 20 MG CPSP    Take 20 mg by mouth Daily    SENNA (SENOKOT) 8.6 MG TABLET    Take 1 tablet by mouth nightly    TAMSULOSIN (FLOMAX) 0.4 MG CAPSULE    Take 1 capsule by mouth nightly       ALLERGIES     Codeine, Doxycycline, Erythromycin, Gammagard [immune globulin], and Sulfa antibiotics    FAMILY HISTORY     History reviewed. No pertinent family history. SOCIAL HISTORY       Social History     Socioeconomic History    Marital status:      Spouse name: None    Number of children: None    Years of education: None    Highest education level: None   Tobacco Use    Smoking status: Former    Smokeless tobacco: Never   Substance and Sexual Activity    Alcohol use: No    Drug use: No       SCREENINGS             PHYSICAL EXAM    (up to 7 for level 4, 8 or more for level 5)     ED Triage Vitals [10/09/22 1030]   BP Temp Temp Source Heart Rate Resp SpO2 Height Weight   (!) 122/59 97.7 °F (36.5 °C) Oral 84 16 97 % 5' 9\" (1.753 m) --       Physical Exam  Vitals and nursing note reviewed. Constitutional:       General: He is not in acute distress. Appearance: Normal appearance. He is not ill-appearing or toxic-appearing. HENT:      Head: Normocephalic and atraumatic. Nose: Nose normal. No congestion. Mouth/Throat:      Mouth: Mucous membranes are moist.   Eyes:      General:         Right eye: No discharge. Left eye: No discharge. Conjunctiva/sclera: Conjunctivae normal.   Cardiovascular:      Rate and Rhythm: Normal rate and regular rhythm. Pulses: Normal pulses. Heart sounds: Normal heart sounds. No murmur heard. Pulmonary:      Effort: Pulmonary effort is normal. No respiratory distress. Breath sounds: Normal breath sounds. No wheezing. Abdominal:      General: Abdomen is flat. There is no distension. Palpations: Abdomen is soft. There is no pulsatile mass. Tenderness: no abdominal tenderness There is no guarding or rebound. Comments: No pulsatile mass   Musculoskeletal:         General: No deformity or signs of injury. Normal range of motion. Cervical back: Normal range of motion. Right lower leg: Edema present. Left lower leg: Edema present. Skin:     General: Skin is warm and dry. Capillary Refill: Capillary refill takes less than 2 seconds. Findings: No rash. Neurological:      General: No focal deficit present. Mental Status: He is alert. Mental status is at baseline. Motor: No weakness. Comments: Speaking normally. No facial asymmetry. Moving all 4 extremities. Normal gait. Psychiatric:         Mood and Affect: Mood normal.       EMERGENCY DEPARTMENT COURSE and DIFFERENTIAL DIAGNOSIS/MDM:   Vitals:    Vitals:    10/09/22 1030   BP: (!) 122/59   Pulse: 84   Resp: 16   Temp: 97.7 °F (36.5 °C)   TempSrc: Oral   SpO2: 97%   Height: 5' 9\" (1.753 m)       Patient presents to the emergency department with the complaint described above. Vital signs were grossly normal and he is nontoxic. Review of the medical record shows that when he was admitted to the hospital back in September he had a creatinine above 4. It had improved over the course of the week to 1.84 and he was following up with nephrology. I do have some concerns today that he is back in acute renal failure. This would be evidenced by decreased urinary output as well as his edema. The dehydration could also be contributing to constipation.   I am going to get a CT of the abdomen and pelvis to rule out obstruction, free air, or other obvious abnormalities, will get full metabolic work-up and will reevaluate      DIAGNOSTIC RESULTS     LABS:  Labs Reviewed   CBC WITH AUTO DIFFERENTIAL - Abnormal; Notable for the following components:       Result Value    RBC 3.90 (*)     Hemoglobin 10.9 (*)     Hematocrit 33.6 (*)     RDW 19.4 (*)     Seg Neutrophils 73 (*)     Lymphocytes 14 (*)     Absolute Lymph # 0.80 (*)     All other components within normal limits   COMPREHENSIVE METABOLIC PANEL - Abnormal; Notable for the following components:    Glucose 114 (*)     BUN 29 (*)     Creatinine 1.69 (*)     Est, Glom Filt Rate 41 (*)     All other components within normal limits   SPECIMEN REJECTION   SPECIMEN REJECTION   URINALYSIS WITH REFLEX TO CULTURE   PREVIOUS SPECIMEN       All other labs were within normal range or not returned as of this dictation. RADIOLOGY:  CT ABDOMEN PELVIS WO CONTRAST Additional Contrast? None   Final Result   1. Large volume stool in the rectum. 2. Infrarenal abdominal aortic aneurysm measuring 3.2 cm. Recommendation   below. RECOMMENDATIONS:   3.2 cm infrarenal abdominal aortic aneurysm. Recommend follow-up every 3   years. Reference: J Am John Radiol 4221;[id number]. ED Course as of 10/09/22 1501   Sun Oct 09, 2022   1312 Comprehensive metabolic panel reveals that his kidney function is actually at his baseline still with a creatinine of 1.69. His CBC reveals a stable chronic anemia. [TS]   0457 CT scan abdomen and pelvis shows large volume stool in the rectum as well as a AAA which will require follow-up every 3 years [TS]   2507 Patient did get rectal disimpaction by the physician assistant in the ED, also give some lactulose, he had some small bowel movements. He states he has a fleets enema at home, I am going to discharge him with prescription for magnesium citrate and fleets enema and I am recommending PCP follow-up    At this time the patient is without objective evidence of an acute process requiring hospitalization or inpatient management. They have remained hemodynamically stable and are stable for discharge with outpatient follow-up. Standard anticipatory guidance given to patient upon discharge. Have given them a specific time frame in which to follow-up and who to follow-up with. I have also advised them that they should return to the emergency department if they get worse, or not getting better or develop any new or concerning symptoms.   Patient demonstrates understanding.   [TS]      ED Course User Index  [TS] Kinjal Becker DO         PROCEDURES:  Unless otherwise noted below, none     Procedures    FINAL IMPRESSION      1. Constipation, unspecified constipation type          DISPOSITION/PLAN   DISPOSITION Discharge - Pending Orders Complete 10/09/2022 03:00:30 PM      PATIENT REFERRED TO:  Bindu Vaughn MD  [id number] [address] [facility],[address]  [facility] [phone]    In 3 days      DISCHARGE MEDICATIONS:  New Prescriptions    MAGNESIUM CITRATE SOLUTION    Take 296 mLs by mouth once for 1 dose    SODIUM PHOSPHATES (FLEET) 7-19 GM/118ML    Place 1 enema rectally once as needed (constipation)          (Please note that portions of this note were completed with a voice recognition program.  Efforts were made to edit the dictations but occasionally words are mis-transcribed.)    Leticia Centeno DO,(electronically signed)  Board Certified Emergency Physician          Leticia Centeno DO  10/09/22 [id number]

## 2022-10-12 ENCOUNTER — HOSPITAL ENCOUNTER (OUTPATIENT)
Age: 77
Setting detail: OUTPATIENT SURGERY
Discharge: HOME | End: 2022-10-12
Attending: UROLOGY | Admitting: UROLOGY
Payer: MEDICARE

## 2022-10-12 VITALS
BODY MASS INDEX: 23.7 KG/M2 | RESPIRATION RATE: 14 BRPM | HEIGHT: 69 IN | TEMPERATURE: 96.8 F | WEIGHT: 160 LBS | DIASTOLIC BLOOD PRESSURE: 61 MMHG | HEART RATE: 79 BPM | SYSTOLIC BLOOD PRESSURE: 128 MMHG | OXYGEN SATURATION: 97 %

## 2022-10-12 DIAGNOSIS — R33.9 RETENTION OF URINE: ICD-10-CM

## 2022-10-12 LAB
BILIRUBIN URINE: NEGATIVE
COLOR: YELLOW
EPITHELIAL CELLS UA: NORMAL /HPF (ref 0–5)
GLUCOSE URINE: NEGATIVE
KETONES, URINE: NEGATIVE
LEUKOCYTE ESTERASE, URINE: NEGATIVE
NITRITE, URINE: NEGATIVE
PH UA: 6 (ref 5–8)
PROTEIN UA: NEGATIVE
RBC UA: NORMAL /HPF (ref 0–2)
SPECIFIC GRAVITY UA: 1.01 (ref 1–1.03)
TURBIDITY: CLEAR
URINE HGB: ABNORMAL
UROBILINOGEN, URINE: NORMAL
WBC UA: NORMAL /HPF (ref 0–5)

## 2022-10-12 PROCEDURE — 3600000002 HC SURGERY LEVEL 2 BASE: Performed by: UROLOGY

## 2022-10-12 PROCEDURE — 7100000011 HC PHASE II RECOVERY - ADDTL 15 MIN: Performed by: UROLOGY

## 2022-10-12 PROCEDURE — 81001 URINALYSIS AUTO W/SCOPE: CPT

## 2022-10-12 PROCEDURE — 3600000012 HC SURGERY LEVEL 2 ADDTL 15MIN: Performed by: UROLOGY

## 2022-10-12 PROCEDURE — 6370000000 HC RX 637 (ALT 250 FOR IP): Performed by: UROLOGY

## 2022-10-12 PROCEDURE — 7100000010 HC PHASE II RECOVERY - FIRST 15 MIN: Performed by: UROLOGY

## 2022-10-12 PROCEDURE — 2709999900 HC NON-CHARGEABLE SUPPLY: Performed by: UROLOGY

## 2022-10-12 RX ORDER — CEPHALEXIN 500 MG/1
500 CAPSULE ORAL 3 TIMES DAILY
Qty: 15 CAPSULE | Refills: 0 | Status: SHIPPED | OUTPATIENT
Start: 2022-10-12 | End: 2022-10-17

## 2022-10-12 RX ORDER — LIDOCAINE HYDROCHLORIDE 20 MG/ML
JELLY TOPICAL PRN
Status: DISCONTINUED | OUTPATIENT
Start: 2022-10-12 | End: 2022-10-12 | Stop reason: ALTCHOICE

## 2022-10-12 ASSESSMENT — PAIN - FUNCTIONAL ASSESSMENT: PAIN_FUNCTIONAL_ASSESSMENT: 0-10

## 2022-10-12 NOTE — H&P
Interval H&P Note    Pt Name: Krystin Lerner  MRN: [id number]  YOB: 1945  Date of evaluation: 10/12/2022      [x] I have reviewed in [facility] the nephrology progress note by Dr. Favio Dumont dated 9/28/2022 for an Interval History and Physical note. [x] I have examined  Krystin Lerner  There are no changes to the patient who is scheduled for CYSTOSCOPY DILATATION UROFLOW by Ludmila Woods MD for Retention of urine [R33.9]. The patient denies new health changes, fever, chills, wheezing, cough, increased SOB, chest pain, open sores or wounds. Denies hx diabetes. Last Eliquis 10/12/2022. Vital signs: /62   Pulse 65   Temp 97.3 °F (36.3 °C)   Resp 20   Ht 5' 9\" (1.753 m)   Wt 160 lb (72.6 kg)   SpO2 97%   BMI 23.63 kg/m²     Allergies:  Codeine, Doxycycline, Erythromycin, Gammagard [immune globulin], and Sulfa antibiotics    Medications:    Prior to Admission medications    Medication Sig Start Date End Date Taking?  Authorizing Provider   magnesium citrate solution Take 296 mLs by mouth once for 1 dose 10/9/22 10/9/22  Ashu Stahl,    metoprolol succinate (TOPROL XL) 25 MG extended release tablet Take 2 tablets by mouth daily 9/28/22   Tiffany Garcia MD   furosemide (LASIX) 20 MG tablet Take 1 tablet by mouth daily 9/22/22   Simba Velarde DO   bisacodyl (DULCOLAX) 10 MG suppository Place 1 suppository rectally daily  Patient not taking: Reported on 10/12/2022 9/23/22 10/23/22  Simba Velarde DO   senna (SENOKOT) 8.6 MG tablet Take 1 tablet by mouth nightly 9/22/22 10/22/22  Simba Velarde DO   apixaban (ELIQUIS) 5 MG TABS tablet Take 5 mg by mouth 2 times daily Indications: for atrial fib    Historical Provider, MD   amiodarone (CORDARONE) 200 MG tablet Take 1 tablet by mouth daily 9/1/22   Shashank Hdez PA-C   clopidogrel (PLAVIX) 75 MG tablet Take 1 tablet by mouth daily 8/25/22   SHA[facility]) 20 MG tablet Take 1 tablet by mouth daily 8/25/22 9/22/22  Jenna Quezada SHAWN Hansen   tamsulosin (FLOMAX) 0.4 MG capsule Take 1 capsule by mouth nightly 7/30/22   Martínez Garnett MD   albuterol sulfate HFA (PROVENTIL;VENTOLIN;PROAIR) 108 (90 Base) MCG/ACT inhaler Inhale 2 puffs into the lungs every 6 hours as needed for Wheezing    Historical Provider, MD   Rosuvastatin Calcium 20 MG CPSP Take 20 mg by mouth Daily    Historical Provider, MD   nitroGLYCERIN (NITROSTAT) 0.4 MG SL tablet Place 0.4 mg under the tongue every 5 minutes as needed for Chest pain up to max of 3 total doses. If no relief after 1 dose, call 911. Historical Provider, MD   omeprazole (PRILOSEC) 20 MG delayed release capsule Take 40 mg by mouth Daily    Historical Provider, MD   acetaminophen (TYLENOL) 325 MG tablet Take 650 mg by mouth every 6 hours as needed for Pain  9/22/22  Historical Provider, MD   ibuprofen (IBU) 600 MG tablet Take 1 tablet by mouth every 6 hours as needed for Pain 6/25/22 7/30/22  Juancarlos Romo MD         This is a 68 y.o. male who is pleasant, cooperative, alert and oriented x3, in no acute distress. Heart: Heart sounds are normal. Left upper chest AICD. HR 65 regular rate and rhythm without murmur, gallop or rub. Lungs: Normal respiratory effort with equal expansion, good air exchange, unlabored and clear to auscultation without wheezes or rales bilaterally   Abdomen: soft, nontender, nondistended with bowel sounds active. Extremities: 4+ pitting edema bilateral lower extremities. Pedal pulses faint but palpable. Scabbed lesions on left toes.        Labs:  Recent Labs     10/09/22  1120 09/22/22  0653 09/21/22  0736   HGB 10.9*  --   --    HCT 33.6*  --   --    WBC 5.9  --   --    MCV 86.1  --   --      --   --       < > 143   K 3.8   < > 3.6*      < > 110*   CO2 24   < > 21   BUN 29*   < > 48*   CREATININE 1.69*   < > 3.65*   GLUCOSE 114*   < > 89   INR  --   --  2.4   PROTIME  --   --  25.8*   AST 16   < > 9   ALT 11   < > 9   LABALBU 3.5   < > 2.7*    < > = values in this interval not displayed. No results for input(s): COVID19 in the last 720 hours. Osvaldo Montelongo, SAVANNA - CNP  Electronically signed 10/12/2022 at 1:33 PM       Tiffany Garcia MD   Physician   Specialty:  Nephrology   Progress Notes      Signed   Encounter Date:  9/28/2022          Related encounter: Office Visit from 9/28/2022 in [facility] [address] All Collapse All     Nephrology Progress Note     Rica Lovelace MD        SUBJECTIVE           Chief Complaint   Patient presents with    Follow-up      9/28/22 (Office): Patient comes into the office for follow-up. He was seen by me during his hospitalization to [facility]. [facility] in August 2022. He has known history of multiple sclerosis, solitary functioning right kidney, ischemic cardiomyopathy ejection fraction of 25%, coronary artery disease having undergone stent placement to RCA in August 2022. His baseline creatinine at that time was in the 1.2-1.4 range. He had presented with generalized weakness and AICD device firing initially to [facility], then was found to have sustained non-ST elevation MI and was transferred to [facility]. [place]. Underwent cardiac catheterization as described above and stent placement to RCA. He developed mild nephrotoxic ATN creatinine peaked around 1.7. And was discharged home. He represented about 2 to 3 weeks later with complaints of decreased urine output. He was found to have a creatinine of 5.1 and was then admitted to [facility]. Was seen by Dr. Xander Walden my partner. Was found to have urinary retention, Rosas catheter was placed with good results. Creatinine improved to 1.8 on discharge. States that he seen his cardiologist, was complaining of swelling in his upper and lower extremities, his Lasix dose was increased to 20 twice daily. His metoprolol dose was cut down due to hypotension. His swelling has improved.   Symptom wise he denies any complaints, Rosas catheter in place and is planning to see urology tomorrow, ultimate decision about Rosas will be based on their recommendations. Labs 9/27/2022: Sodium 143 potassium 3.6 creatinine 1.8 calcium 8.8        [facility]; 68 y.o. male with past medical history as below presented to the hospital with the chief complaint of to the emergency department for evaluation of decreased urine output. Patient denied any nausea vomiting or diarrhea, does have some recent swelling in feet and ankles. He was found to have hypertension and did take Midodrine about 3 days ago. Patient had 2 admissions in the last couple of months and in August he did get cardiac catheterization with stent placement x2 as JOHANNA.  Patient does report remote history of urinary retention. Patient mentions that he does have an upcoming appointment this month with Dr. Favio Dumont. Lab work on presentation showed BUN 60, creatinine of 5.16 mg/DL, sodium 131, potassium 3.3, chloride 89, bicarb 24, calcium 9.0, magnesium 2.2. UA was done which was positive for trace protein, large leuk esterase, WBC too many to count, RBC 20-50 many bacteria. Chest x-ray was negative. KUB was done on 9/19/2022 which showed nonobstructed bowel gas pattern with moderate volume stool. Patient has been having some hypotension with systolics in mid 26M. Patient's baseline creatinine seems to be around 1.2-1.4 mg/dl. As per last renal ultrasound done on 8/22/2022 patient does seem to have atrophied left kidney with size of 7 cm and right kidney 11.5 cm. No hydronephrosis. Increased echogenicity of the left renal cortex which can be seen with chronic medical renal disease. Renal serology was checked in August 2022 which showed SPEP and immunofixation positive for IgM kappa. In the ED patient received a fluid bolus and was initiated on maintenance fluids of normal saline 100 cc an hour.   Patient initially wanted to get transferred to [facility] for further management but due to lack of bed availability he was transferred to [facility] for further evaluation. Home medications do include Lasix 40 mg p.o. daily, lisinopril 2.5 mg daily and also torsemide as mentioned in home meds 20 mg daily as well. Home medications do mention ibuprofen 600 mg every 6 hours as needed for pain as well. Nephrology is consulted due to acute kidney injury     8/22/22 [facility]):  Known history of multiple sclerosis with significant disability leading to motor weakness, coronary artery disease history of stent placement in the past, ischemic cardiomyopathy ejection fraction of 25 to 30%, chronic kidney disease stage III baseline creatinine 1.2-1.4 has not seen a nephrologist.  Most of his care is through the [place] system. He also has an AICD device placed in the past.  Patient tells me that over the last 3 to 4 weeks he has been extremely weak, having hard time ambulation was having a hard time keeping his head up as well. He also had intermittent diarrhea. Was initially admitted to [facility] early part of this month. Was hydrated and then sent to [facility]. He was then discharged home and stayed home for 2 days and then developed the same symptoms again prompting him to come back to [facility]. During his stay there he was found to have monomorphic ventricular tachycardia which was confirmed by [facility] after evaluating his AICD rhythm. He was then transferred to [facility] for cardiac cath. Echocardiogram done earlier showed an ejection fraction of 25%. He had a slight bump in his troponin to 108. His creatinine was found to be 1.3 which is likely his baseline. He is never seen a nephrologist before although he has been told by the [place] physicians that there is an element of kidney disease. He does have history of nocturia gets up to 3-4 times a night.   Also had history of retention during his previous admission to [address] [place] [address] required Rosas catheter at one time. Subsequently tells me that he was put on medications which helped improve his urination. Denies any history of using catheters to drain urine or retention on a regular basis. No history of bowel or bladder incontinence. No history of lower extremity edema. Does have intermittent shortness of breath. No cough phlegm or hemoptysis. No fever or chills. Labs 8/22/2022: Sodium 140 potassium 4.2 creatinine 1.3 glucose 100 calcium 8.2 albumin 3.4         Pt denies any hx of heavy or prolonged NSAID use and there is no history of OTC herbal medications . No history of dysuria or frequency. Pt denies any hx of recent UTI , incontinence or nocturia or recurrent nephrolithiasis. No unusual skin rashes . No tea coloured urine . No recent procedures involving IV contrast.            Patient Active Problem List     Diagnosis Date Noted    Chronic idiopathic constipation 09/22/2022       Priority: Medium    Acute kidney injury superimposed on CKD ([facility] 09/20/2022       Priority: Medium    SANNA (acute kidney injury) ([address] 09/20/2022       Priority: Medium    Multiple sclerosis ([address] 09/20/2022       Priority: Medium    AICD (automatic cardioverter/defibrillator) present         Priority: Medium    Stage 3b chronic kidney disease ([address] 08/24/2022       Priority: Medium    Essential hypertension 08/24/2022       Priority: Medium    Fatigue 08/22/2022       Priority: Medium    Monomorphic ventricular tachycardia ([address] 08/21/2022       Priority: Medium    NSVT (nonsustained ventricular tachycardia) ([address] 08/19/2022       Priority: Medium    Combined systolic and diastolic cardiac dysfunction 08/19/2022       Priority: Medium    Longstanding persistent atrial fibrillation ([address] 08/19/2022       Priority: Medium    Weakness 08/18/2022       Priority: Medium    NSTEMI (non-ST elevated myocardial infarction) ([address] 07/27/2022 Priority: Medium    Hypotension 07/21/2022       Priority: Medium    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 ([facility] 07/21/2022       Priority: Medium    Coronary artery disease involving native coronary artery of native heart 07/21/2022       Priority: Medium    Ischemic cardiomyopathy 07/21/2022       Priority: Medium    Stage 3a chronic kidney disease ([facility] 07/21/2022       Priority: Medium    Chronic atrial fibrillation ([facility] 07/21/2022       Priority: Medium            CURRENT MEDICATIONS       Current Facility-Administered Medications          Current Outpatient Medications   Medication Sig Dispense Refill    furosemide (LASIX) 20 MG tablet Take 1 tablet by mouth daily 60 tablet 3    ampicillin (PRINCIPEN) 500 MG capsule Take 1 capsule by mouth 4 times daily for 7 days 28 capsule 0    bisacodyl (DULCOLAX) 10 MG suppository Place 1 suppository rectally daily 30 suppository 0    senna (SENOKOT) 8.6 MG tablet Take 1 tablet by mouth nightly 30 tablet 0    apixaban (ELIQUIS) 5 MG TABS tablet Take 5 mg by mouth 2 times daily Indications: for atrial fib        metoprolol succinate (TOPROL XL) 50 MG extended release tablet Take 1 tablet by mouth daily 30 tablet 3    amiodarone (CORDARONE) 200 MG tablet Take 1 tablet by mouth daily 30 tablet 0    clopidogrel (PLAVIX) 75 MG tablet Take 1 tablet by mouth daily 30 tablet 3    tamsulosin (FLOMAX) 0.4 MG capsule Take 1 capsule by mouth nightly 30 capsule 3    Rosuvastatin Calcium 20 MG CPSP Take 20 mg by mouth Daily        nitroGLYCERIN (NITROSTAT) 0.4 MG SL tablet Place 0.4 mg under the tongue every 5 minutes as needed for Chest pain up to max of 3 total doses. If no relief after 1 dose, call 911.         omeprazole (PRILOSEC) 20 MG delayed release capsule Take 40 mg by mouth Daily        albuterol sulfate HFA (PROVENTIL;VENTOLIN;PROAIR) 108 (90 Base) MCG/ACT inhaler Inhale 2 puffs into the lungs every 6 hours as needed for Wheezing          No current 02:02 PM     K 3.6 09/27/2022 02:02 PM      09/27/2022 02:02 PM      09/27/2022 02:02 PM     CO2 25 09/27/2022 02:02 PM     CO2 25 09/27/2022 02:02 PM     BUN 17 09/27/2022 02:02 PM     BUN 17 09/27/2022 02:02 PM     CREATININE 1.84 09/27/2022 02:02 PM     CREATININE 1.84 09/27/2022 02:02 PM     GLUCOSE 118 09/27/2022 02:02 PM     GLUCOSE 118 09/27/2022 02:02 PM     CALCIUM 8.8 09/27/2022 02:02 PM     CALCIUM 8.8 09/27/2022 02:02 PM      PHOSPHORUS:          Lab Results   Component Value Date/Time     PHOS 3.7 09/20/2022 07:02 AM      MAGNESIUM:         Lab Results   Component Value Date/Time     MG 1.9 09/27/2022 02:02 PM      ALBUMIN:         Lab Results   Component Value Date/Time     LABALBU 2.7 09/22/2022 06:53 AM      PTH: No results found for: IPTH  VIT D3,25 HYDROXY: @BRIEF(VITD3)@                IRON:  No results found for: IRON  IRON SATURATION:  No results found for: LABIRON  TIBC:  No results found for: TIBC  FERRITIN:  No results found for: FERRITIN           JANAE:         Lab Results   Component Value Date     JANAE NEGATIVE 09/20/2022       SPEP:         Lab Results   Component Value Date/Time     PROT 5.0 09/22/2022 06:53 AM     ALBCAL 3.1 08/22/2022 02:27 PM     ALBPCT 54 08/22/2022 02:27 PM     LABALPH 0.2 08/22/2022 02:27 PM     LABALPH 0.9 08/22/2022 02:27 PM     A1PCT 4 08/22/2022 02:27 PM     A2PCT 16 08/22/2022 02:27 PM     LABBETA 0.6 08/22/2022 02:27 PM     BETAPCT 11 08/22/2022 02:27 PM     GAMGLOB 0.9 08/22/2022 02:27 PM     GGPCT 16 08/22/2022 02:27 PM     PATH ELECTRONICALLY SIGNED.  Pancho Fowler M.D. 09/20/2022 12:29 PM      UPEP:         Lab Results   Component Value Date/Time     TPU 34 09/20/2022 12:29 PM     TPU 34 09/20/2022 12:29 PM      HEPBSAG:        Lab Results   Component Value Date/Time     HEPBSAG NONREACTIVE 09/20/2022 11:51 AM      HEPCAB:        Lab Results   Component Value Date/Time     HEPCAB NONREACTIVE 09/20/2022 11:51 AM      C3:         Lab Results Component Value Date     C3 119 09/20/2022      C4:         Lab Results   Component Value Date     C4 27 09/20/2022      MPO ANCA: No results found for: MPO .   PR3 ANCA:  No results found for: PR3     Electrolytes Latest Ref Rng & Units 9/27/2022 9/27/2022 9/22/2022   Na 135 - 144 mmol/L 143 143 142   K 3.7 - 5.3 mmol/L 3.6 (L) 3.6 (L) 4.1   Cl 98 - 107 mmol/L 109 (H) 109 (H) 110 (H)   CO2 20 - 31 mmol/L 25 25 21   BUN 8 - 23 mg/dL 17 17 38 (H)   Cr 0.70 - 1.20 mg/dL 1.84 (H) 1.84 (H) 2.93 (H)   Glu 70 - 99 mg/dL 118 (H) 118 (H) 80   Ca 8.6 - 10.4 mg/dL 8.8 8.8 8.0 (L)   Phos 2.5 - 4.5 mg/dL         Mg 1.6 - 2.6 mg/dL   1.9        Electrolytes Latest Ref Rng & Units 9/21/2022 9/20/2022 9/20/2022   Na 135 - 144 mmol/L 143 139 140   K 3.7 - 5.3 mmol/L 3.6 (L) 3.7 3.2 (L)   Cl 98 - 107 mmol/L 110 (H) 104 104   CO2 20 - 31 mmol/L 21 25 24   BUN 8 - 23 mg/dL 48 (H) 55 (H) 60 (H)   Cr 0.70 - 1.20 mg/dL 3.65 (H) 4.40 (H) 4.79 (H)   Glu 70 - 99 mg/dL 89 106 (H) 94   Ca 8.6 - 10.4 mg/dL 8.1 (L) 8.6 8.3 (L)   Phos 2.5 - 4.5 mg/dL     3.7   Mg 1.6 - 2.6 mg/dL 2.0   1.9      Electrolytes Latest Ref Rng & Units 9/19/2022 9/19/2022 8/24/2022   Na 135 - 144 mmol/L 132 (L) 131 (L) 139   K 3.7 - 5.3 mmol/L 3.8 3.3 (L) 4.0   Cl 98 - 107 mmol/L 92 (L) 89 (L) 105   CO2 20 - 31 mmol/L 19 (L) 24 22   BUN 8 - 23 mg/dL 59 (H) 60 (H) 26 (H)   Cr 0.70 - 1.20 mg/dL 5.41 (HH) 5.16 (HH) 1.50 (H)   Glu 70 - 99 mg/dL 91 130 (H) 100 (H)   Ca 8.6 - 10.4 mg/dL 8.4 (L) 9.0 8.3 (L)   Phos 2.5 - 4.5 mg/dL         Mg 1.6 - 2.6 mg/dL   2.2                     URINALYSIS/URINE CHEMISTRIES       URINE CREATININE:          Lab Results   Component Value Date/Time     LABCREA 67.7 09/20/2022 12:29 PM     LABCREA 67.7 09/20/2022 12:29 PM     LABCREA 66.0 09/20/2022 12:29 PM      URINE EOSINOPHILS :         Lab Results   Component Value Date/Time     UREO NONE SEEN 09/20/2022 12:29 PM      URINE PROTEIN:          Lab Results   Component Value Date/Time TPU 34 09/20/2022 12:29 PM     TPU 34 09/20/2022 12:29 PM                       RADIOLOGY    Results for orders placed during the hospital encounter of 08/18/22     US RENAL COMPLETE     Narrative  EXAMINATION:  RETROPERITONEAL ULTRASOUND OF THE KIDNEYS AND URINARY BLADDER     8/22/2022     COMPARISON:  CT abdomen pelvis from 07/21/2022     HISTORY:  ORDERING SYSTEM PROVIDED HISTORY: increased creatinine  TECHNOLOGIST PROVIDED HISTORY:  increased creatinine     80-year-old male with increased creatinine     FINDINGS:     Kidneys:     Right kidney measures 10.5 x 4.6 x 4.9 cm. Right renal cortical thickness  measures 1.3 cm. Atrophic left kidney measuring 7.0 x 3.8 x 3.7 cm. Left renal cortical  thickness measures 6 mm. Increased echogenicity of the left renal cortex which can be seen with  chronic medical renal disease. No hydronephrosis or perinephric fluid. Bladder:     Patient had no urge to void during the exam.     Urinary bladder grossly unremarkable in appearance. Urinary bladder measures 8.5 x 5.1 x 5.3 cm for a bladder volume of 119.2 mL. Impression  1. Atrophic left kidney. 2. No hydronephrosis. 3. Increased echogenicity of the left renal cortex which can be seen with  chronic medical renal disease. 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 Resolved after Rosas was placed. Currently has an indwelling Rosas planning to follow-up with urology soon  1. Chronic Kidney Disease : Stage IIIb secondary to ischemic nephrosclerosis from cardiorenal syndrome and hyperfiltration from a solitary functioning right kidney baseline 1.4-1.6 has previously had care through the [place] system. Right kidney 10 cm left 7 cm  2.  Hypertension : Blood pressure running low      BP Readings from Last 3 Encounters:   09/28/22 107/62   09/22/22 (!) 100/50   09/20/22 (!) 91/52    :  3. Severe ischemic cardiomyopathy ejection fraction 25%  4. Coronary disease recent stent placement:  5. Multiple sclerosis  6. Lower extremity edema        PLAN      1. Based on available labs patients renal function is stable. patient's volume status is  acceptable. Importance of tight blood pressure, glycemic control, lipid control was outlined to patient . 2.  Continue Lasix 20 daily take additional Lasix if he has notices worsening edema or weight gain of more than 3 to 4 pounds, continue with Rosas at present, agree with decreasing metoprolol dose, follow blood pressures closely  3. Follow up labs ordered : bmp, cbc,phos, intact pth, vitaminD 25 OH, albumin. 4. Urine for random protein and creat to be done monthly for the next 3 months. 5. follow up in the office in 3 months     Patient was asked to avoid NSAIDS. Please do not hesitate to call with questions.      This note is created with the assistance of a speech-recognition program. While intending to generate a document that actually reflects the content of the visit, no guarantees can be provided that every mistake has been identified and corrected by editing     Electronically signed by Bob Sanches MD on 9/28/2022 at 2:16 PM  [facility]               Routing History

## 2022-10-12 NOTE — OP NOTE
+Operative Note      Patient: Vita Coates  YOB: 1945  MRN: [id number]    Date of Procedure: 10/12/2022    Pre-Op Diagnosis: Retention of urine [R33.9]                                 Neurogenic bladder    Post-Op Diagnosis: Same, with enlarged prostate, bladder trabeculations       Procedure(s):  CYSTOSCOPY DILATATION UROFLOW    Surgeon(s):  Britton Tian MD    Assistant:   * No surgical staff found *    Anesthesia: Local    Estimated Blood Loss (mL): Minimal    Complications: None    Specimens:   ID Type Source Tests Collected by Time Destination   1 : CYSTO URINE Urine Urine, Cystoscopic URINALYSIS WITH REFLEX  Arnaldo Disla MD 10/12/2022 1429        Implants:  * No implants in log *      Drains:   [REMOVED] Urinary Catheter 09/20/22 2 Way (Removed)   $ Urethral catheter insertion $ Not inserted for procedure 09/20/22 2116   Catheter Indications Urinary retention (acute or chronic), continuous bladder irrigation or bladder outlet obstruction; Need for fluid volume management of the critically ill patient in a critical care setting 09/22/22 1616   Site Assessment No urethral drainage 09/22/22 1616   Urine Color Yellow 09/22/22 1616   Urine Appearance Clear 09/22/22 1616   Collection Container Standard 09/22/22 1616   Securement Method Securing device (Describe) 09/22/22 1616   Catheter Best Practices  Drainage tube clipped to bed;Catheter secured to thigh; Tamper seal intact; Bag below bladder;Bag not on floor; Lack of dependent loop in tubing;Drainage bag less than half full 09/22/22 1616   Status Patent;Draining 09/22/22 1616   Output (mL) 125 mL 09/22/22 0548       Findings: Indications: 51-year-old male, presents for evaluation and management of increasing lower urinary tract symptoms, urine tract infections, hematuria, inability to empty the bladder requiring Rosas catheter, catheter removal and void trial were provided, cystoscopy and uroflow was scheduled    Detailed Description of Procedure:   Patient was brought to the operating room, positioned in supine, proper patient identification procedure identification prepping and draping. We entered the bladder with the flexible cystoscope, we identified lateral lobe hypertrophy the prostate with visual occlusion of the bladder outlet. The anterior the bladder was carefully examined, thickened bladder wall and catheter cystitis changes identified. Urine specimen was obtained for culture and sensitivity. We did not identify any tumors ulcers or stones, ureteral orifices identified and effluxing clear urine. At the completion of the cystoscopy the flexible cystoscope was removed. We dilated the urethra and the prostate fossa through size 22 [place] without complication or bleeding. Uroflow study was performed, bladder was filled up to capacity, the patient tolerated 250 mL in the bladder, he felt at that point significant urgency. At full capacity he was asked to void, the patient voided with a peak flow of 10 mL, his postvoid residual was 50 mL, he voided 200 mL he tolerated the procedure well. The patient was returned to recovery room in stable condition. Recommendations: At the present time I have recommended to the patient to make sure he takes his Flomax to alleviate any bladder outlet obstruction    We also recommended monitoring bowel movements and avoid constipation which would sometimes cause urinary retention by direct compression on the prostate and bladder neck.     A follow-up visit at the office will be scheduled    Electronically signed by Kameron Mitchell MD on 10/12/2022 at 2:54 PM

## 2022-11-14 ENCOUNTER — HOSPITAL ENCOUNTER (OUTPATIENT)
Age: 77
Discharge: HOME | End: 2022-11-14
Payer: MEDICARE

## 2022-11-14 DIAGNOSIS — N18.32 STAGE 3B CHRONIC KIDNEY DISEASE (HCC): ICD-10-CM

## 2022-11-14 DIAGNOSIS — N18.9 ACUTE KIDNEY INJURY SUPERIMPOSED ON CKD (HCC): ICD-10-CM

## 2022-11-14 DIAGNOSIS — N17.9 ACUTE KIDNEY INJURY SUPERIMPOSED ON CKD (HCC): ICD-10-CM

## 2022-11-14 LAB
ANION GAP SERPL CALCULATED.3IONS-SCNC: 15 MMOL/L (ref 9–17)
BUN BLDV-MCNC: 18 MG/DL (ref 8–23)
CALCIUM SERPL-MCNC: 8.8 MG/DL (ref 8.6–10.4)
CHLORIDE BLD-SCNC: 109 MMOL/L (ref 98–107)
CO2: 22 MMOL/L (ref 20–31)
CREAT SERPL-MCNC: 1.61 MG/DL (ref 0.7–1.2)
GFR SERPL CREATININE-BSD FRML MDRD: 44 ML/MIN/1.73M2
GLUCOSE BLD-MCNC: 92 MG/DL (ref 70–99)
POTASSIUM SERPL-SCNC: 4 MMOL/L (ref 3.7–5.3)
SODIUM BLD-SCNC: 146 MMOL/L (ref 135–144)

## 2022-11-14 PROCEDURE — 36415 COLL VENOUS BLD VENIPUNCTURE: CPT

## 2022-11-14 PROCEDURE — 80048 BASIC METABOLIC PNL TOTAL CA: CPT

## 2023-08-31 ENCOUNTER — APPOINTMENT (OUTPATIENT)
Dept: CT IMAGING | Age: 78
End: 2023-08-31
Payer: MEDICARE

## 2023-08-31 ENCOUNTER — HOSPITAL ENCOUNTER (EMERGENCY)
Age: 78
Discharge: HOME | End: 2023-08-31
Attending: EMERGENCY MEDICINE
Payer: MEDICARE

## 2023-08-31 VITALS
HEIGHT: 69 IN | BODY MASS INDEX: 22.22 KG/M2 | TEMPERATURE: 97.5 F | HEART RATE: 59 BPM | RESPIRATION RATE: 16 BRPM | OXYGEN SATURATION: 98 % | SYSTOLIC BLOOD PRESSURE: 123 MMHG | WEIGHT: 150 LBS | DIASTOLIC BLOOD PRESSURE: 55 MMHG

## 2023-08-31 DIAGNOSIS — K92.1 MELENA: Primary | ICD-10-CM

## 2023-08-31 LAB
ALBUMIN SERPL-MCNC: 4.1 G/DL (ref 3.5–5.2)
ALBUMIN/GLOB SERPL: 1.2 {RATIO} (ref 1–2.5)
ALP SERPL-CCNC: 87 U/L (ref 40–129)
ALT SERPL-CCNC: 7 U/L (ref 5–41)
ANION GAP SERPL CALCULATED.3IONS-SCNC: 11 MMOL/L (ref 9–17)
AST SERPL-CCNC: 14 U/L
BASOPHILS # BLD: 0 K/UL (ref 0–0.2)
BASOPHILS NFR BLD: 1 % (ref 0–2)
BILIRUB SERPL-MCNC: 0.6 MG/DL (ref 0.3–1.2)
BUN SERPL-MCNC: 31 MG/DL (ref 8–23)
CALCIUM SERPL-MCNC: 9.6 MG/DL (ref 8.6–10.4)
CHLORIDE SERPL-SCNC: 102 MMOL/L (ref 98–107)
CO2 SERPL-SCNC: 27 MMOL/L (ref 20–31)
CREAT SERPL-MCNC: 1.7 MG/DL (ref 0.7–1.2)
EOSINOPHIL # BLD: 0.1 K/UL (ref 0–0.4)
EOSINOPHILS RELATIVE PERCENT: 2 % (ref 1–4)
ERYTHROCYTE [DISTWIDTH] IN BLOOD BY AUTOMATED COUNT: 15.1 % (ref 12.5–15.4)
GFR SERPL CREATININE-BSD FRML MDRD: 41 ML/MIN/1.73M2
GLUCOSE SERPL-MCNC: 101 MG/DL (ref 70–99)
HCT VFR BLD AUTO: 45.7 % (ref 41–53)
HGB BLD-MCNC: 14.6 G/DL (ref 13.5–17.5)
INR PPP: 1.1
LIPASE SERPL-CCNC: 17 U/L (ref 13–60)
LYMPHOCYTES NFR BLD: 1.1 K/UL (ref 1–4.8)
LYMPHOCYTES RELATIVE PERCENT: 18 % (ref 24–44)
MCH RBC QN AUTO: 29.1 PG (ref 26–34)
MCHC RBC AUTO-ENTMCNC: 32 G/DL (ref 31–37)
MCV RBC AUTO: 91 FL (ref 80–100)
MONOCYTES NFR BLD: 0.5 K/UL (ref 0.1–1.2)
MONOCYTES NFR BLD: 8 % (ref 2–11)
NEUTROPHILS NFR BLD: 71 % (ref 36–66)
NEUTS SEG NFR BLD: 4.6 K/UL (ref 1.8–7.7)
PARTIAL THROMBOPLASTIN TIME: 26.3 SEC (ref 21.3–31.3)
PLATELET # BLD AUTO: 258 K/UL (ref 140–450)
PMV BLD AUTO: 7.4 FL (ref 6–12)
POTASSIUM SERPL-SCNC: 4.4 MMOL/L (ref 3.7–5.3)
PROT SERPL-MCNC: 7.4 G/DL (ref 6.4–8.3)
PROTHROMBIN TIME: 12.2 SEC (ref 9.4–12.6)
RBC # BLD AUTO: 5.02 M/UL (ref 4.5–5.9)
SODIUM SERPL-SCNC: 140 MMOL/L (ref 135–144)
WBC OTHER # BLD: 6.4 K/UL (ref 3.5–11)

## 2023-08-31 PROCEDURE — 99284 EMERGENCY DEPT VISIT MOD MDM: CPT | Performed by: EMERGENCY MEDICINE

## 2023-08-31 PROCEDURE — 36415 COLL VENOUS BLD VENIPUNCTURE: CPT

## 2023-08-31 PROCEDURE — 83690 ASSAY OF LIPASE: CPT

## 2023-08-31 PROCEDURE — 85025 COMPLETE CBC W/AUTO DIFF WBC: CPT

## 2023-08-31 PROCEDURE — 80053 COMPREHEN METABOLIC PANEL: CPT

## 2023-08-31 PROCEDURE — 74176 CT ABD & PELVIS W/O CONTRAST: CPT

## 2023-08-31 PROCEDURE — 85730 THROMBOPLASTIN TIME PARTIAL: CPT

## 2023-08-31 PROCEDURE — 85610 PROTHROMBIN TIME: CPT

## 2023-08-31 RX ORDER — SODIUM CHLORIDE 0.9 % (FLUSH) 0.9 %
3 SYRINGE (ML) INJECTION EVERY 8 HOURS
Status: DISCONTINUED | OUTPATIENT
Start: 2023-08-31 | End: 2023-08-31 | Stop reason: HOSPADM

## 2023-08-31 ASSESSMENT — ENCOUNTER SYMPTOMS
NAUSEA: 0
ABDOMINAL PAIN: 0
SHORTNESS OF BREATH: 0
ANAL BLEEDING: 0
BLOOD IN STOOL: 1
VOMITING: 0

## 2023-08-31 ASSESSMENT — PAIN - FUNCTIONAL ASSESSMENT: PAIN_FUNCTIONAL_ASSESSMENT: NONE - DENIES PAIN

## 2023-08-31 NOTE — DISCHARGE INSTRUCTIONS
Since of any abnormal blood work, anemia or abnormal CAT scan. You have a small aneurysm of the aorta. You are not aware of the small aorta aneurysm make sure you discuss with your doctor at the [facility]. Please follow-up with the [facility] for recheck. You probably need a scope of your bowels. You also call the GI doctor whose number I have included if you wish to see that person. Please return at any point if you feel any worsening bleeding is occurring abdominal pain new concerns or symptoms.

## 2024-03-22 NOTE — H&P
Patient notified about EKG result per Dr. Dugan EKG has signs of chronic infarct.Patient denies any chest pain or any other MI symptoms.Patient notified to let PCP know of ECG result.Patient stated understanding of such.   [facility]  Office: [address], DO, Corosacosmo Omid, DO, Shanna Riojas, DO, Jameel Langley Blood, DO, Artie Monroe MD, Olena Weeks MD, Rosenda Carter MD, Vinicius Boston MD,  Cassandra Gutierrez MD, Amanda Andrade MD, Aristeo Chino, DO, Lisa Kilpatrick MD,  Veronika Ziegler MD, Nayely Pfeiffer MD, Brian Ramos, DO, Mary Kate Vu MD, Elizabeth Larson MD, Joellen Appiah MD, Donya Meneses DO, Haritha North MD, Jennifer Gaines MD, Renetta Belcher, [facility],  Fortunato Whitney, [facility], Silvina Degroot, [facility], Shalini Toscano, CNP, Rowan Oppenheim, PATATYANA, Evgeny Gomez, [facility], Alexandra Gary, CNP, Teena Leo, CNP, Claudeen Quarry, CNP, Souleymane Tan, CNP, Lamonte Barone, CNP, Zak Arceo, CNS, [place], [facility], Johanny Pena, CNP, Solomon Guerrero, CNP, Marcus Latham, [facility]   [address] [facility]    HISTORY AND PHYSICAL EXAMINATION            Date:   7/28/2022  Patient name:  Blair Grullon  Date of admission:  7/27/2022 10:56 AM  MRN:   [id number]  Account:  [de-identified]  YOB: 1945  PCP:    Demetrius Moraes MD  Room:   [address] [place] [address]  Code Status:    Full Code    Chief Complaint:     Chief Complaint   Patient presents with    Fatigue     Discharged from here last week. Has been weak since. Shortness of Breath     History Obtained From:     patient    History of Present Illness:     Blair Grullon is a 68 y.o. male with a past medical history of HFrEF (last EF 25%), atrial fibrillation, CKD Stage 3a and hyperlipidemia who presented to the emergency department on 7/28/2022 complaining of shortness of breath, fatigue and leg swelling. The patient states that his symptoms began several days ago and have progressively worsened. He reports that he was feeling so fatigued yesterday that he was unable to get out of bed.  The patient was recently discharged from this facility on 7/21 following admission for chest pain and dehydration. In the ED, the patient was afebrile and nontoxic appearing. Troponin was noted to be elevated at 127 (baseline in the 30's). Pro-BNP was also elevated at 5024 (baseline around 1000). The patient clinically appeared to be volume overloaded. The patient is admitted to internal medicine for further management of NSTEMI as well as an acute HFrEF exacerbation. Past Medical History:     Past Medical History:   Diagnosis Date    Atrial flutter ([facility]     Hyperlipidemia     Hypertension     MI [facility] infarction[facility] ([facility]     MS (multiple sclerosis) ([facility]     Pacemaker, artificial         Past Surgical History:     Past Surgical History:   Procedure Laterality Date    CORONARY ANGIOPLASTY WITH STENT PLACEMENT      PACEMAKER PLACEMENT          Medications Prior to Admission:     Prior to Admission medications    Medication Sig Start Date End Date Taking? Authorizing Provider   furosemide (LASIX) 40 MG tablet Take 1 tablet by mouth in the morning. 7/23/22 8/22/22  Deanne Fabry,    lisinopril (PRINIVIL;ZESTRIL) 2.5 MG tablet Take 1 tablet by mouth in the morning. 7/23/22   Deanne Fabry, DO   metoprolol succinate (TOPROL XL) 25 MG extended release tablet Take 0.5 tablets by mouth in the morning. 7/23/22   Deanne Fabry, DO   apixaban (ELIQUIS) 5 MG TABS tablet Take by mouth 2 times daily    Historical Provider, MD   albuterol sulfate HFA (PROVENTIL;VENTOLIN;PROAIR) 108 (90 Base) MCG/ACT inhaler Inhale 2 puffs into the lungs every 6 hours as needed for Wheezing    Historical Provider, MD   Rosuvastatin Calcium 20 MG CPSP Take by mouth Daily    Historical Provider, MD   nitroGLYCERIN (NITROSTAT) 0.4 MG SL tablet Place 0.4 mg under the tongue every 5 minutes as needed for Chest pain up to max of 3 total doses. If no relief after 1 dose, call 911. Historical Provider, MD   chlorhexidine (HIBICLENS) 4 % external liquid Apply topically daily as needed Apply topically daily as needed. Historical Provider, MD   acetaminophen (TYLENOL) 325 MG tablet Take 650 mg by mouth every 6 hours as needed for Pain    Historical Provider, MD   omeprazole (PRILOSEC) 20 MG delayed release capsule Take 20 mg by mouth Daily    Historical Provider, MD   ibuprofen (IBU) 600 MG tablet Take 1 tablet by mouth every 6 hours as needed for Pain 6/25/22 7/2/22  Ruiz Salmeron MD        Allergies:     Codeine, Doxycycline, Erythromycin, Gammagard [immune globulin], and Sulfa antibiotics    Social History:     Tobacco:    reports that he has quit smoking. He has never used smokeless tobacco.  Alcohol:      reports no history of alcohol use. Drug Use:  reports no history of drug use. Family History:     History reviewed. No pertinent family history. Review of Systems:     Positive and Negative as described in HPI. CONSTITUTIONAL: Positive for fatigue and weakness. HEENT:  negative for vision, hearing changes, runny nose, throat pain  RESPIRATORY:  Positive for shortness of breath.    CARDIOVASCULAR:  negative for chest pain, palpitations  GASTROINTESTINAL:  negative for nausea, vomiting, diarrhea, constipation, change in bowel habits, abdominal pain   GENITOURINARY:  negative for difficulty of urination, burning with urination, frequency   INTEGUMENT:  negative for rash, skin lesions, easy bruising   HEMATOLOGIC/LYMPHATIC:  negative for swelling/edema   ALLERGIC/IMMUNOLOGIC:  negative for urticaria , itching  ENDOCRINE:  negative increase in drinking, increase in urination, hot or cold intolerance  MUSCULOSKELETAL:  negative joint pains, muscle aches, swelling of joints  NEUROLOGICAL:  negative for headaches, dizziness, lightheadedness, numbness, pain, tingling extremities  BEHAVIOR/PSYCH:  negative for depression, anxiety    Physical Exam:   /68   Pulse 70   Temp 98.2 °F (36.8 °C) (Temporal)   Resp 16   Ht 5' 9\" (1.753 m)   Wt 171 lb 15.3 oz (78 kg)   SpO2 95%   BMI 25.39 kg/m²   Temp (24hrs), 32.8 31 - 37 g/dL    RDW 14.6 12.5 - 15.4 %    Platelets 947 580 - 088 k/uL    MPV 8.0 6.0 - 12.0 fL    Seg Neutrophils 70 (H) 36 - 66 %    Lymphocytes 12 (L) 24 - 44 %    Monocytes 16 (H) 2 - 11 %    Eosinophils % 1 1 - 4 %    Basophils 1 0 - 2 %    Segs Absolute 5.70 1.8 - 7.7 k/uL    Absolute Lymph # 1.00 1.0 - 4.8 k/uL    Absolute Mono # 1.30 (H) 0.1 - 1.2 k/uL    Absolute Eos # 0.10 0.0 - 0.4 k/uL    Basophils Absolute 0.10 0.0 - 0.2 k/uL   Basic Metabolic Panel    Collection Time: 07/27/22 11:16 AM   Result Value Ref Range    Glucose 117 (H) 70 - 99 mg/dL    BUN 22 8 - 23 mg/dL    Creatinine 1.40 (H) 0.70 - 1.20 mg/dL    Calcium 9.1 8.6 - 10.4 mg/dL    Sodium 136 135 - 144 mmol/L    Potassium 3.4 (L) 3.7 - 5.3 mmol/L    Chloride 99 98 - 107 mmol/L    CO2 25 20 - 31 mmol/L    Anion Gap 12 9 - 17 mmol/L    GFR Non-African American 49 (L) >60 mL/min    GFR African American 60 (L) >60 mL/min    GFR Comment         Troponin    Collection Time: 07/27/22 11:16 AM   Result Value Ref Range    Troponin, High Sensitivity 127 (HH) 0 - 22 ng/L   Brain Natriuretic Peptide    Collection Time: 07/27/22 11:16 AM   Result Value Ref Range    Pro-BNP 5,024 (H) <300 pg/mL   Lactate, Sepsis    Collection Time: 07/27/22 11:16 AM   Result Value Ref Range    Lactic Acid, Sepsis 1.4 0.5 - 1.9 mmol/L   EKG 12 Lead    Collection Time: 07/27/22 12:59 PM   Result Value Ref Range    Ventricular Rate 78 BPM    Atrial Rate 312 BPM    QRS Duration 180 ms    Q-T Interval 486 ms    QTc Calculation (Bazett) 554 ms    R Axis -99 degrees    T Axis 14 degrees   Troponin    Collection Time: 07/27/22  1:02 PM   Result Value Ref Range    Troponin, High Sensitivity 108 (HH) 0 - 22 ng/L   COVID-19, Rapid    Collection Time: 07/27/22  1:28 PM    Specimen: Nasopharyngeal Swab   Result Value Ref Range    Specimen Description . NASOPHARYNGEAL SWAB     SARS-CoV-2, Rapid Not Detected Not Detected   Urinalysis with Reflex to Culture    Collection Time: 07/27/22 5:00 PM    Specimen: Urine, clean catch   Result Value Ref Range    Color, UA Yellow Yellow    Turbidity UA Clear Clear    Glucose, Ur NEGATIVE NEGATIVE    Bilirubin Urine NEGATIVE NEGATIVE    Ketones, Urine NEGATIVE NEGATIVE    Specific Gravity, UA 1.025 1.005 - 1.030    Urine Hgb TRACE (A) NEGATIVE    pH, UA 5.5 5.0 - 8.0    Protein, UA 1+ (A) NEGATIVE    Urobilinogen, Urine Normal Normal    Nitrite, Urine NEGATIVE NEGATIVE    Leukocyte Esterase, Urine NEGATIVE NEGATIVE   Microscopic Urinalysis    Collection Time: 07/27/22  5:00 PM   Result Value Ref Range    -          WBC, UA 2 TO 5 0 - 5 /HPF    RBC, UA 2 TO 5 0 - 2 /HPF    Casts UA 10 TO 20 /LPF    Casts UA FINE GRANULAR /LPF    Casts UA 2 TO 5 /LPF    Casts UA HYALINE /LPF    Epithelial Cells UA 2 TO 5 0 - 5 /HPF    Bacteria, UA FEW (A) None    Mucus, UA 1+ (A) None    Amorphous, UA 1+ (A) None    Other Observations UA (A) NOT REQ. Utilizing a urinalysis as the only screening method to exclude a potential uropathogen can be unreliable in many patient populations. Rapid screening tests are less sensitive than culture and if UTI is a clinical possibility, culture should be considered despite a negative urinalysis.      APTT    Collection Time: 07/27/22  8:25 PM   Result Value Ref Range    PTT 26.8 21.3 - 31.3 sec   Troponin    Collection Time: 07/27/22  8:25 PM   Result Value Ref Range    Troponin, High Sensitivity 86 (HH) 0 - 22 ng/L   CBC    Collection Time: 07/27/22  8:25 PM   Result Value Ref Range    WBC 7.8 3.5 - 11.0 k/uL    RBC 3.99 (L) 4.5 - 5.9 m/uL    Hemoglobin 11.5 (L) 13.5 - 17.5 g/dL    Hematocrit 34.6 (L) 41 - 53 %    MCV 86.7 80 - 100 fL    MCH 28.7 26 - 34 pg    MCHC 33.2 31 - 37 g/dL    RDW 14.4 12.5 - 15.4 %    Platelets 706 584 - 799 k/uL    MPV 8.2 6.0 - 12.0 fL   APTT    Collection Time: 07/28/22  1:28 AM   Result Value Ref Range    PTT 55.5 (H) 21.3 - 31.3 sec   Comprehensive Metabolic Panel w/ Reflex to MG    Collection Time: 07/28/22  1:28 AM   Result Value Ref Range    Glucose 117 (H) 70 - 99 mg/dL    BUN 20 8 - 23 mg/dL    Creatinine 1.28 (H) 0.70 - 1.20 mg/dL    Calcium 8.9 8.6 - 10.4 mg/dL    Sodium 133 (L) 135 - 144 mmol/L    Potassium 3.7 3.7 - 5.3 mmol/L    Chloride 99 98 - 107 mmol/L    CO2 22 20 - 31 mmol/L    Anion Gap 12 9 - 17 mmol/L    Alkaline Phosphatase 76 40 - 129 U/L    ALT 19 5 - 41 U/L    AST 15 <40 U/L    Total Bilirubin 0.50 0.3 - 1.2 mg/dL    Total Protein 6.4 6.4 - 8.3 g/dL    Albumin 3.2 (L) 3.5 - 5.2 g/dL    Albumin/Globulin Ratio 1.0 1.0 - 2.5    GFR Non-African American 55 (L) >60 mL/min    GFR African American >60 >60 mL/min    GFR Comment         Magnesium    Collection Time: 07/28/22  1:28 AM   Result Value Ref Range    Magnesium 1.9 1.6 - 2.6 mg/dL   Brain natriuretic peptide    Collection Time: 07/28/22  1:28 AM   Result Value Ref Range    Pro-BNP 4,626 (H) <300 pg/mL   CBC    Collection Time: 07/28/22  1:28 AM   Result Value Ref Range    WBC 8.6 3.5 - 11.0 k/uL    RBC 4.14 (L) 4.5 - 5.9 m/uL    Hemoglobin 11.6 (L) 13.5 - 17.5 g/dL    Hematocrit 36.7 (L) 41 - 53 %    MCV 88.6 80 - 100 fL    MCH 28.0 26 - 34 pg    MCHC 31.6 31 - 37 g/dL    RDW 14.2 12.5 - 15.4 %    Platelets 797 818 - 799 k/uL    MPV 10.0 8.0 - 14.0 fL   Troponin    Collection Time: 07/28/22  1:28 AM   Result Value Ref Range    Troponin, High Sensitivity 87 (HH) 0 - 22 ng/L   EKG 12 lead    Collection Time: 07/28/22  4:07 AM   Result Value Ref Range    Ventricular Rate 73 BPM    Atrial Rate 300 BPM    QRS Duration 168 ms    Q-T Interval 478 ms    QTc Calculation (Bazett) 526 ms    R Axis -111 degrees    T Axis 10 degrees   APTT    Collection Time: 07/28/22  7:47 AM   Result Value Ref Range    PTT 49.0 (H) 21.3 - 31.3 sec       Imaging/Diagnostics:  XR CHEST PORTABLE    Result Date: 7/27/2022  No acute process. Stable cardiomegaly     XR CHEST PORTABLE    Result Date: 7/21/2022  1. Patchy left lower lobe airspace opacities.   This could represent pneumonia in the appropriate clinical setting. 2.  Mild cardiomegaly with left-sided ICD     CTA CHEST ABDOMEN PELVIS W CONTRAST    Result Date: 7/24/2022  No evidence of aortic dissection or thoracic aortic aneurysm. Infrarenal abdominal aortic aneurysm, measuring 3-cm. No acute aortic abnormality. No central pulmonary embolism. Chronically occluded right external iliac artery. The visualized bilateral superficial arteries are occluded as well, likely on a chronic basis. Minimal bibasilar atelectasis. Otherwise, clear lungs. Cardiomegaly. 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 The upper left renal pole is viable, perfused separately VA smaller accessory branch. Sigmoid diverticulosis. Enlarged prostate gland. No acute findings within the abdomen or pelvis. RECOMMENDATIONS: 3 cm infrarenal abdominal aortic aneurysm. Recommend follow-up every 3 years. Reference: J Am John Radiol 3508;74:806-273. CTA CHEST ABDOMEN W CONTRAST    Result Date: 7/21/2022  No evidence of aortic dissection or thoracic aortic aneurysm. Infrarenal abdominal aortic aneurysm, measuring 3-cm. No acute aortic abnormality. No central pulmonary embolism. Chronically occluded right external iliac artery. The visualized bilateral superficial arteries are occluded as well, likely on a chronic basis. Minimal bibasilar atelectasis. Otherwise, clear lungs. Cardiomegaly. 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 The upper left renal pole is viable, perfused separately VA smaller accessory branch. Sigmoid diverticulosis. Enlarged prostate gland. No acute findings within the abdomen or pelvis. RECOMMENDATIONS: 3 cm infrarenal abdominal aortic aneurysm. Recommend follow-up every 3 years. Reference: J Am John Radiol 2720;[id number]:[id number].        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 ([facility] 7/28/2022 Yes    Coronary artery disease involving native coronary artery of native heart 7/28/2022 Yes    Stage 3a chronic kidney disease ([facility] 7/28/2022 Yes    Chronic atrial fibrillation ([facility] 7/28/2022 Yes       Plan:     Patient status inpatient in the Progressive Unit/Step down    NSTEMI   -Continue heparin infusion   -Troponin is trending down   -Most recent echocardiogram (7/21/2022) showing severely reduced EF of 25%  -Consult cardiology; appreciate recommendations   -Aspirin 81 mg daily   -Atorvastatin 80 mg nightly     HFrEF   -Currently in acute exacerbation   -Start IV furosemide 20 mg bid   -Strict I&O's   -Daily weights   -Diet low sodium   -Continue home lisinopril 5 mg daily   -Most recent EF 25% as above (AICD in place)   -Consult cardiology as above     CKD Stage 3a   -Creatinine is at baseline   -Daily BMP     Atrial fibrillation   -Continue heparin infusion as above   -Continue home metoprolol 25 mg daily     Hyperlipidemia   -Atorvastatin 80 mg nightly as above     Consultations:   IP CONSULT TO CARDIOLOGY  IP CONSULT TO UROLOGY    Patient is admitted as inpatient status because of co-morbidities listed above, severity of signs and symptoms as outlined, requirement for current medical therapies and most importantly because of direct risk to patient if care not provided in a hospital setting. Expected length of stay > 48 hours.     Bhavin Rogers MD  7/28/2022  8:48 AM    Copy sent to Dr. Deirdre Somers MD

## 2024-06-03 NOTE — PLAN OF CARE
Problem: Discharge Planning  Goal: Discharge to home or other facility with appropriate resources  Outcome: Progressing  Flowsheets (Taken 7/27/2022 2237)  Discharge to home or other facility with appropriate resources:   Identify barriers to discharge with patient and caregiver   Identify discharge learning needs (meds, wound care, etc)   Refer to discharge planning if patient needs post-hospital services based on physician order or complex needs related to functional status, cognitive ability or social support system   Arrange for needed discharge resources and transportation as appropriate   Arrange for interpreters to assist at discharge as needed     Problem: Skin/Tissue Integrity  Goal: Absence of new skin breakdown  Description: 1. Monitor for areas of redness and/or skin breakdown  2. Assess vascular access sites hourly  3. Every 4-6 hours minimum:  Change oxygen saturation probe site  4. Every 4-6 hours:  If on nasal continuous positive airway pressure, respiratory therapy assess nares and determine need for appliance change or resting period.   Outcome: Progressing     Problem: ABCDS Injury Assessment  Goal: Absence of physical injury  Outcome: Progressing     Problem: Safety - Adult  Goal: Free from fall injury  Outcome: Progressing     Problem: Pain  Goal: Verbalizes/displays adequate comfort level or baseline comfort level  Outcome: Progressing This was straightened out, patient does not need a prior auth for generic

## 2024-09-29 NOTE — FLOWSHEET NOTE
[address] - [address] [facility]  PROGRESS NOTE    Shift date: 22  Shift day: Friday   Shift # 1    Room # 331/331-01   Name: Andrae Prado                  Referral: Routine Visit    Admit Date & Time: 2022 11:58 AM    Assessment:  Andrae Prado is a 68 y.o. male in the hospital. Upon entering the room writer observes Patient reclined in bed. Patient spoke softly. He shared how he was doing. He noted that he has been in and out of facilities for a while. When asked if he had any spiritual or Anabaptism needs, Pt shared that he no longer goes to Hinduism since his spouse . He told writer he did not have any needs at this time. Intervention:  Writer introduced self and title as . Writer inquired about Pt's coping and needs. Writer offered words of support and encouragement. Writer wished Pt well. Outcome:  Patient thanked Angela Cain. Plan:  There is no plan for a follow up visit.        Electronically signed by Dilip Espinal on 2022 at 4:48 PM.  [address] [facility]  [phone] Fall with Harm Risk

## 2025-04-03 ENCOUNTER — HOSPITAL ENCOUNTER (EMERGENCY)
Age: 80
Discharge: HOME | End: 2025-04-03
Attending: EMERGENCY MEDICINE
Payer: OTHER GOVERNMENT

## 2025-04-03 VITALS
DIASTOLIC BLOOD PRESSURE: 62 MMHG | OXYGEN SATURATION: 98 % | HEART RATE: 57 BPM | BODY MASS INDEX: 23.64 KG/M2 | RESPIRATION RATE: 18 BRPM | SYSTOLIC BLOOD PRESSURE: 128 MMHG | HEIGHT: 68 IN | WEIGHT: 156 LBS | TEMPERATURE: 97.9 F

## 2025-04-03 DIAGNOSIS — R31.9 HEMATURIA, UNSPECIFIED TYPE: ICD-10-CM

## 2025-04-03 DIAGNOSIS — R33.9 URINARY RETENTION: Primary | ICD-10-CM

## 2025-04-03 LAB
ANION GAP SERPL CALCULATED.3IONS-SCNC: 14 MMOL/L (ref 9–17)
BASOPHILS # BLD: 0 K/UL (ref 0–0.2)
BASOPHILS NFR BLD: 0 % (ref 0–2)
BILIRUB UR QL STRIP: NEGATIVE
BUN SERPL-MCNC: 37 MG/DL (ref 8–23)
CALCIUM SERPL-MCNC: 9 MG/DL (ref 8.6–10.4)
CHARACTER UR: ABNORMAL
CHLORIDE SERPL-SCNC: 100 MMOL/L (ref 98–107)
CLARITY UR: CLEAR
CO2 SERPL-SCNC: 23 MMOL/L (ref 20–31)
COLOR UR: YELLOW
CREAT SERPL-MCNC: 1.7 MG/DL (ref 0.7–1.2)
EOSINOPHIL # BLD: 0 K/UL (ref 0–0.4)
EOSINOPHILS RELATIVE PERCENT: 0 % (ref 1–4)
EPI CELLS #/AREA URNS HPF: ABNORMAL /HPF (ref 0–5)
ERYTHROCYTE [DISTWIDTH] IN BLOOD BY AUTOMATED COUNT: 15.5 % (ref 12.5–15.4)
GFR, ESTIMATED: 40 ML/MIN/1.73M2
GLUCOSE SERPL-MCNC: 168 MG/DL (ref 70–99)
GLUCOSE UR STRIP-MCNC: ABNORMAL MG/DL
HCT VFR BLD AUTO: 41 % (ref 41–53)
HGB BLD-MCNC: 13 G/DL (ref 13.5–17.5)
HGB UR QL STRIP.AUTO: ABNORMAL
KETONES UR STRIP-MCNC: NEGATIVE MG/DL
LEUKOCYTE ESTERASE UR QL STRIP: ABNORMAL
LYMPHOCYTES NFR BLD: 0.6 K/UL (ref 1–4.8)
LYMPHOCYTES RELATIVE PERCENT: 7 % (ref 24–44)
MCH RBC QN AUTO: 28.1 PG (ref 26–34)
MCHC RBC AUTO-ENTMCNC: 31.7 G/DL (ref 31–37)
MCV RBC AUTO: 88.5 FL (ref 80–100)
MONOCYTES NFR BLD: 0.4 K/UL (ref 0.1–1.2)
MONOCYTES NFR BLD: 5 % (ref 2–11)
NEUTROPHILS NFR BLD: 88 % (ref 36–66)
NEUTS SEG NFR BLD: 7.4 K/UL (ref 1.8–7.7)
NITRITE UR QL STRIP: NEGATIVE
PH UR STRIP: 6 [PH] (ref 5–8)
PLATELET # BLD AUTO: 255 K/UL (ref 140–450)
PMV BLD AUTO: 7.3 FL (ref 6–12)
POTASSIUM SERPL-SCNC: 4.5 MMOL/L (ref 3.7–5.3)
PROT UR STRIP-MCNC: ABNORMAL MG/DL
RBC # BLD AUTO: 4.63 M/UL (ref 4.5–5.9)
RBC #/AREA URNS HPF: ABNORMAL /HPF (ref 0–2)
SODIUM SERPL-SCNC: 137 MMOL/L (ref 135–144)
SP GR UR STRIP: 1.02 (ref 1–1.03)
UROBILINOGEN UR STRIP-ACNC: NORMAL EU/DL (ref 0–1)
WBC #/AREA URNS HPF: ABNORMAL /HPF (ref 0–5)
WBC OTHER # BLD: 8.4 K/UL (ref 3.5–11)

## 2025-04-03 PROCEDURE — 80048 BASIC METABOLIC PNL TOTAL CA: CPT

## 2025-04-03 PROCEDURE — 81001 URINALYSIS AUTO W/SCOPE: CPT

## 2025-04-03 PROCEDURE — 36415 COLL VENOUS BLD VENIPUNCTURE: CPT

## 2025-04-03 PROCEDURE — 85025 COMPLETE CBC W/AUTO DIFF WBC: CPT

## 2025-04-03 PROCEDURE — 99283 EMERGENCY DEPT VISIT LOW MDM: CPT

## 2025-04-03 ASSESSMENT — PAIN - FUNCTIONAL ASSESSMENT: PAIN_FUNCTIONAL_ASSESSMENT: NONE - DENIES PAIN

## 2025-04-03 NOTE — ED PROVIDER NOTES
[facility] EMERGENCY DEPARTMENT  EMERGENCY DEPARTMENT ENCOUNTER      Pt Name: Christian Quinn  MRN: [id number]  Birthdate 1945  Date of evaluation: 4/3/2025  Provider: SAVANNA West CNP  3:29 PM    CHIEF COMPLAINT       Chief Complaint   Patient presents with    Hematuria     No pain, blood in urine started this morning.          HISTORY OF PRESENT ILLNESS    Christian Quinn is a 80 y.o. male who presents to the emergency department for evaluation of intermittent blood in the urine.  Patient states that it started this morning and tells me that she history of this on and off but it typically goes away on its own he noticed that this morning it really was not away.  He then tells me that he is actually been having this for several days.  [facility] and really did not tell them about it because he did not want to have the surgery canceled.  There is no pain he has no back pain no abdominal pain no fevers no chills.  Does have a little bit of burning at the end of his stream.  Reports that he has seen a urologist in the past and is supposed to take Lasix but only occasionally does not.  He is also supposed to take Flomax but again only occasionally takes this.    He has a history of MS and typically gets around in a scooter but can walk and stand with difficulty    HPI    Nursing Notes were reviewed.    REVIEW OF SYSTEMS       Review of Systems   All other systems reviewed and are negative.      Except as noted above the remainder of the review of systems was reviewed and negative.       PAST MEDICAL HISTORY     Past Medical History:   Diagnosis Date    Abdominal aortic aneurysm without rupture     Atrial flutter (HCC)     Chronic atrial fibrillation, unspecified (HCC)     Chronic kidney disease     Combined systolic and diastolic heart failure (HCC)     Diverticulosis large intestine w/o perforation or abscess w/bleeding     Hyperlipidemia     Hypotension

## 2025-04-03 NOTE — DISCHARGE INSTRUCTIONS
We evaluated you today for some blood in the urine.  You do not have infection but it does seem that you are retaining urine, and this can cause trouble.  We recommended a Rosas catheter today but you declined.    Urinary retention can cause difficulty such as bladder rupture and kidney damage.     Continue taking your Flomax, [facility] for an appointment with [facility] team and return immediately for any worsening symptoms or concerns

## 2025-04-08 NOTE — ED PROVIDER NOTES
Emergency Department     Faculty Attestation    I performed a history and physical examination of the patient and discussed management with the mid level provider. I reviewed the mid level provider's note and agree with the documented findings and plan of care. Any areas of disagreement are noted on the chart. I was personally present for the key portions of any procedures. I have documented in the chart those procedures where I was not present during the key portions. I have reviewed the emergency nurses triage note. I agree with the chief complaint, past medical history, past surgical history, allergies, medications, social and family history as documented unless otherwise noted below. Documentation of the HPI, Physical Exam and Medical Decision Making performed by medical students or scribes is based on my personal performance of the HPI, PE and MDM. For Physician Assistant/ Nurse Practitioner cases/documentation I have personally evaluated this patient and have completed at least one if not all key elements of the E/M (history, physical exam, and MDM). Additional findings are as noted.      Primary Care Physician:  No primary care provider on file.    CHIEF COMPLAINT       Chief Complaint   Patient presents with    Hematuria     No pain, blood in urine started this morning.        RECENT VITALS:   Temp: 97.9 °F (36.6 °C),  Pulse: 57, Respirations: 18, BP: 128/62    LABS:  Labs Reviewed   CBC WITH AUTO DIFFERENTIAL - Abnormal; Notable for the following components:       Result Value    Hemoglobin 13.0 (*)     RDW 15.5 (*)     Neutrophils % 88 (*)     Lymphocytes % 7 (*)     Eosinophils % 0 (*)     Lymphocytes Absolute 0.60 (*)     All other components within normal limits   BASIC METABOLIC PANEL - Abnormal; Notable for the following components:    Glucose 168 (*)     BUN 37 (*)     Creatinine 1.7 (*)     Est, Glom Filt Rate 40 (*)     All other components within normal limits   URINALYSIS WITH REFLEX TO

## 2025-08-09 ENCOUNTER — APPOINTMENT (OUTPATIENT)
Dept: GENERAL RADIOLOGY | Age: 80
DRG: 280 | End: 2025-08-09
Payer: OTHER GOVERNMENT

## 2025-08-09 ENCOUNTER — HOSPITAL ENCOUNTER (INPATIENT)
Age: 80
LOS: 2 days | Discharge: ANOTHER HOSPITAL | DRG: 280 | End: 2025-08-11
Attending: EMERGENCY MEDICINE | Admitting: STUDENT IN AN ORGANIZED HEALTH CARE EDUCATION/TRAINING PROGRAM
Payer: OTHER GOVERNMENT

## 2025-08-09 ENCOUNTER — APPOINTMENT (OUTPATIENT)
Dept: CT IMAGING | Age: 80
DRG: 280 | End: 2025-08-09
Payer: OTHER GOVERNMENT

## 2025-08-09 DIAGNOSIS — R79.89 ELEVATED TROPONIN: ICD-10-CM

## 2025-08-09 DIAGNOSIS — R55 SYNCOPE, UNSPECIFIED SYNCOPE TYPE: ICD-10-CM

## 2025-08-09 DIAGNOSIS — R07.9 CHEST PAIN, UNSPECIFIED TYPE: Primary | ICD-10-CM

## 2025-08-09 DIAGNOSIS — R55 SYNCOPE AND COLLAPSE: ICD-10-CM

## 2025-08-09 DIAGNOSIS — I63.9 CEREBROVASCULAR ACCIDENT (CVA), UNSPECIFIED MECHANISM (HCC): ICD-10-CM

## 2025-08-09 PROBLEM — I77.9 PERIPHERAL ARTERIAL OCCLUSIVE DISEASE: Status: ACTIVE | Noted: 2025-08-09

## 2025-08-09 PROBLEM — G47.30 SLEEP APNEA: Status: ACTIVE | Noted: 2025-08-09

## 2025-08-09 PROBLEM — D47.2 MONOCLONAL GAMMOPATHY: Status: ACTIVE | Noted: 2025-08-09

## 2025-08-09 LAB
ALBUMIN SERPL-MCNC: 3.8 G/DL (ref 3.5–5.2)
ALBUMIN/GLOB SERPL: 1.3 {RATIO} (ref 1–2.5)
ALP SERPL-CCNC: 61 U/L (ref 40–129)
ALT SERPL-CCNC: 9 U/L (ref 10–50)
ANION GAP SERPL CALCULATED.3IONS-SCNC: 13 MMOL/L (ref 9–16)
AST SERPL-CCNC: 15 U/L (ref 10–50)
BASOPHILS # BLD: 0.1 K/UL (ref 0–0.2)
BASOPHILS NFR BLD: 1 % (ref 0–2)
BILIRUB SERPL-MCNC: 0.4 MG/DL (ref 0–1.2)
BNP SERPL-MCNC: 1917 PG/ML (ref 0–450)
BUN SERPL-MCNC: 33 MG/DL (ref 8–23)
CALCIUM SERPL-MCNC: 9.2 MG/DL (ref 8.6–10.4)
CHLORIDE SERPL-SCNC: 103 MMOL/L (ref 98–107)
CO2 SERPL-SCNC: 25 MMOL/L (ref 20–31)
CREAT SERPL-MCNC: 1.9 MG/DL (ref 0.7–1.2)
EOSINOPHIL # BLD: 0.1 K/UL (ref 0–0.4)
EOSINOPHILS RELATIVE PERCENT: 1 % (ref 1–4)
ERYTHROCYTE [DISTWIDTH] IN BLOOD BY AUTOMATED COUNT: 14.7 % (ref 12.5–15.4)
GFR, ESTIMATED: 35 ML/MIN/1.73M2
GLUCOSE SERPL-MCNC: 90 MG/DL (ref 74–99)
HCT VFR BLD AUTO: 44.1 % (ref 41–53)
HGB BLD-MCNC: 14 G/DL (ref 13.5–17.5)
INR PPP: 1.4 (ref 0.9–1.2)
LACTATE BLDV-SCNC: 1.1 MMOL/L (ref 0.5–2.2)
LYMPHOCYTES NFR BLD: 0.7 K/UL (ref 1–4.8)
LYMPHOCYTES RELATIVE PERCENT: 8 % (ref 24–44)
MCH RBC QN AUTO: 29.1 PG (ref 26–34)
MCHC RBC AUTO-ENTMCNC: 31.8 G/DL (ref 31–37)
MCV RBC AUTO: 91.5 FL (ref 80–100)
MONOCYTES NFR BLD: 0.6 K/UL (ref 0.1–1.2)
MONOCYTES NFR BLD: 7 % (ref 2–11)
NEUTROPHILS NFR BLD: 83 % (ref 36–66)
NEUTS SEG NFR BLD: 7.9 K/UL (ref 1.8–7.7)
PARTIAL THROMBOPLASTIN TIME: 29.9 SEC (ref 24–36)
PLATELET # BLD AUTO: 257 K/UL (ref 140–450)
PMV BLD AUTO: 7.8 FL (ref 6–12)
POTASSIUM SERPL-SCNC: 4.6 MMOL/L (ref 3.7–5.3)
PROT SERPL-MCNC: 6.8 G/DL (ref 6.6–8.7)
PROTHROMBIN TIME: 18 SEC (ref 11.8–14.6)
RBC # BLD AUTO: 4.82 M/UL (ref 4.5–5.9)
SODIUM SERPL-SCNC: 141 MMOL/L (ref 136–145)
TROPONIN I SERPL HS-MCNC: 57 NG/L (ref 0–22)
TROPONIN I SERPL HS-MCNC: 60 NG/L (ref 0–22)
WBC OTHER # BLD: 9.3 K/UL (ref 3.5–11)

## 2025-08-09 PROCEDURE — 93005 ELECTROCARDIOGRAM TRACING: CPT | Performed by: EMERGENCY MEDICINE

## 2025-08-09 PROCEDURE — 72125 CT NECK SPINE W/O DYE: CPT

## 2025-08-09 PROCEDURE — 70450 CT HEAD/BRAIN W/O DYE: CPT

## 2025-08-09 PROCEDURE — 85730 THROMBOPLASTIN TIME PARTIAL: CPT

## 2025-08-09 PROCEDURE — 80053 COMPREHEN METABOLIC PANEL: CPT

## 2025-08-09 PROCEDURE — 36415 COLL VENOUS BLD VENIPUNCTURE: CPT

## 2025-08-09 PROCEDURE — 99285 EMERGENCY DEPT VISIT HI MDM: CPT

## 2025-08-09 PROCEDURE — 71045 X-RAY EXAM CHEST 1 VIEW: CPT

## 2025-08-09 PROCEDURE — 83880 ASSAY OF NATRIURETIC PEPTIDE: CPT

## 2025-08-09 PROCEDURE — 85025 COMPLETE CBC W/AUTO DIFF WBC: CPT

## 2025-08-09 PROCEDURE — 6370000000 HC RX 637 (ALT 250 FOR IP): Performed by: STUDENT IN AN ORGANIZED HEALTH CARE EDUCATION/TRAINING PROGRAM

## 2025-08-09 PROCEDURE — 83605 ASSAY OF LACTIC ACID: CPT

## 2025-08-09 PROCEDURE — 99223 1ST HOSP IP/OBS HIGH 75: CPT | Performed by: STUDENT IN AN ORGANIZED HEALTH CARE EDUCATION/TRAINING PROGRAM

## 2025-08-09 PROCEDURE — 2500000003 HC RX 250 WO HCPCS: Performed by: STUDENT IN AN ORGANIZED HEALTH CARE EDUCATION/TRAINING PROGRAM

## 2025-08-09 PROCEDURE — 5A2204Z RESTORATION OF CARDIAC RHYTHM, SINGLE: ICD-10-PCS | Performed by: STUDENT IN AN ORGANIZED HEALTH CARE EDUCATION/TRAINING PROGRAM

## 2025-08-09 PROCEDURE — 85610 PROTHROMBIN TIME: CPT

## 2025-08-09 PROCEDURE — 1200000000 HC SEMI PRIVATE

## 2025-08-09 PROCEDURE — 92960 CARDIOVERSION ELECTRIC EXT: CPT

## 2025-08-09 PROCEDURE — 84484 ASSAY OF TROPONIN QUANT: CPT

## 2025-08-09 RX ORDER — METOPROLOL SUCCINATE 50 MG/1
50 TABLET, EXTENDED RELEASE ORAL DAILY
Status: DISCONTINUED | OUTPATIENT
Start: 2025-08-09 | End: 2025-08-11 | Stop reason: HOSPADM

## 2025-08-09 RX ORDER — FOLIC ACID 1 MG/1
1 TABLET ORAL DAILY
Status: DISCONTINUED | OUTPATIENT
Start: 2025-08-09 | End: 2025-08-11 | Stop reason: HOSPADM

## 2025-08-09 RX ORDER — POLYETHYLENE GLYCOL 3350 17 G/17G
17 POWDER, FOR SOLUTION ORAL DAILY PRN
Status: DISCONTINUED | OUTPATIENT
Start: 2025-08-09 | End: 2025-08-11 | Stop reason: HOSPADM

## 2025-08-09 RX ORDER — ACETAMINOPHEN 650 MG/1
650 SUPPOSITORY RECTAL EVERY 6 HOURS PRN
Status: DISCONTINUED | OUTPATIENT
Start: 2025-08-09 | End: 2025-08-11 | Stop reason: HOSPADM

## 2025-08-09 RX ORDER — MAGNESIUM SULFATE IN WATER 40 MG/ML
2000 INJECTION, SOLUTION INTRAVENOUS PRN
Status: DISCONTINUED | OUTPATIENT
Start: 2025-08-09 | End: 2025-08-11 | Stop reason: HOSPADM

## 2025-08-09 RX ORDER — SODIUM CHLORIDE 0.9 % (FLUSH) 0.9 %
5-40 SYRINGE (ML) INJECTION PRN
Status: DISCONTINUED | OUTPATIENT
Start: 2025-08-09 | End: 2025-08-11 | Stop reason: HOSPADM

## 2025-08-09 RX ORDER — CLOPIDOGREL BISULFATE 75 MG/1
75 TABLET ORAL DAILY
Status: DISCONTINUED | OUTPATIENT
Start: 2025-08-09 | End: 2025-08-11 | Stop reason: HOSPADM

## 2025-08-09 RX ORDER — POTASSIUM CHLORIDE 1500 MG/1
40 TABLET, EXTENDED RELEASE ORAL PRN
Status: DISCONTINUED | OUTPATIENT
Start: 2025-08-09 | End: 2025-08-11 | Stop reason: HOSPADM

## 2025-08-09 RX ORDER — ACETAMINOPHEN 325 MG/1
650 TABLET ORAL EVERY 6 HOURS PRN
Status: DISCONTINUED | OUTPATIENT
Start: 2025-08-09 | End: 2025-08-11 | Stop reason: HOSPADM

## 2025-08-09 RX ORDER — POTASSIUM CHLORIDE 7.45 MG/ML
10 INJECTION INTRAVENOUS PRN
Status: DISCONTINUED | OUTPATIENT
Start: 2025-08-09 | End: 2025-08-11 | Stop reason: HOSPADM

## 2025-08-09 RX ORDER — TAMSULOSIN HYDROCHLORIDE 0.4 MG/1
0.4 CAPSULE ORAL NIGHTLY
Status: DISCONTINUED | OUTPATIENT
Start: 2025-08-09 | End: 2025-08-11 | Stop reason: HOSPADM

## 2025-08-09 RX ORDER — ROSUVASTATIN CALCIUM 20 MG/1
20 TABLET, COATED ORAL DAILY
Status: DISCONTINUED | OUTPATIENT
Start: 2025-08-10 | End: 2025-08-11 | Stop reason: HOSPADM

## 2025-08-09 RX ORDER — FAMOTIDINE 20 MG/1
20 TABLET, FILM COATED ORAL 2 TIMES DAILY
Status: DISCONTINUED | OUTPATIENT
Start: 2025-08-09 | End: 2025-08-09

## 2025-08-09 RX ORDER — NITROGLYCERIN 0.4 MG/1
0.4 TABLET SUBLINGUAL EVERY 5 MIN PRN
Status: DISCONTINUED | OUTPATIENT
Start: 2025-08-09 | End: 2025-08-11 | Stop reason: HOSPADM

## 2025-08-09 RX ORDER — SODIUM CHLORIDE 9 MG/ML
INJECTION, SOLUTION INTRAVENOUS PRN
Status: DISCONTINUED | OUTPATIENT
Start: 2025-08-09 | End: 2025-08-11 | Stop reason: HOSPADM

## 2025-08-09 RX ORDER — SODIUM CHLORIDE 0.9 % (FLUSH) 0.9 %
5-40 SYRINGE (ML) INJECTION EVERY 12 HOURS SCHEDULED
Status: DISCONTINUED | OUTPATIENT
Start: 2025-08-09 | End: 2025-08-11 | Stop reason: HOSPADM

## 2025-08-09 RX ORDER — ONDANSETRON 4 MG/1
4 TABLET, ORALLY DISINTEGRATING ORAL EVERY 8 HOURS PRN
Status: DISCONTINUED | OUTPATIENT
Start: 2025-08-09 | End: 2025-08-11 | Stop reason: HOSPADM

## 2025-08-09 RX ORDER — MULTIVITAMIN WITH IRON
1000 TABLET ORAL DAILY
Status: DISCONTINUED | OUTPATIENT
Start: 2025-08-10 | End: 2025-08-11 | Stop reason: HOSPADM

## 2025-08-09 RX ORDER — FAMOTIDINE 20 MG/1
20 TABLET, FILM COATED ORAL DAILY
Status: DISCONTINUED | OUTPATIENT
Start: 2025-08-10 | End: 2025-08-11 | Stop reason: HOSPADM

## 2025-08-09 RX ORDER — ALBUTEROL SULFATE 90 UG/1
2 INHALANT RESPIRATORY (INHALATION) EVERY 6 HOURS PRN
Status: DISCONTINUED | OUTPATIENT
Start: 2025-08-09 | End: 2025-08-11 | Stop reason: HOSPADM

## 2025-08-09 RX ORDER — FERROUS SULFATE 325(65) MG
325 TABLET, DELAYED RELEASE (ENTERIC COATED) ORAL
Status: DISCONTINUED | OUTPATIENT
Start: 2025-08-10 | End: 2025-08-11 | Stop reason: HOSPADM

## 2025-08-09 RX ORDER — AMIODARONE HYDROCHLORIDE 200 MG/1
200 TABLET ORAL DAILY
Status: DISCONTINUED | OUTPATIENT
Start: 2025-08-09 | End: 2025-08-11 | Stop reason: HOSPADM

## 2025-08-09 RX ORDER — ONDANSETRON 2 MG/ML
4 INJECTION INTRAMUSCULAR; INTRAVENOUS EVERY 6 HOURS PRN
Status: DISCONTINUED | OUTPATIENT
Start: 2025-08-09 | End: 2025-08-11 | Stop reason: HOSPADM

## 2025-08-09 RX ADMIN — APIXABAN 5 MG: 5 TABLET, FILM COATED ORAL at 22:36

## 2025-08-09 RX ADMIN — FOLIC ACID 1 MG: 1 TABLET ORAL at 22:36

## 2025-08-09 RX ADMIN — SODIUM CHLORIDE, PRESERVATIVE FREE 10 ML: 5 INJECTION INTRAVENOUS at 23:16

## 2025-08-09 RX ADMIN — TAMSULOSIN HYDROCHLORIDE 0.4 MG: 0.4 CAPSULE ORAL at 22:36

## 2025-08-09 ASSESSMENT — LIFESTYLE VARIABLES
HOW OFTEN DO YOU HAVE A DRINK CONTAINING ALCOHOL: NEVER
HOW MANY STANDARD DRINKS CONTAINING ALCOHOL DO YOU HAVE ON A TYPICAL DAY: PATIENT DOES NOT DRINK

## 2025-08-09 ASSESSMENT — PAIN DESCRIPTION - LOCATION: LOCATION: CHEST

## 2025-08-09 ASSESSMENT — PAIN - FUNCTIONAL ASSESSMENT: PAIN_FUNCTIONAL_ASSESSMENT: 0-10

## 2025-08-09 ASSESSMENT — PAIN SCALES - GENERAL: PAINLEVEL_OUTOF10: 2

## 2025-08-10 PROBLEM — I63.9 CEREBROVASCULAR ACCIDENT (CVA) (HCC): Status: ACTIVE | Noted: 2025-08-10

## 2025-08-10 PROBLEM — R57.0 CARDIOGENIC SHOCK (HCC): Status: ACTIVE | Noted: 2025-08-10

## 2025-08-10 PROBLEM — I24.9 ACS (ACUTE CORONARY SYNDROME) (HCC): Status: ACTIVE | Noted: 2025-08-10

## 2025-08-10 PROBLEM — R79.89 ELEVATED TROPONIN: Status: ACTIVE | Noted: 2025-08-10

## 2025-08-10 PROBLEM — R00.0 WIDE-COMPLEX TACHYCARDIA: Status: ACTIVE | Noted: 2025-08-10

## 2025-08-10 LAB
CHOLEST SERPL-MCNC: 162 MG/DL (ref 0–199)
CHOLESTEROL/HDL RATIO: 3.4
EKG ATRIAL RATE: 156 BPM
EKG Q-T INTERVAL: 312 MS
EKG QRS DURATION: 128 MS
EKG QTC CALCULATION (BAZETT): 505 MS
EKG R AXIS: 10 DEGREES
EKG T AXIS: 146 DEGREES
EKG VENTRICULAR RATE: 158 BPM
EST. AVERAGE GLUCOSE BLD GHB EST-MCNC: 117 MG/DL
HBA1C MFR BLD: 5.7 % (ref 4–6)
HDLC SERPL-MCNC: 47 MG/DL
LDLC SERPL CALC-MCNC: 97 MG/DL (ref 0–100)
TRIGL SERPL-MCNC: 91 MG/DL
TROPONIN I SERPL HS-MCNC: 54 NG/L (ref 0–22)
TROPONIN I SERPL HS-MCNC: 70 NG/L (ref 0–22)
VLDLC SERPL CALC-MCNC: 18 MG/DL (ref 1–30)

## 2025-08-10 PROCEDURE — 99291 CRITICAL CARE FIRST HOUR: CPT | Performed by: INTERNAL MEDICINE

## 2025-08-10 PROCEDURE — 2700000000 HC OXYGEN THERAPY PER DAY

## 2025-08-10 PROCEDURE — 83036 HEMOGLOBIN GLYCOSYLATED A1C: CPT

## 2025-08-10 PROCEDURE — 80061 LIPID PANEL: CPT

## 2025-08-10 PROCEDURE — 2500000003 HC RX 250 WO HCPCS

## 2025-08-10 PROCEDURE — 99232 SBSQ HOSP IP/OBS MODERATE 35: CPT | Performed by: STUDENT IN AN ORGANIZED HEALTH CARE EDUCATION/TRAINING PROGRAM

## 2025-08-10 PROCEDURE — 2500000003 HC RX 250 WO HCPCS: Performed by: STUDENT IN AN ORGANIZED HEALTH CARE EDUCATION/TRAINING PROGRAM

## 2025-08-10 PROCEDURE — 99223 1ST HOSP IP/OBS HIGH 75: CPT | Performed by: INTERNAL MEDICINE

## 2025-08-10 PROCEDURE — 6360000002 HC RX W HCPCS

## 2025-08-10 PROCEDURE — 6370000000 HC RX 637 (ALT 250 FOR IP): Performed by: STUDENT IN AN ORGANIZED HEALTH CARE EDUCATION/TRAINING PROGRAM

## 2025-08-10 PROCEDURE — 93005 ELECTROCARDIOGRAM TRACING: CPT

## 2025-08-10 PROCEDURE — 99254 IP/OBS CNSLTJ NEW/EST MOD 60: CPT | Performed by: PSYCHIATRY & NEUROLOGY

## 2025-08-10 PROCEDURE — 93010 ELECTROCARDIOGRAM REPORT: CPT | Performed by: INTERNAL MEDICINE

## 2025-08-10 PROCEDURE — 3E033XZ INTRODUCTION OF VASOPRESSOR INTO PERIPHERAL VEIN, PERCUTANEOUS APPROACH: ICD-10-PCS | Performed by: STUDENT IN AN ORGANIZED HEALTH CARE EDUCATION/TRAINING PROGRAM

## 2025-08-10 PROCEDURE — 94761 N-INVAS EAR/PLS OXIMETRY MLT: CPT

## 2025-08-10 PROCEDURE — 84484 ASSAY OF TROPONIN QUANT: CPT

## 2025-08-10 PROCEDURE — 2000000000 HC ICU R&B

## 2025-08-10 PROCEDURE — 2580000003 HC RX 258

## 2025-08-10 PROCEDURE — 6360000002 HC RX W HCPCS: Performed by: STUDENT IN AN ORGANIZED HEALTH CARE EDUCATION/TRAINING PROGRAM

## 2025-08-10 PROCEDURE — 36415 COLL VENOUS BLD VENIPUNCTURE: CPT

## 2025-08-10 PROCEDURE — 99291 CRITICAL CARE FIRST HOUR: CPT

## 2025-08-10 RX ORDER — MIDODRINE HYDROCHLORIDE 5 MG/1
15 TABLET ORAL
Status: DISCONTINUED | OUTPATIENT
Start: 2025-08-10 | End: 2025-08-10

## 2025-08-10 RX ORDER — KETAMINE HYDROCHLORIDE 10 MG/ML
INJECTION, SOLUTION INTRAMUSCULAR; INTRAVENOUS
Status: COMPLETED
Start: 2025-08-10 | End: 2025-08-10

## 2025-08-10 RX ORDER — NOREPINEPHRINE BITARTRATE 0.03 MG/ML
1-100 INJECTION, SOLUTION INTRAVENOUS CONTINUOUS
Status: DISCONTINUED | OUTPATIENT
Start: 2025-08-10 | End: 2025-08-11 | Stop reason: HOSPADM

## 2025-08-10 RX ORDER — 0.9 % SODIUM CHLORIDE 0.9 %
1000 INTRAVENOUS SOLUTION INTRAVENOUS ONCE
Status: DISCONTINUED | OUTPATIENT
Start: 2025-08-10 | End: 2025-08-11 | Stop reason: HOSPADM

## 2025-08-10 RX ORDER — SODIUM CHLORIDE 0.9 % (FLUSH) 0.9 %
5-40 SYRINGE (ML) INJECTION PRN
Status: DISCONTINUED | OUTPATIENT
Start: 2025-08-10 | End: 2025-08-11 | Stop reason: HOSPADM

## 2025-08-10 RX ORDER — MIDODRINE HYDROCHLORIDE 5 MG/1
10 TABLET ORAL
Status: DISCONTINUED | OUTPATIENT
Start: 2025-08-10 | End: 2025-08-10

## 2025-08-10 RX ORDER — PHENYLEPHRINE HCL IN 0.9% NACL 50MG/250ML
10-300 PLASTIC BAG, INJECTION (ML) INTRAVENOUS CONTINUOUS
Status: DISCONTINUED | OUTPATIENT
Start: 2025-08-10 | End: 2025-08-10

## 2025-08-10 RX ORDER — SODIUM CHLORIDE 9 MG/ML
INJECTION, SOLUTION INTRAVENOUS PRN
Status: DISCONTINUED | OUTPATIENT
Start: 2025-08-10 | End: 2025-08-11 | Stop reason: HOSPADM

## 2025-08-10 RX ORDER — 0.9 % SODIUM CHLORIDE 0.9 %
500 INTRAVENOUS SOLUTION INTRAVENOUS ONCE
Status: DISCONTINUED | OUTPATIENT
Start: 2025-08-10 | End: 2025-08-11 | Stop reason: HOSPADM

## 2025-08-10 RX ORDER — SODIUM CHLORIDE 0.9 % (FLUSH) 0.9 %
5-40 SYRINGE (ML) INJECTION EVERY 12 HOURS SCHEDULED
Status: DISCONTINUED | OUTPATIENT
Start: 2025-08-10 | End: 2025-08-11 | Stop reason: HOSPADM

## 2025-08-10 RX ORDER — KETAMINE HYDROCHLORIDE 10 MG/ML
1 INJECTION, SOLUTION INTRAMUSCULAR; INTRAVENOUS ONCE
Status: COMPLETED | OUTPATIENT
Start: 2025-08-10 | End: 2025-08-10

## 2025-08-10 RX ORDER — PHENYLEPHRINE HYDROCHLORIDE 10 MG/ML
INJECTION INTRAVENOUS
Status: DISPENSED
Start: 2025-08-10 | End: 2025-08-10

## 2025-08-10 RX ORDER — HYDROCORTISONE SODIUM SUCCINATE 100 MG/2ML
100 INJECTION INTRAMUSCULAR; INTRAVENOUS EVERY 8 HOURS
Status: DISCONTINUED | OUTPATIENT
Start: 2025-08-10 | End: 2025-08-11 | Stop reason: HOSPADM

## 2025-08-10 RX ORDER — LIDOCAINE HYDROCHLORIDE 20 MG/ML
100 INJECTION, SOLUTION INFILTRATION; PERINEURAL ONCE
Status: DISCONTINUED | OUTPATIENT
Start: 2025-08-10 | End: 2025-08-11 | Stop reason: HOSPADM

## 2025-08-10 RX ORDER — MIDODRINE HYDROCHLORIDE 5 MG/1
15 TABLET ORAL
Status: DISCONTINUED | OUTPATIENT
Start: 2025-08-10 | End: 2025-08-11 | Stop reason: HOSPADM

## 2025-08-10 RX ADMIN — FAMOTIDINE 20 MG: 20 TABLET, FILM COATED ORAL at 10:56

## 2025-08-10 RX ADMIN — KETAMINE HYDROCHLORIDE 70 MG: 10 INJECTION INTRAMUSCULAR; INTRAVENOUS at 06:22

## 2025-08-10 RX ADMIN — APIXABAN 2.5 MG: 2.5 TABLET, FILM COATED ORAL at 22:07

## 2025-08-10 RX ADMIN — MIDODRINE HYDROCHLORIDE 10 MG: 5 TABLET ORAL at 10:56

## 2025-08-10 RX ADMIN — PHENYLEPHRINE HYDROCHLORIDE 100 MCG/MIN: 10 INJECTION INTRAVENOUS at 11:03

## 2025-08-10 RX ADMIN — HYDROCORTISONE SODIUM SUCCINATE 100 MG: 100 INJECTION, POWDER, FOR SOLUTION INTRAMUSCULAR; INTRAVENOUS at 10:57

## 2025-08-10 RX ADMIN — Medication 1000 ML: at 06:00

## 2025-08-10 RX ADMIN — AMIODARONE HYDROCHLORIDE 150 MG: 1.5 INJECTION, SOLUTION INTRAVENOUS at 06:08

## 2025-08-10 RX ADMIN — Medication 1000 ML: at 06:40

## 2025-08-10 RX ADMIN — NOREPINEPHRINE BITARTRATE 2 MCG/MIN: 32 SOLUTION INTRAVENOUS at 06:08

## 2025-08-10 RX ADMIN — HYDROCORTISONE SODIUM SUCCINATE 100 MG: 100 INJECTION, POWDER, FOR SOLUTION INTRAMUSCULAR; INTRAVENOUS at 15:37

## 2025-08-10 RX ADMIN — Medication 100 MCG/MIN: at 06:55

## 2025-08-10 RX ADMIN — CLOPIDOGREL BISULFATE 75 MG: 75 TABLET, FILM COATED ORAL at 10:56

## 2025-08-10 RX ADMIN — AMIODARONE HYDROCHLORIDE 150 MG: 1.5 INJECTION, SOLUTION INTRAVENOUS at 06:18

## 2025-08-10 RX ADMIN — KETAMINE HYDROCHLORIDE 70 MG: 10 INJECTION, SOLUTION INTRAMUSCULAR; INTRAVENOUS at 06:22

## 2025-08-10 RX ADMIN — SODIUM CHLORIDE, PRESERVATIVE FREE 10 ML: 5 INJECTION INTRAVENOUS at 22:32

## 2025-08-10 RX ADMIN — CYANOCOBALAMIN TAB 500 MCG 1000 MCG: 500 TAB at 10:56

## 2025-08-10 RX ADMIN — MIDODRINE HYDROCHLORIDE 15 MG: 5 TABLET ORAL at 15:37

## 2025-08-10 RX ADMIN — TAMSULOSIN HYDROCHLORIDE 0.4 MG: 0.4 CAPSULE ORAL at 22:07

## 2025-08-10 RX ADMIN — FOLIC ACID 1 MG: 1 TABLET ORAL at 10:56

## 2025-08-10 RX ADMIN — FERROUS SULFATE TAB EC 325 MG (65 MG FE EQUIVALENT) 325 MG: 325 (65 FE) TABLET DELAYED RESPONSE at 10:56

## 2025-08-10 RX ADMIN — PHENYLEPHRINE HYDROCHLORIDE 75 MCG/MIN: 10 INJECTION INTRAVENOUS at 16:21

## 2025-08-10 RX ADMIN — APIXABAN 5 MG: 5 TABLET, FILM COATED ORAL at 10:56

## 2025-08-10 RX ADMIN — HYDROCORTISONE SODIUM SUCCINATE 100 MG: 100 INJECTION, POWDER, FOR SOLUTION INTRAMUSCULAR; INTRAVENOUS at 22:06

## 2025-08-10 ASSESSMENT — PAIN - FUNCTIONAL ASSESSMENT: PAIN_FUNCTIONAL_ASSESSMENT: CRITICAL CARE PAIN OBSERVATION TOOL (CPOT)

## 2025-08-10 ASSESSMENT — PAIN SCALES - GENERAL
PAINLEVEL_OUTOF10: 0
PAINLEVEL_OUTOF10: 0

## 2025-08-11 ENCOUNTER — APPOINTMENT (OUTPATIENT)
Age: 80
DRG: 280 | End: 2025-08-11
Attending: STUDENT IN AN ORGANIZED HEALTH CARE EDUCATION/TRAINING PROGRAM
Payer: OTHER GOVERNMENT

## 2025-08-11 VITALS
DIASTOLIC BLOOD PRESSURE: 85 MMHG | OXYGEN SATURATION: 96 % | HEART RATE: 65 BPM | SYSTOLIC BLOOD PRESSURE: 104 MMHG | TEMPERATURE: 98.8 F | RESPIRATION RATE: 14 BRPM | HEIGHT: 67 IN | WEIGHT: 156 LBS | BODY MASS INDEX: 24.48 KG/M2

## 2025-08-11 LAB
ANION GAP SERPL CALCULATED.3IONS-SCNC: 13 MMOL/L (ref 9–16)
BASOPHILS # BLD: 0 K/UL (ref 0–0.2)
BASOPHILS NFR BLD: 0 % (ref 0–2)
BUN SERPL-MCNC: 28 MG/DL (ref 8–23)
CALCIUM SERPL-MCNC: 8.9 MG/DL (ref 8.6–10.4)
CHLORIDE SERPL-SCNC: 107 MMOL/L (ref 98–107)
CO2 SERPL-SCNC: 20 MMOL/L (ref 20–31)
CREAT SERPL-MCNC: 1.6 MG/DL (ref 0.7–1.2)
ECHO AO ROOT DIAM: 3.7 CM
ECHO AO ROOT INDEX: 2.03 CM/M2
ECHO AV AREA PEAK VELOCITY: 1.8 CM2
ECHO AV AREA VTI: 1.8 CM2
ECHO AV AREA/BSA PEAK VELOCITY: 1 CM2/M2
ECHO AV AREA/BSA VTI: 1 CM2/M2
ECHO AV MEAN GRADIENT: 5 MMHG
ECHO AV MEAN VELOCITY: 1 M/S
ECHO AV PEAK GRADIENT: 9 MMHG
ECHO AV PEAK VELOCITY: 1.5 M/S
ECHO AV VELOCITY RATIO: 0.53
ECHO AV VTI: 31.4 CM
ECHO BSA: 1.83 M2
ECHO EST RA PRESSURE: 3 MMHG
ECHO LA AREA 2C: 16.9 CM2
ECHO LA AREA 4C: 19 CM2
ECHO LA DIAMETER INDEX: 2.75 CM/M2
ECHO LA DIAMETER: 5 CM
ECHO LA MAJOR AXIS: 5.4 CM
ECHO LA MINOR AXIS: 5 CM
ECHO LA TO AORTIC ROOT RATIO: 1.35
ECHO LA VOL BP: 48 ML (ref 18–58)
ECHO LA VOL MOD A2C: 46 ML (ref 18–58)
ECHO LA VOL MOD A4C: 47 ML (ref 18–58)
ECHO LA VOL/BSA BIPLANE: 26 ML/M2 (ref 16–34)
ECHO LA VOLUME INDEX MOD A2C: 25 ML/M2 (ref 16–34)
ECHO LA VOLUME INDEX MOD A4C: 26 ML/M2 (ref 16–34)
ECHO LV E' LATERAL VELOCITY: 6.53 CM/S
ECHO LV E' SEPTAL VELOCITY: 7.4 CM/S
ECHO LV EDV A2C: 139 ML
ECHO LV EDV A4C: 126 ML
ECHO LV EDV INDEX A4C: 69 ML/M2
ECHO LV EDV NDEX A2C: 76 ML/M2
ECHO LV EF PHYSICIAN: 35 %
ECHO LV EJECTION FRACTION A2C: 41 %
ECHO LV EJECTION FRACTION A4C: 41 %
ECHO LV EJECTION FRACTION BIPLANE: 40 % (ref 55–100)
ECHO LV ESV A2C: 82 ML
ECHO LV ESV A4C: 75 ML
ECHO LV ESV INDEX A2C: 45 ML/M2
ECHO LV ESV INDEX A4C: 41 ML/M2
ECHO LV FRACTIONAL SHORTENING: 6 % (ref 28–44)
ECHO LV INTERNAL DIMENSION DIASTOLE INDEX: 2.97 CM/M2
ECHO LV INTERNAL DIMENSION DIASTOLIC: 5.4 CM (ref 4.2–5.9)
ECHO LV INTERNAL DIMENSION SYSTOLIC INDEX: 2.8 CM/M2
ECHO LV INTERNAL DIMENSION SYSTOLIC: 5.1 CM
ECHO LV IVSD: 0.9 CM (ref 0.6–1)
ECHO LV MASS 2D: 180.1 G (ref 88–224)
ECHO LV MASS INDEX 2D: 99 G/M2 (ref 49–115)
ECHO LV POSTERIOR WALL DIASTOLIC: 0.9 CM (ref 0.6–1)
ECHO LV RELATIVE WALL THICKNESS RATIO: 0.33
ECHO LVOT AREA: 3.5 CM2
ECHO LVOT AV VTI INDEX: 0.51
ECHO LVOT DIAM: 2.1 CM
ECHO LVOT MEAN GRADIENT: 1 MMHG
ECHO LVOT PEAK GRADIENT: 2 MMHG
ECHO LVOT PEAK VELOCITY: 0.8 M/S
ECHO LVOT STROKE VOLUME INDEX: 30.4 ML/M2
ECHO LVOT SV: 55.4 ML
ECHO LVOT VTI: 16 CM
ECHO MV A VELOCITY: 1.09 M/S
ECHO MV AREA VTI: 1.5 CM2
ECHO MV E DECELERATION TIME (DT): 195 MS
ECHO MV E VELOCITY: 0.74 M/S
ECHO MV E/A RATIO: 0.68
ECHO MV E/E' LATERAL: 11.33
ECHO MV E/E' RATIO (AVERAGED): 10.67
ECHO MV E/E' SEPTAL: 10
ECHO MV LVOT VTI INDEX: 2.34
ECHO MV MAX VELOCITY: 1 M/S
ECHO MV MEAN GRADIENT: 2 MMHG
ECHO MV MEAN VELOCITY: 0.6 M/S
ECHO MV PEAK GRADIENT: 4 MMHG
ECHO MV VTI: 37.4 CM
ECHO PV MAX VELOCITY: 0.9 M/S
ECHO PV PEAK GRADIENT: 3 MMHG
ECHO RA AREA 4C: 15.8 CM2
ECHO RA END SYSTOLIC VOLUME APICAL 4 CHAMBER INDEX BSA: 24 ML/M2
ECHO RA VOLUME: 44 ML
ECHO RIGHT VENTRICULAR SYSTOLIC PRESSURE (RVSP): 27 MMHG
ECHO RV BASAL DIMENSION: 4.6 CM
ECHO RV FREE WALL PEAK S': 9.5 CM/S
ECHO RV TAPSE: 1.4 CM (ref 1.7–?)
ECHO TV REGURGITANT MAX VELOCITY: 2.47 M/S
ECHO TV REGURGITANT PEAK GRADIENT: 24 MMHG
EKG ATRIAL RATE: 61 BPM
EKG ATRIAL RATE: 69 BPM
EKG P AXIS: 62 DEGREES
EKG P-R INTERVAL: 140 MS
EKG P-R INTERVAL: 170 MS
EKG Q-T INTERVAL: 498 MS
EKG Q-T INTERVAL: 512 MS
EKG QRS DURATION: 162 MS
EKG QRS DURATION: 186 MS
EKG QTC CALCULATION (BAZETT): 501 MS
EKG QTC CALCULATION (BAZETT): 548 MS
EKG R AXIS: 252 DEGREES
EKG R AXIS: 254 DEGREES
EKG T AXIS: 61 DEGREES
EKG T AXIS: 69 DEGREES
EKG VENTRICULAR RATE: 61 BPM
EKG VENTRICULAR RATE: 69 BPM
EOSINOPHIL # BLD: 0 K/UL (ref 0–0.4)
EOSINOPHILS RELATIVE PERCENT: 0 % (ref 1–4)
ERYTHROCYTE [DISTWIDTH] IN BLOOD BY AUTOMATED COUNT: 14.7 % (ref 12.5–15.4)
GFR, ESTIMATED: 43 ML/MIN/1.73M2
GLUCOSE SERPL-MCNC: 132 MG/DL (ref 74–99)
HCT VFR BLD AUTO: 38.8 % (ref 41–53)
HGB BLD-MCNC: 12.5 G/DL (ref 13.5–17.5)
LYMPHOCYTES NFR BLD: 0.5 K/UL (ref 1–4.8)
LYMPHOCYTES RELATIVE PERCENT: 7 % (ref 24–44)
MCH RBC QN AUTO: 29.4 PG (ref 26–34)
MCHC RBC AUTO-ENTMCNC: 32.3 G/DL (ref 31–37)
MCV RBC AUTO: 91.2 FL (ref 80–100)
MONOCYTES NFR BLD: 0.5 K/UL (ref 0.1–1.2)
MONOCYTES NFR BLD: 7 % (ref 2–11)
NEUTROPHILS NFR BLD: 86 % (ref 36–66)
NEUTS SEG NFR BLD: 5.6 K/UL (ref 1.8–7.7)
PLATELET # BLD AUTO: 273 K/UL (ref 140–450)
PMV BLD AUTO: 8.1 FL (ref 6–12)
POTASSIUM SERPL-SCNC: 4 MMOL/L (ref 3.7–5.3)
RBC # BLD AUTO: 4.25 M/UL (ref 4.5–5.9)
SODIUM SERPL-SCNC: 140 MMOL/L (ref 136–145)
WBC OTHER # BLD: 6.6 K/UL (ref 3.5–11)

## 2025-08-11 PROCEDURE — 99233 SBSQ HOSP IP/OBS HIGH 50: CPT | Performed by: NURSE PRACTITIONER

## 2025-08-11 PROCEDURE — 93306 TTE W/DOPPLER COMPLETE: CPT | Performed by: INTERNAL MEDICINE

## 2025-08-11 PROCEDURE — C1751 CATH, INF, PER/CENT/MIDLINE: HCPCS

## 2025-08-11 PROCEDURE — 93010 ELECTROCARDIOGRAM REPORT: CPT | Performed by: STUDENT IN AN ORGANIZED HEALTH CARE EDUCATION/TRAINING PROGRAM

## 2025-08-11 PROCEDURE — 85025 COMPLETE CBC W/AUTO DIFF WBC: CPT

## 2025-08-11 PROCEDURE — 6360000002 HC RX W HCPCS: Performed by: STUDENT IN AN ORGANIZED HEALTH CARE EDUCATION/TRAINING PROGRAM

## 2025-08-11 PROCEDURE — 6360000002 HC RX W HCPCS

## 2025-08-11 PROCEDURE — 6370000000 HC RX 637 (ALT 250 FOR IP): Performed by: STUDENT IN AN ORGANIZED HEALTH CARE EDUCATION/TRAINING PROGRAM

## 2025-08-11 PROCEDURE — 93306 TTE W/DOPPLER COMPLETE: CPT

## 2025-08-11 PROCEDURE — 02HV33Z INSERTION OF INFUSION DEVICE INTO SUPERIOR VENA CAVA, PERCUTANEOUS APPROACH: ICD-10-PCS | Performed by: INTERNAL MEDICINE

## 2025-08-11 PROCEDURE — 2580000003 HC RX 258

## 2025-08-11 PROCEDURE — 94761 N-INVAS EAR/PLS OXIMETRY MLT: CPT

## 2025-08-11 PROCEDURE — 2700000000 HC OXYGEN THERAPY PER DAY

## 2025-08-11 PROCEDURE — 76937 US GUIDE VASCULAR ACCESS: CPT

## 2025-08-11 PROCEDURE — 80048 BASIC METABOLIC PNL TOTAL CA: CPT

## 2025-08-11 PROCEDURE — 36569 INSJ PICC 5 YR+ W/O IMAGING: CPT

## 2025-08-11 PROCEDURE — 2500000003 HC RX 250 WO HCPCS

## 2025-08-11 PROCEDURE — 36415 COLL VENOUS BLD VENIPUNCTURE: CPT

## 2025-08-11 RX ORDER — SODIUM CHLORIDE 0.9 % (FLUSH) 0.9 %
5-40 SYRINGE (ML) INJECTION PRN
Status: DISCONTINUED | OUTPATIENT
Start: 2025-08-11 | End: 2025-08-11 | Stop reason: HOSPADM

## 2025-08-11 RX ORDER — SODIUM CHLORIDE 0.9 % (FLUSH) 0.9 %
5-40 SYRINGE (ML) INJECTION EVERY 12 HOURS SCHEDULED
Status: DISCONTINUED | OUTPATIENT
Start: 2025-08-11 | End: 2025-08-11 | Stop reason: HOSPADM

## 2025-08-11 RX ORDER — LIDOCAINE HYDROCHLORIDE 10 MG/ML
50 INJECTION, SOLUTION EPIDURAL; INFILTRATION; INTRACAUDAL; PERINEURAL ONCE
Status: DISCONTINUED | OUTPATIENT
Start: 2025-08-11 | End: 2025-08-11 | Stop reason: HOSPADM

## 2025-08-11 RX ORDER — SODIUM CHLORIDE 9 MG/ML
INJECTION, SOLUTION INTRAVENOUS PRN
Status: DISCONTINUED | OUTPATIENT
Start: 2025-08-11 | End: 2025-08-11 | Stop reason: HOSPADM

## 2025-08-11 RX ADMIN — FERROUS SULFATE TAB EC 325 MG (65 MG FE EQUIVALENT) 325 MG: 325 (65 FE) TABLET DELAYED RESPONSE at 08:40

## 2025-08-11 RX ADMIN — PHENYLEPHRINE HYDROCHLORIDE 85 MCG/MIN: 50 INJECTION INTRAVENOUS at 07:06

## 2025-08-11 RX ADMIN — FAMOTIDINE 20 MG: 20 TABLET, FILM COATED ORAL at 08:40

## 2025-08-11 RX ADMIN — FOLIC ACID 1 MG: 1 TABLET ORAL at 08:40

## 2025-08-11 RX ADMIN — MIDODRINE HYDROCHLORIDE 15 MG: 5 TABLET ORAL at 13:45

## 2025-08-11 RX ADMIN — PHENYLEPHRINE HYDROCHLORIDE 75 MCG/MIN: 50 INJECTION INTRAVENOUS at 17:56

## 2025-08-11 RX ADMIN — MIDODRINE HYDROCHLORIDE 15 MG: 5 TABLET ORAL at 17:41

## 2025-08-11 RX ADMIN — APIXABAN 2.5 MG: 2.5 TABLET, FILM COATED ORAL at 08:40

## 2025-08-11 RX ADMIN — CLOPIDOGREL BISULFATE 75 MG: 75 TABLET, FILM COATED ORAL at 08:40

## 2025-08-11 RX ADMIN — HYDROCORTISONE SODIUM SUCCINATE 100 MG: 100 INJECTION, POWDER, FOR SOLUTION INTRAMUSCULAR; INTRAVENOUS at 08:40

## 2025-08-11 RX ADMIN — AMIODARONE HYDROCHLORIDE 200 MG: 200 TABLET ORAL at 08:40

## 2025-08-11 RX ADMIN — HYDROCORTISONE SODIUM SUCCINATE 100 MG: 100 INJECTION, POWDER, FOR SOLUTION INTRAMUSCULAR; INTRAVENOUS at 17:41

## 2025-08-11 RX ADMIN — SODIUM CHLORIDE, PRESERVATIVE FREE 10 ML: 5 INJECTION INTRAVENOUS at 08:40

## 2025-08-11 RX ADMIN — MIDODRINE HYDROCHLORIDE 15 MG: 5 TABLET ORAL at 08:40

## 2025-08-11 RX ADMIN — ROSUVASTATIN 20 MG: 20 TABLET, FILM COATED ORAL at 05:54

## 2025-08-11 RX ADMIN — CYANOCOBALAMIN TAB 500 MCG 1000 MCG: 500 TAB at 08:40

## 2025-08-11 ASSESSMENT — ENCOUNTER SYMPTOMS
VOMITING: 0
ABDOMINAL PAIN: 0
NAUSEA: 0
VOICE CHANGE: 0
SHORTNESS OF BREATH: 1
BACK PAIN: 0

## 2025-08-11 ASSESSMENT — PAIN SCALES - GENERAL: PAINLEVEL_OUTOF10: 0

## (undated) DEVICE — Z DUP USE 2522782 SOLUTION IRRIG 1000ML STRL H2O PLAS CONTAINER UROMATIC

## (undated) DEVICE — Device

## (undated) DEVICE — GLOVE SURG SZ 7 CRM LTX FREE POLYISOPRENE POLYMER BEAD ANTI